# Patient Record
Sex: FEMALE | Race: WHITE | Employment: FULL TIME | URBAN - METROPOLITAN AREA
[De-identification: names, ages, dates, MRNs, and addresses within clinical notes are randomized per-mention and may not be internally consistent; named-entity substitution may affect disease eponyms.]

---

## 2017-01-20 ENCOUNTER — TELEPHONE (OUTPATIENT)
Dept: INTERNAL MEDICINE CLINIC | Age: 62
End: 2017-01-20

## 2017-01-20 DIAGNOSIS — F98.8 ADD (ATTENTION DEFICIT DISORDER): ICD-10-CM

## 2017-01-20 DIAGNOSIS — F41.9 ANXIETY AND DEPRESSION: Primary | ICD-10-CM

## 2017-01-20 DIAGNOSIS — F32.A ANXIETY AND DEPRESSION: Primary | ICD-10-CM

## 2017-01-20 DIAGNOSIS — E11.9 TYPE 2 DIABETES MELLITUS WITHOUT COMPLICATION, WITHOUT LONG-TERM CURRENT USE OF INSULIN (HCC): ICD-10-CM

## 2017-01-20 RX ORDER — ESCITALOPRAM OXALATE 20 MG/1
40 TABLET ORAL DAILY
Qty: 60 TAB | Refills: 3 | Status: SHIPPED | OUTPATIENT
Start: 2017-01-20 | End: 2017-10-16 | Stop reason: SDUPTHER

## 2017-01-20 RX ORDER — DEXTROAMPHETAMINE SACCHARATE, AMPHETAMINE ASPARTATE, DEXTROAMPHETAMINE SULFATE AND AMPHETAMINE SULFATE 2.5; 2.5; 2.5; 2.5 MG/1; MG/1; MG/1; MG/1
30 TABLET ORAL DAILY
Qty: 90 TAB | Refills: 0 | Status: SHIPPED | OUTPATIENT
Start: 2017-01-20 | End: 2017-02-03 | Stop reason: SDUPTHER

## 2017-01-20 RX ORDER — METFORMIN HYDROCHLORIDE 500 MG/1
500 TABLET, EXTENDED RELEASE ORAL
Qty: 30 TAB | Refills: 3 | Status: SHIPPED | OUTPATIENT
Start: 2017-01-20 | End: 2018-04-21

## 2017-01-20 NOTE — TELEPHONE ENCOUNTER
[address] [facility] called needing to verify the dose for the Lexapro 20 mg prescription. Please advise.    # [phone]

## 2017-01-23 ENCOUNTER — TELEPHONE (OUTPATIENT)
Dept: INTERNAL MEDICINE CLINIC | Age: 62
End: 2017-01-23

## 2017-01-24 RX ORDER — HYDROCHLOROTHIAZIDE 25 MG/1
25 TABLET ORAL DAILY
Qty: 30 TAB | Refills: 0 | Status: SHIPPED | OUTPATIENT
Start: 2017-01-24 | End: 2017-10-16 | Stop reason: SDUPTHER

## 2017-02-03 ENCOUNTER — OFFICE VISIT (OUTPATIENT)
Dept: INTERNAL MEDICINE CLINIC | Age: 62
End: 2017-02-03

## 2017-02-03 VITALS
BODY MASS INDEX: 34.89 KG/M2 | WEIGHT: 189.6 LBS | TEMPERATURE: 98.3 F | RESPIRATION RATE: 18 BRPM | HEIGHT: 62 IN | DIASTOLIC BLOOD PRESSURE: 70 MMHG | SYSTOLIC BLOOD PRESSURE: 130 MMHG | HEART RATE: 100 BPM | OXYGEN SATURATION: 97 %

## 2017-02-03 DIAGNOSIS — F41.8 DEPRESSION WITH ANXIETY: ICD-10-CM

## 2017-02-03 DIAGNOSIS — Z01.818 PRE-OPERATIVE CLEARANCE: ICD-10-CM

## 2017-02-03 DIAGNOSIS — M79.7 FIBROMYALGIA: ICD-10-CM

## 2017-02-03 DIAGNOSIS — Z72.0 TOBACCO ABUSE: ICD-10-CM

## 2017-02-03 DIAGNOSIS — E11.9 TYPE 2 DIABETES MELLITUS WITHOUT COMPLICATION, WITHOUT LONG-TERM CURRENT USE OF INSULIN (HCC): ICD-10-CM

## 2017-02-03 DIAGNOSIS — I10 ESSENTIAL HYPERTENSION: ICD-10-CM

## 2017-02-03 DIAGNOSIS — F98.8 ADD (ATTENTION DEFICIT DISORDER): ICD-10-CM

## 2017-02-03 DIAGNOSIS — M17.0 PRIMARY OSTEOARTHRITIS OF BOTH KNEES: Primary | ICD-10-CM

## 2017-02-03 RX ORDER — DEXTROAMPHETAMINE SACCHARATE, AMPHETAMINE ASPARTATE, DEXTROAMPHETAMINE SULFATE AND AMPHETAMINE SULFATE 2.5; 2.5; 2.5; 2.5 MG/1; MG/1; MG/1; MG/1
30 TABLET ORAL DAILY
Qty: 90 TAB | Refills: 0 | Status: SHIPPED | OUTPATIENT
Start: 2017-02-20 | End: 2017-07-10 | Stop reason: SDUPTHER

## 2017-02-03 RX ORDER — PREGABALIN 200 MG/1
200 CAPSULE ORAL 3 TIMES DAILY
COMMUNITY
Start: 2017-02-03 | End: 2018-04-21

## 2017-02-03 NOTE — PATIENT INSTRUCTIONS
Learning About Living [place]  What is a living will? A living will is a legal form you use to write down the kind of care you want at the end of your life. It is used by the health professionals who will treat you if you aren't able to decide for yourself. If you put your wishes in writing, your loved ones and others will know what kind of care you want. They won't need to guess. This can ease your mind and be helpful to others. A living will is not the same as an estate or property will. An estate will explains what you want to happen with your money and property after you die. Is a living will a legal document? A living will is a legal document. Each state has its own laws about living kaur. If you move to another state, make sure that your living will is legal in the state where you now live. Or you might use a universal form that has been approved by many states. This kind of form can sometimes be completed and stored online. Your electronic copy will then be available wherever you have a connection to the Internet. In most cases, doctors will respect your wishes even if you have a form from a different state. · You don't need an  to complete a living will. But legal advice can be helpful if your state's laws are unclear, your health history is complicated, or your family can't agree on what should be in your living will. · You can change your living will at any time. Some people find that their wishes about end-of-life care change as their health changes. · In addition to making a living will, think about completing a medical power of  form. This form lets you name the person you want to make end-of-life treatment decisions for you (your \"health care agent\") if you're not able to. Many hospitals and nursing homes will give you the forms you need to complete a living will and a medical power of .   · Your living will is used only if you can't make or communicate decisions for yourself anymore. If you become able to make decisions again, you can accept or refuse any treatment, no matter what you wrote in your living will. · Your state may offer an online registry. This is a place where you can store your living will online so the doctors and nurses who need to treat you can find it right away. What should you think about when creating a living will? Talk about your end-of-life wishes with your family members and your doctor. Let them know what you want. That way the people making decisions for you won't be surprised by your choices. Think about these questions as you make your living will:  · Do you know enough about life support methods that might be used? If not, talk to your doctor so you know what might be done if you can't breathe on your own, your heart stops, or you're unable to swallow. · What things would you still want to be able to do after you receive life-support methods? Would you want to be able to walk? To speak? To eat on your own? To live without the help of machines? · If you have a choice, where do you want to be cared for? In your home? At a hospital or nursing home? · Do you want certain Taoism practices performed if you become very ill? · If you have a choice at the end of your life, where would you prefer to die? At home? In a hospital or nursing home? Somewhere else? · Would you prefer to be buried or cremated? · Do you want your organs to be donated after you die? What should you do with your living will? · Make sure that your family members and your health care agent have copies of your living will. · Give your doctor a copy of your living will to keep in your medical record. If you have more than one doctor, make sure that each one has a copy. · You may want to put a copy of your living will where it can be easily found. Where can you learn more? Go to http://ac-vianey.info/.   Enter B343 in the search box to learn more about \"Learning About Living [place]. \"  Current as of: February 24, 2016  Content Version: 11.1  © 3266-3697 [employer]. Care instructions adapted under license by [employer] (which disclaims liability or warranty for this information). If you have questions about a medical condition or this instruction, always ask your healthcare professional. [address] any warranty or liability for your use of this information.

## 2017-02-03 NOTE — MR AVS SNAPSHOT
Visit Information Date & Time Provider Department Dept. Phone Encounter #  
 2/3/2017  3:00 PM [facility] [id number] [facility] [phone] [id number] Follow-up Instructions Return in about 3 months (around 5/3/2017) for diabetes, htn. Upcoming Health Maintenance Date Due Hepatitis C Screening 1955 DTaP/Tdap/Td series (1 - Tdap) 11/20/1974 ZOSTER VACCINE AGE 60> 11/20/2013 EYE EXAM RETINAL OR DILATED Q1 3/19/2016 FOBT Q 1 YEAR AGE 50-75 3/19/2016 INFLUENZA AGE 9 TO ADULT 8/1/2016 HEMOGLOBIN A1C Q6M 1/20/2017 LIPID PANEL Q1 3/1/2017 FOOT EXAM Q1 3/2/2017 Pneumococcal 19-64 Highest Risk (2 of 3 - PCV13) 2/3/2018 MICROALBUMIN Q1 2/3/2018 BREAST CANCER SCRN MAMMOGRAM 2/3/2019 PAP AKA CERVICAL CYTOLOGY 2/3/2020 Allergies as of 2/3/2017  Review Complete On: 2/3/2017 By: Sha Swanson NP Severity Noted Reaction Type Reactions Codeine  12/28/2010    Other (comments) Severe headaches Erythromycin  02/03/2017    Rash Morphine  12/28/2010    Hives Neomycin  02/03/2017    Rash Current Immunizations  Never Reviewed No immunizations on file. Not reviewed this visit You Were Diagnosed With   
  
 Codes Comments Primary osteoarthritis of both knees    -  Primary ICD-10-CM: M17.0 ICD-9-CM: 715.16 Pre-operative clearance     ICD-10-CM: I95.267 ICD-9-CM: V72.84 Essential hypertension     ICD-10-CM: I10 
ICD-9-CM: 401.9 Depression with anxiety     ICD-10-CM: F41.8 ICD-9-CM: 300.4 Fibromyalgia     ICD-10-CM: M79.7 ICD-9-CM: 729.1 Type 2 diabetes mellitus without complication, without long-term current use of insulin (HCC)     ICD-10-CM: E11.9 ICD-9-CM: 250.00 Tobacco abuse     ICD-10-CM: Z72.0 ICD-9-CM: 305.1 ADD (attention deficit disorder)     ICD-10-CM: F98.8 ICD-9-CM: 314.00 Vitals  BP Pulse Temp Resp Height(growth percentile) Weight(growth percentile) 130/70 100 98.3 °F (36.8 °C) (Oral) 18 5' 2.01\" (1.575 m) 189 lb 9.6 oz (86 kg) SpO2 BMI OB Status Smoking Status 97% 34.67 kg/m2 Postmenopausal Current Some Day Smoker Vitals History BMI and BSA Data Body Mass Index Body Surface Area  
 34.67 kg/m 2 1.94 m 2 Preferred Pharmacy Pharmacy Name Phone [facility] [address] [phone] Your Updated Medication List  
  
   
This list is accurate as of: 2/3/17  3:50 PM.  Always use your most recent med list.  
  
  
  
  
 albuterol 90 mcg/actuation inhaler Commonly known as:  PROVENTIL HFA, VENTOLIN HFA, PROAIR HFA Take 2 Puffs by inhalation every four (4) hours as needed for Wheezing. dextroamphetamine-amphetamine 10 mg tablet Commonly known as:  ADDERALL Take 3 Tabs (30 mg total) by mouth dailyEarliest Fill Date: 2/20/17. Start taking on:  2/20/2017  
  
 escitalopram oxalate 20 mg tablet Commonly known as:  Hiren Fine Take 2 Tabs by mouth daily. fluticasone-salmeterol 250-50 mcg/dose diskus inhaler Commonly known as:  ADVAIR Take 1 Puff by inhalation two (2) times a day. hydroCHLOROthiazide 25 mg tablet Commonly known as:  HYDRODIURIL Take 1 Tab by mouth daily. Appointment required  
  
 lisinopril 40 mg tablet Commonly known as:  PRINIVIL, ZESTRIL  
TAKE ONE TABLET BY MOUTH EVERY DAY  
  
 metFORMIN  mg tablet Commonly known as:  GLUCOPHAGE XR Take 1 Tab by mouth daily (with dinner). Indications: type 2 diabetes mellitus  
  
 mometasone 50 mcg/actuation nasal spray Commonly known as:  NASONEX  
USE TWO SPRAY(S) IN EACH NOSTRIL ONCE DAILY  
  
 montelukast 10 mg tablet Commonly known as:  SINGULAIR Take 1 Tab by mouth daily. pregabalin 200 mg capsule Commonly known as:  Park Marco Take 1 Cap by mouth three (3) times daily. Max Daily Amount: 600 mg. Prescriptions Printed Refills  
 dextroamphetamine-amphetamine (ADDERALL) 10 mg tablet 0 Starting on: 2/20/2017 Sig: Take 3 Tabs (30 mg total) by mouth dailyEarliest Fill Date: 2/20/17. Class: Print Route: Oral  
  
We Performed the Following MICROALBUMIN, MARCIA, SEAN [id number] CPT(R)] Follow-up Instructions Return in about 3 months (around 5/3/2017) for diabetes, htn. Patient Instructions Marcy Liu 1722 What is a living will? A living will is a legal form you use to write down the kind of care you want at the end of your life. It is used by the health professionals who will treat you if you aren't able to decide for yourself. If you put your wishes in writing, your loved ones and others will know what kind of care you want. They won't need to guess. This can ease your mind and be helpful to others. A living will is not the same as an estate or property will. An estate will explains what you want to happen with your money and property after you die. Is a living will a legal document? A living will is a legal document. Each state has its own laws about living kaur. If you move to another state, make sure that your living will is legal in the state where you now live. Or you might use a universal form that has been approved by many states. This kind of form can sometimes be completed and stored online. Your electronic copy will then be available wherever you have a connection to the Internet. In most cases, doctors will respect your wishes even if you have a form from a different state. · You don't need an  to complete a living will. But legal advice can be helpful if your state's laws are unclear, your health history is complicated, or your family can't agree on what should be in your living will. · You can change your living will at any time. Some people find that their wishes about end-of-life care change as their health changes.  
· In addition to making a living will, think about completing a medical power of  form. This form lets you name the person you want to make end-of-life treatment decisions for you (your \"health care agent\") if you're not able to. Many hospitals and nursing homes will give you the forms you need to complete a living will and a medical power of . · Your living will is used only if you can't make or communicate decisions for yourself anymore. If you become able to make decisions again, you can accept or refuse any treatment, no matter what you wrote in your living will. · Your state may offer an online registry. This is a place where you can store your living will online so the doctors and nurses who need to treat you can find it right away. What should you think about when creating a living will? Talk about your end-of-life wishes with your family members and your doctor. Let them know what you want. That way the people making decisions for you won't be surprised by your choices. Think about these questions as you make your living will: · Do you know enough about life support methods that might be used? If not, talk to your doctor so you know what might be done if you can't breathe on your own, your heart stops, or you're unable to swallow. · What things would you still want to be able to do after you receive life-support methods? Would you want to be able to walk? To speak? To eat on your own? To live without the help of machines? · If you have a choice, where do you want to be cared for? In your home? At a hospital or nursing home? · Do you want certain Presybeterian practices performed if you become very ill? · If you have a choice at the end of your life, where would you prefer to die? At home? In a hospital or nursing home? Somewhere else? · Would you prefer to be buried or cremated? · Do you want your organs to be donated after you die? What should you do with your living will?  
· Make sure that your family members and your health care agent have copies of your living will. · Give your doctor a copy of your living will to keep in your medical record. If you have more than one doctor, make sure that each one has a copy. · You may want to put a copy of your living will where it can be easily found. Where can you learn more? Go to http://ac-vianey.info/. Enter O265 in the search box to learn more about \"Learning About Living [place]. \" Current as of: February 24, 2016 Content Version: 11.1 © 1512-2035 [employer]. Care instructions adapted under license by [employer] (which disclaims liability or warranty for this information). If you have questions about a medical condition or this instruction, always ask your healthcare professional. [address] any warranty or liability for your use of this information. Introducing [facility]! Raul Neff introduces [employer] patient portal. Now you can access parts of your medical record, email your doctor's office, and request medication refills online. 1. In your internet browser, go to [email or web address] 2. Click on the First Time User? Click Here link in the Sign In box. You will see the New Member Sign Up page. 3. Enter your [employer] Access Code exactly as it appears below. You will not need to use this code after youve completed the sign-up process. If you do not sign up before the expiration date, you must request a new code. · [employer] Access Code: [email or web address] Expires: 5/4/2017  3:19 PM 
 
4. Enter the last four digits of your Social Security Number (xxxx) and Date of Birth (mm/dd/yyyy) as indicated and click Submit. You will be taken to the next sign-up page. 5. Create a [employer] ID. This will be your [employer] login ID and cannot be changed, so think of one that is secure and easy to remember. 6. Create a [employer] password. You can change your password at any time.  
7. Enter your Password Reset Question and Answer. This can be used at a later time if you forget your password. 8. Enter your e-mail address. You will receive e-mail notification when new information is available in [address] 9. Click Sign Up. You can now view and download portions of your medical record. 10. Click the Download Summary menu link to download a portable copy of your medical information. If you have questions, please visit the Frequently Asked Questions section of the WaveCheck website. Remember, [employer]Check is NOT to be used for urgent needs. For medical emergencies, dial 911. Now available from your iPhone and Android! Please provide this summary of care documentation to your next provider. Your primary care clinician is listed as Cesar Nagel. If you have any questions after today's visit, please call [phone].

## 2017-02-03 NOTE — PROGRESS NOTES
RM#7  Chief Complaint   Patient presents with    Physical     Pre-op knee surgery on 2/14/17     1. Have you been to the ER, urgent care clinic since your last visit? Hospitalized since your last visit? No    2. Have you seen or consulted any other health care providers outside of the [facility] [place] since your last visit? Include any pap smears or colon screening.  No  No living will ADV

## 2017-02-03 NOTE — PROGRESS NOTES
HISTORY OF PRESENT ILLNESS  Mar Barron is a 59 y.o. female presents for pre op clearance  HPI  She is scheduled for bilateral TKR . Surgeon Dr. Wen Calzada [facility]    Pre op labs and EKG done at hospital    She reports ' they only need a letter clearing me for surgery'    Out of work on disability, since 2016 secondary to knee pain. Pain with weight bearing. Denies specific injury      Continues to see Dr. Clayton Recinos, rheumatologist. Started on Lyrica for fibromyalgia pain, medication effective    Requests Adderall refill. Denies ADRs    Past Medical History   Diagnosis Date    ADD (attention deficit disorder)     Anxiety and depression     DDD (degenerative disc disease), lumbar      dr Elaina Lee type 2 diabetes mellitus ([facility] [address]     Epicondylitis, lateral     Fibromyalgia     Hx of diabetes mellitus     Hyperlipidemia LDL goal < 100     Hypertension     OA (osteoarthritis)     Rotator cuff rupture     Tobacco abuse      Past Surgical History   Procedure Laterality Date    Hx mohs procedure  8/15     Sera Haskins    Hx partial hysterectomy      Hx shoulder replacement Left     Hx  section        Patient denies anesthesia complications    Current Outpatient Prescriptions on File Prior to Visit   Medication Sig Dispense Refill    hydroCHLOROthiazide (HYDRODIURIL) 25 mg tablet Take 1 Tab by mouth daily. Appointment required 30 Tab 0    escitalopram oxalate (LEXAPRO) 20 mg tablet Take 2 Tabs by mouth daily. 60 Tab 3    metFORMIN ER (GLUCOPHAGE XR) 500 mg tablet Take 1 Tab by mouth daily (with dinner). Indications: type 2 diabetes mellitus 30 Tab 3    mometasone (NASONEX) 50 mcg/actuation nasal spray USE TWO SPRAY(S) IN EACH NOSTRIL ONCE DAILY 1 Container 0    albuterol (PROVENTIL HFA, VENTOLIN HFA, PROAIR HFA) 90 mcg/actuation inhaler Take 2 Puffs by inhalation every four (4) hours as needed for Wheezing.  1 Inhaler 2    fluticasone-salmeterol (ADVAIR) 250-50 mcg/dose diskus inhaler Take 1 Puff by inhalation two (2) times a day. 1 Inhaler 5    montelukast (SINGULAIR) 10 mg tablet Take 1 Tab by mouth daily. 30 Tab 5    lisinopril (PRINIVIL, ZESTRIL) 40 mg tablet TAKE ONE TABLET BY MOUTH EVERY DAY 90 Tab 3     No current facility-administered medications on file prior to visit. History reviewed. No pertinent family history. Psychosocial hx: , occasional alcohol use, smoker, + depression, anxiety and ADD    Allergies   Allergen Reactions    Codeine Other (comments)     Severe headaches    Erythromycin Rash    Morphine Hives    Neomycin Rash      Visit Vitals    /70    Pulse 100    Temp 98.3 °F (36.8 °C) (Oral)    Resp 18    Ht 5' 2.01\" (1.575 m)    Wt 189 lb 9.6 oz (86 kg)    SpO2 97%    BMI 34.67 kg/m2     Review of Systems   Constitutional: Negative for chills and fever. HENT: Negative for congestion and sore throat. Eyes: Negative. Respiratory: Negative. Cardiovascular: Negative for chest pain, palpitations, claudication and leg swelling. Gastrointestinal: Negative. Genitourinary: Negative. Musculoskeletal: Positive for joint pain and myalgias. Negative for back pain, falls and neck pain. Skin: Negative. Neurological: Negative for dizziness, sensory change, speech change, focal weakness and headaches. Psychiatric/Behavioral: Negative for depression, memory loss and substance abuse. The patient is nervous/anxious. The patient does not have insomnia. Physical Exam   Constitutional: She is oriented to person, place, and time. She appears well-developed and well-nourished. No distress. HENT:   Right Ear: External ear normal.   Left Ear: External ear normal.   Nose: Nose normal.   Mouth/Throat: Oropharynx is clear and moist. No oropharyngeal exudate. Eyes: Conjunctivae are normal. Right eye exhibits no discharge. Left eye exhibits no discharge. No scleral icterus. Neck: Normal range of motion. Neck supple. No JVD present. Carotid bruit is not present. No thyromegaly present. Cardiovascular: Normal rate, regular rhythm and normal heart sounds. Pulmonary/Chest: Effort normal and breath sounds normal. No respiratory distress. She has no wheezes. She has no rales. She exhibits no tenderness. Abdominal: Soft. Bowel sounds are normal.   Musculoskeletal: She exhibits no edema, tenderness or deformity. Lymphadenopathy:     She has no cervical adenopathy. Neurological: She is alert and oriented to person, place, and time. No cranial nerve deficit. Coordination normal.   Skin: Skin is warm and dry. No rash noted. She is not diaphoretic. No erythema. Psychiatric: She has a normal mood and affect. Her behavior is normal. Judgment and thought content normal.       ASSESSMENT and PLAN    ICD-10-CM ICD-9-CM    1. Primary osteoarthritis of both knees M17.0 715.16    2. Pre-operative clearance Z01.818 V72.84    3. Essential hypertension I10 401.9    4. Depression with anxiety F41.8 300.4    5. Fibromyalgia M79.7 729.1 pregabalin (LYRICA) 200 mg capsule   6. Type 2 diabetes mellitus without complication, without long-term current use of insulin (HCC) E11.9 250.00 MICROALBUMIN, UR, RAND   7. Tobacco abuse Z72.0 305.1    8. ADD (attention deficit disorder) F98.8 314.00 dextroamphetamine-amphetamine (ADDERALL) 10 mg tablet     Follow-up Disposition:  Return in about 3 months (around 5/3/2017) for diabetes, htn.  current treatment plan is effective, no change in therapy  reviewed diet, exercise and weight control  reviewed medications and side effects in detail    1. No contraindications for planned surgical procedure based on today's H&P    2. Pre op lab and EKG not reviewed    Health Maintenance:  Patent continues to decline vaccines, referral for mammogram and colonoscopy    Addendum: pre op lab results reviewed.  No significant abnormal findings  No EKG received, will defer EKG interpretation to surgery center

## 2017-02-04 LAB — MICROALBUMIN UR-MCNC: <3 UG/ML

## 2017-02-07 ENCOUNTER — PATIENT OUTREACH (OUTPATIENT)
Dept: INTERNAL MEDICINE CLINIC | Age: 62
End: 2017-02-07

## 2017-02-07 NOTE — PROGRESS NOTES
Incoming call received from [employer]. Patient verified using name and . Informed patient has not had lab work and EKG done. Patient will come in later this week to complete. [employer] will fax results and EKG when completed to office number given. Jose Alfredo Carvalho for assistance.

## 2017-02-07 NOTE — PROGRESS NOTES
Outgoing call made to [facility] Orth[facility]-Dr. Lim's office. Left a message for triage nurse- Felix Roach  to fax pre-op lab work and EKG for pre-op clearance for patient's surgery on 2/14/17. Left fax number and contact information for panel manager. Will follow up this afternoon if not received.

## 2017-02-08 ENCOUNTER — OP HISTORICAL/CONVERTED ENCOUNTER (OUTPATIENT)
Dept: OTHER | Age: 62
End: 2017-02-08

## 2017-02-09 ENCOUNTER — TELEPHONE (OUTPATIENT)
Dept: INTERNAL MEDICINE CLINIC | Age: 62
End: 2017-02-09

## 2017-02-14 ENCOUNTER — IP HISTORICAL/CONVERTED ENCOUNTER (OUTPATIENT)
Dept: OTHER | Age: 62
End: 2017-02-14

## 2017-04-08 DIAGNOSIS — J40 BRONCHITIS: ICD-10-CM

## 2017-04-10 RX ORDER — ALBUTEROL SULFATE 90 UG/1
AEROSOL, METERED RESPIRATORY (INHALATION)
Qty: 1 INHALER | Refills: 1 | Status: SHIPPED | OUTPATIENT
Start: 2017-04-10 | End: 2017-10-16 | Stop reason: SDUPTHER

## 2017-07-10 ENCOUNTER — OFFICE VISIT (OUTPATIENT)
Dept: INTERNAL MEDICINE CLINIC | Age: 62
End: 2017-07-10

## 2017-07-10 VITALS
WEIGHT: 170.8 LBS | BODY MASS INDEX: 31.43 KG/M2 | SYSTOLIC BLOOD PRESSURE: 150 MMHG | TEMPERATURE: 98.9 F | OXYGEN SATURATION: 95 % | HEART RATE: 107 BPM | RESPIRATION RATE: 18 BRPM | DIASTOLIC BLOOD PRESSURE: 90 MMHG | HEIGHT: 62 IN

## 2017-07-10 DIAGNOSIS — J45.41 ASTHMATIC BRONCHITIS, MODERATE PERSISTENT, WITH ACUTE EXACERBATION: Primary | ICD-10-CM

## 2017-07-10 DIAGNOSIS — E11.9 TYPE 2 DIABETES MELLITUS WITHOUT COMPLICATION, WITHOUT LONG-TERM CURRENT USE OF INSULIN (HCC): ICD-10-CM

## 2017-07-10 DIAGNOSIS — F98.8 ADD (ATTENTION DEFICIT DISORDER): ICD-10-CM

## 2017-07-10 DIAGNOSIS — I10 ESSENTIAL HYPERTENSION: ICD-10-CM

## 2017-07-10 DIAGNOSIS — R63.4 WEIGHT LOSS: ICD-10-CM

## 2017-07-10 DIAGNOSIS — J02.9 SORE THROAT: ICD-10-CM

## 2017-07-10 LAB
BILIRUB UR QL STRIP: NEGATIVE
GLUCOSE UR-MCNC: NEGATIVE MG/DL
HBA1C MFR BLD HPLC: 6.8 %
KETONES P FAST UR STRIP-MCNC: NEGATIVE MG/DL
PH UR STRIP: 5.5 [PH] (ref 4.6–8)
PROT UR QL STRIP: NEGATIVE MG/DL
SP GR UR STRIP: 1.02 (ref 1–1.03)
UA UROBILINOGEN AMB POC: NORMAL (ref 0.2–1)
URINALYSIS CLARITY POC: CLEAR
URINALYSIS COLOR POC: YELLOW
URINE BLOOD POC: NEGATIVE
URINE LEUKOCYTES POC: NEGATIVE
URINE NITRITES POC: NEGATIVE

## 2017-07-10 RX ORDER — AMOXICILLIN AND CLAVULANATE POTASSIUM 875; 125 MG/1; MG/1
1 TABLET, FILM COATED ORAL 2 TIMES DAILY
Qty: 20 TAB | Refills: 0 | Status: SHIPPED | OUTPATIENT
Start: 2017-07-10 | End: 2017-07-20

## 2017-07-10 RX ORDER — ALBUTEROL SULFATE 0.83 MG/ML
2.5 SOLUTION RESPIRATORY (INHALATION)
Qty: 24 EACH | Refills: 0 | Status: SHIPPED | OUTPATIENT
Start: 2017-07-10 | End: 2017-10-16 | Stop reason: SDUPTHER

## 2017-07-10 RX ORDER — DEXTROAMPHETAMINE SACCHARATE, AMPHETAMINE ASPARTATE, DEXTROAMPHETAMINE SULFATE AND AMPHETAMINE SULFATE 2.5; 2.5; 2.5; 2.5 MG/1; MG/1; MG/1; MG/1
30 TABLET ORAL DAILY
Qty: 90 TAB | Refills: 0 | Status: SHIPPED | OUTPATIENT
Start: 2017-07-10 | End: 2017-08-31 | Stop reason: SDUPTHER

## 2017-07-10 RX ORDER — LISINOPRIL 40 MG/1
TABLET ORAL
Qty: 90 TAB | Refills: 3 | Status: SHIPPED | OUTPATIENT
Start: 2017-07-10 | End: 2018-04-06 | Stop reason: DRUGHIGH

## 2017-07-10 RX ORDER — GABAPENTIN 400 MG/1
400 CAPSULE ORAL 2 TIMES DAILY
COMMUNITY
End: 2018-04-21

## 2017-07-10 RX ORDER — PREDNISONE 20 MG/1
TABLET ORAL
Qty: 20 TAB | Refills: 0 | Status: SHIPPED | OUTPATIENT
Start: 2017-07-10 | End: 2018-02-18 | Stop reason: ALTCHOICE

## 2017-07-10 NOTE — MR AVS SNAPSHOT
Visit Information Date & Time Provider Department Dept. Phone Encounter #  
 7/10/2017  3:45 PM [facility] [phone] [id number] Follow-up Instructions Return in about 4 weeks (around 8/7/2017) for htn, hyperlipidemia, fasting labs, asthma. Upcoming Health Maintenance Date Due Hepatitis C Screening 1955 DTaP/Tdap/Td series (1 - Tdap) 11/20/1974 ZOSTER VACCINE AGE 60> 11/20/2013 EYE EXAM RETINAL OR DILATED Q1 3/19/2016 FOBT Q 1 YEAR AGE 50-75 3/19/2016 HEMOGLOBIN A1C Q6M 1/20/2017 LIPID PANEL Q1 3/1/2017 FOOT EXAM Q1 3/2/2017 INFLUENZA AGE 9 TO ADULT 8/1/2017 Pneumococcal 19-64 Highest Risk (2 of 3 - PCV13) 2/3/2018 MICROALBUMIN Q1 2/8/2018 BREAST CANCER SCRN MAMMOGRAM 2/3/2019 PAP AKA CERVICAL CYTOLOGY 2/3/2020 Allergies as of 7/10/2017  Review Complete On: 7/10/2017 By: Lorrie Monahan NP Severity Noted Reaction Type Reactions Codeine  12/28/2010    Other (comments) Severe headaches Erythromycin  02/03/2017    Rash Morphine  12/28/2010    Hives Neomycin  02/03/2017    Rash Current Immunizations  Never Reviewed No immunizations on file. Not reviewed this visit You Were Diagnosed With   
  
 Codes Comments Asthmatic bronchitis, moderate persistent, with acute exacerbation    -  Primary ICD-10-CM: J45.41 
ICD-9-CM: 493.92 Sore throat     ICD-10-CM: J02.9 ICD-9-CM: 465 Type 2 diabetes mellitus without complication, without long-term current use of insulin (HCC)     ICD-10-CM: E11.9 ICD-9-CM: 250.00 Weight loss     ICD-10-CM: R63.4 ICD-9-CM: 783.21   
 ADD (attention deficit disorder)     ICD-10-CM: F98.8 ICD-9-CM: 314.00 Essential hypertension     ICD-10-CM: I10 
ICD-9-CM: 401.9 Vitals BP Pulse Temp Resp Height(growth percentile) Weight(growth percentile)  150/90 (!) 107 98.9 °F (37.2 °C) (Oral) 18 5' 2.01\" (1.575 m) 170 lb 12.8 oz (77.5 kg) SpO2 BMI OB Status Smoking Status 95% 31.23 kg/m2 Postmenopausal Former Smoker Vitals History BMI and BSA Data Body Mass Index Body Surface Area  
 31.23 kg/m 2 1.84 m 2 Preferred Pharmacy Pharmacy Name Phone [address], [address] [facility] [phone] Your Updated Medication List  
  
   
This list is accurate as of: 7/10/17  5:06 PM.  Always use your most recent med list.  
  
  
  
  
 amoxicillin-clavulanate 875-125 mg per tablet Commonly known as:  AUGMENTIN Take 1 Tab by mouth two (2) times a day for 10 days. dextroamphetamine-amphetamine 10 mg tablet Commonly known as:  ADDERALL Take 3 Tabs (30 mg total) by mouth daily. escitalopram oxalate 20 mg tablet Commonly known as:  Clari Polio Take 2 Tabs by mouth daily. fluticasone-salmeterol 250-50 mcg/dose diskus inhaler Commonly known as:  ADVAIR Take 1 Puff by inhalation two (2) times a day.  
  
 gabapentin 400 mg capsule Commonly known as:  NEURONTIN Take 400 mg by mouth two (2) times a day. hydroCHLOROthiazide 25 mg tablet Commonly known as:  HYDRODIURIL Take 1 Tab by mouth daily. Appointment required  
  
 lisinopril 40 mg tablet Commonly known as:  PRINIVIL, ZESTRIL  
TAKE ONE TABLET BY MOUTH EVERY DAY  
  
 metFORMIN  mg tablet Commonly known as:  GLUCOPHAGE XR Take 1 Tab by mouth daily (with dinner). Indications: type 2 diabetes mellitus  
  
 mometasone 50 mcg/actuation nasal spray Commonly known as:  NASONEX  
USE TWO SPRAY(S) IN EACH NOSTRIL ONCE DAILY  
  
 montelukast 10 mg tablet Commonly known as:  SINGULAIR Take 1 Tab by mouth daily. predniSONE 20 mg tablet Commonly known as:  [facility] Three tablets daily for 3 days, 2 tablets daily for 3 days, 1 tablet daily for 2 days, 1/2 tablets daily 2  
  
 pregabalin 200 mg capsule Commonly known as:  Maurilio Wooton Take 1 Cap by mouth three (3) times daily. Max Daily Amount: 600 mg.  
  
 * VENTOLIN HFA 90 mcg/actuation inhaler Generic drug:  albuterol INHALE TWO PUFFS BY MOUTH EVERY 4 HOURS AS NEEDED FOR  WHEEZING  
  
 * albuterol 2.5 mg /3 mL (0.083 %) nebulizer solution Commonly known as:  PROVENTIL VENTOLIN  
3 mL by Nebulization route every four (4) hours as needed for Wheezing. Indications: BRONCHOSPASTIC PULMONARY DISEASE * Notice: This list has 2 medication(s) that are the same as other medications prescribed for you. Read the directions carefully, and ask your doctor or other care provider to review them with you. Prescriptions Printed Refills  
 dextroamphetamine-amphetamine (ADDERALL) 10 mg tablet 0 Sig: Take 3 Tabs (30 mg total) by mouth daily. Class: Print Route: Oral  
 lisinopril (PRINIVIL, ZESTRIL) 40 mg tablet 3 Sig: TAKE ONE TABLET BY MOUTH EVERY DAY Class: Print Prescriptions Sent to Pharmacy Refills  
 amoxicillin-clavulanate (AUGMENTIN) 875-125 mg per tablet 0 Sig: Take 1 Tab by mouth two (2) times a day for 10 days. Class: Normal  
 Pharmacy: [address] Ph #: [phone] Route: Oral  
 predniSONE (DELTASONE) 20 mg tablet 0 Sig: Three tablets daily for 3 days, 2 tablets daily for 3 days, 1 tablet daily for 2 days, 1/2 tablets daily 2 Class: Normal  
 Pharmacy: [address] Ph #: [phone]  
 albuterol (PROVENTIL VENTOLIN) 2.5 mg /3 mL (0.083 %) nebulizer solution 0 Sig: 3 mL by Nebulization route every four (4) hours as needed for Wheezing. Indications: BRONCHOSPASTIC PULMONARY DISEASE Class: Normal  
 Pharmacy: [place] [address] [address] Ph #: [phone] Route: Nebulization We Performed the Following AMB POC HEMOGLOBIN A1C [88139 CPT(R)] AMB POC URINALYSIS DIP STICK AUTO W/O MICRO [59445 CPT(R)] Follow-up Instructions Return in about 4 weeks (around 8/7/2017) for htn, hyperlipidemia, fasting labs, asthma. Patient Instructions Asthma in Adults: Care Instructions Your Care Instructions During an asthma attack, your airways swell and narrow as a reaction to certain things (triggers). This makes it hard to breathe. You may be able to prevent asthma attacks if you avoid the things that set off your asthma symptoms. Keeping your asthma under control and treating symptoms before they get bad can help you avoid severe attacks. If you can control your asthma, you may be able to do all of your normal daily activities. You may also avoid asthma attacks and trips to the hospital. 
Follow-up care is a key part of your treatment and safety. Be sure to make and go to all appointments, and call your doctor if you are having problems. It's also a good idea to know your test results and keep a list of the medicines you take. How can you care for yourself at home? · Follow your asthma action plan so you can manage your symptoms at home. An asthma action plan will help you prevent and control airway reactions and will tell you what to do during an asthma attack. If you do not have an asthma action plan, work with your doctor to build one. · Take your asthma medicine exactly as prescribed. Medicine plays an important role in controlling asthma. Talk to your doctor right away if you have any questions about what to take and how to take it. ¨ Use your quick-relief medicine when you have symptoms of an attack. Quick-relief medicine often is an albuterol inhaler. Some people need to use quick-relief medicine before they exercise. ¨ Take your controller medicine every day, not just when you have symptoms. Controller medicine is usually an inhaled corticosteroid. The goal is to prevent problems before they occur. Do not use your controller medicine to try to treat an attack that has already started.  It does not work fast enough to help. ¨ If your doctor prescribed corticosteroid pills to use during an attack, take them as directed. They may take hours to work, but they may shorten the attack and help you breathe better. ¨ Keep your quick-relief medicine with you at all times. · Talk to your doctor before using other medicines. Some medicines, such as aspirin, can cause asthma attacks in some people. · Check yourself for asthma symptoms to know which step to follow in your action plan. Watch for things like being short of breath, having chest tightness, coughing, and wheezing. Also notice if symptoms wake you up at night or if you get tired quickly when you exercise. · If you have a peak flow meter, use it to check how well you are breathing. This can help you predict when an asthma attack is going to occur. Then you can take medicine to prevent the asthma attack or make it less severe. · See your doctor regularly. These visits will help you learn more about asthma and what you can do to control it. Your doctor will monitor your treatment to make sure the medicine is helping you. · Keep track of your asthma attacks and your treatment. After you have had an attack, write down what triggered it, what helped end it, and any concerns you have about your asthma action plan. Take your diary when you see your doctor. You can then review your asthma action plan and decide if it is working. · Do not smoke or allow others to smoke around you. Avoid smoky places. Smoking makes asthma worse. If you need help quitting, talk to your doctor about stop-smoking programs and medicines. These can increase your chances of quitting for good. · Learn what triggers an asthma attack for you, and avoid the triggers when you can. Common triggers include colds, smoke, air pollution, dust, pollen, mold, pets, cockroaches, stress, and cold air. · Avoid colds and the flu. Get a pneumococcal vaccine shot.  If you have had one before, ask your doctor whether you need a second dose. Get a flu vaccine every fall. If you must be around people with colds or the flu, wash your hands often. When should you call for help? Call 911 anytime you think you may need emergency care. For example, call if: 
· You have severe trouble breathing. Call your doctor now or seek immediate medical care if: 
· Your symptoms do not get better after you have followed your asthma action plan. · You cough up yellow, dark brown, or bloody mucus (sputum). Watch closely for changes in your health, and be sure to contact your doctor if: 
· Your coughing and wheezing get worse. · You need to use quick-relief medicine on more than 2 days a week (unless it is just for exercise). · You need help figuring out what is triggering your asthma attacks. Where can you learn more? Go to http://ac-vianey.info/. Enter P597 in the search box to learn more about \"Asthma in Adults: Care Instructions. \" Current as of: March 25, 2017 Content Version: 11.3 © 6235-3784 [employer]. Care instructions adapted under license by [employer] (which disclaims liability or warranty for this information). If you have questions about a medical condition or this instruction, always ask your healthcare professional. [address] any warranty or liability for your use of this information. Introducing [address]! Norm Grimm introduces [employer] patient portal. Now you can access parts of your medical record, email your doctor's office, and request medication refills online. 1. In your internet browser, go to [email or web address] 2. Click on the First Time User? Click Here link in the Sign In box. You will see the New Member Sign Up page. 3. Enter your [employer] Access Code exactly as it appears below. You will not need to use this code after youve completed the sign-up process.  If you do not sign up before the expiration date, you must request a new code. · [employer] Access Code: [email or web address] Expires: 9/25/2017 11:12 AM 
 
4. Enter the last four digits of your Social Security Number (xxxx) and Date of Birth (mm/dd/yyyy) as indicated and click Submit. You will be taken to the next sign-up page. 5. Create a [employer] ID. This will be your [employer] login ID and cannot be changed, so think of one that is secure and easy to remember. 6. Create a [employer] password. You can change your password at any time. 7. Enter your Password Reset Question and Answer. This can be used at a later time if you forget your password. 8. Enter your e-mail address. You will receive e-mail notification when new information is available in [address] 9. Click Sign Up. You can now view and download portions of your medical record. 10. Click the Download Summary menu link to download a portable copy of your medical information. If you have questions, please visit the Frequently Asked Questions section of the [employer] website. Remember, [employer] is NOT to be used for urgent needs. For medical emergencies, dial 911. Now available from your iPhone and Android! Please provide this summary of care documentation to your next provider. Your primary care clinician is listed as Jamila Net. If you have any questions after today's visit, please call [phone].

## 2017-07-10 NOTE — PROGRESS NOTES
HISTORY OF PRESENT ILLNESS  Zahira Love is a 59 y.o. female for acute visit  HPI  Headache, cough, ears feel full, sore throat since Saturday. Dizzy when she turns head    Same symptoms in July for last 2 years    Requests medications refill    S/p bilateral TKR. Denies complications. pleased with outcome        Past Medical History:   Diagnosis Date    ADD (attention deficit disorder)     Anxiety and depression     DDD (degenerative disc disease), lumbar     dr K[facility] Gant [facility]     Epicondylitis, lateral     Fibromyalgia     Hx of diabetes mellitus     Hyperlipidemia LDL goal < 100     Hypertension     OA (osteoarthritis)     Rotator cuff rupture     Tobacco abuse      Current Outpatient Prescriptions on File Prior to Visit   Medication Sig Dispense Refill    VENTOLIN HFA 90 mcg/actuation inhaler INHALE TWO PUFFS BY MOUTH EVERY 4 HOURS AS NEEDED FOR  WHEEZING 1 Inhaler 1    hydroCHLOROthiazide (HYDRODIURIL) 25 mg tablet Take 1 Tab by mouth daily. Appointment required 30 Tab 0    escitalopram oxalate (LEXAPRO) 20 mg tablet Take 2 Tabs by mouth daily. 60 Tab 3    metFORMIN ER (GLUCOPHAGE XR) 500 mg tablet Take 1 Tab by mouth daily (with dinner). Indications: type 2 diabetes mellitus 30 Tab 3    mometasone (NASONEX) 50 mcg/actuation nasal spray USE TWO SPRAY(S) IN EACH NOSTRIL ONCE DAILY 1 Container 0    fluticasone-salmeterol (ADVAIR) 250-50 mcg/dose diskus inhaler Take 1 Puff by inhalation two (2) times a day. 1 Inhaler 5    montelukast (SINGULAIR) 10 mg tablet Take 1 Tab by mouth daily. 30 Tab 5    pregabalin (LYRICA) 200 mg capsule Take 1 Cap by mouth three (3) times daily. Max Daily Amount: 600 mg. No current facility-administered medications on file prior to visit. Review of Systems   Constitutional: Positive for weight loss (intentiional). Eyes: Negative for blurred vision. Respiratory: Positive for cough and sputum production. Cardiovascular: Positive for chest pain (pleuritic). Gastrointestinal: Negative. Genitourinary: Negative. Neurological: Negative. Physical Exam   Constitutional: She is oriented to person, place, and time. She appears well-developed and well-nourished. No distress. 19 lb weight loss since 2/17   Neck: No JVD present. Carotid bruit is not present. Cardiovascular: Normal rate, regular rhythm and normal heart sounds. Pulmonary/Chest: Effort normal and breath sounds normal.   Musculoskeletal: She exhibits no edema, tenderness or deformity. Lymphadenopathy:     She has no cervical adenopathy. Neurological: She is alert and oriented to person, place, and time. Skin: Skin is warm and dry. She is not diaphoretic. Psychiatric: Thought content normal. Her mood appears anxious. Her speech is rapid and/or pressured and tangential. She is hyperactive. Cognition and memory are normal.       ASSESSMENT and PLAN    ICD-10-CM ICD-9-CM    1. Asthmatic bronchitis, moderate persistent, with acute exacerbation J45.41 493.92 amoxicillin-clavulanate (AUGMENTIN) 875-125 mg per tablet      predniSONE (DELTASONE) 20 mg tablet      albuterol (PROVENTIL VENTOLIN) 2.5 mg /3 mL (0.083 %) nebulizer solution   2. Essential hypertension I10 401.9 lisinopril (PRINIVIL, ZESTRIL) 40 mg tablet   3. Type 2 diabetes mellitus without complication, without long-term current use of insulin (HCC) E11.9 250.00 AMB POC HEMOGLOBIN A1C      AMB POC URINALYSIS DIP STICK AUTO W/O MICRO   4. ADD (attention deficit disorder) F98.8 314.00 dextroamphetamine-amphetamine (ADDERALL) 10 mg tablet   5. Sore throat J02.9 462 CANCELED: AMB POC RAPID STREP A   6. Weight loss R63.4 783.21 AMB POC HEMOGLOBIN A1C     Follow-up Disposition:  Return in about 4 weeks (around 8/7/2017) for htn, hyperlipidemia, fasting labs, asthma.   reviewed medications and side effects in detail

## 2017-07-10 NOTE — PATIENT INSTRUCTIONS

## 2017-07-10 NOTE — LETTER
NOTIFICATION RETURN TO WORK / SCHOOL 
 
7/10/2017 4:52 PM 
 
Ms. Hansel Chase 3 [facility] [phone] To Whom It May Concern: 
 
Hansel Chase is currently under the care of [facility]. She will return to work/school on: July 12, 2017 If there are questions or concerns please have the patient contact our office. Sincerely, Harlan Manriquez NP

## 2017-07-10 NOTE — PROGRESS NOTES
Analilia Rosa is a 59 y.o. female  Chief Complaint   Patient presents with    Cold Symptoms     sore throat, head congestion, chest congestion, ear fullness x 3 days     1. Have you been to the ER, urgent care clinic since your last visit? Hospitalized since your last visit? Double knee replacement on 2/14/17 at [facility], Dr. Fern Recinos, [facility]. 2. Have you seen or consulted any other health care providers outside of the [address] [place] [address] since your last visit? Include any pap smears or colon screening. See above.

## 2017-07-13 ENCOUNTER — TELEPHONE (OUTPATIENT)
Dept: INTERNAL MEDICINE CLINIC | Age: 62
End: 2017-07-13

## 2017-07-13 NOTE — TELEPHONE ENCOUNTER
Received phone call from patient at work,  Stated she had trouble breathing due to be so hot in facility,  Patient stated they will not let her go back to work till her Dr tells the Nurse at the facility that she can,  Prema Lara not available   Patient saw Prema Lara 7/10 and is on prednisone & ABT for Asthmatic Bronchitis. Asked for a number so NP Prema Lara could return call and talk to the Nurse when she came out of the room,  Patient stated they didn't have an incoming number. Patient wanted be to tell Nurse that she is feeling better, stated I could not make that evaluation and recommended she be seen at the closest [facility] and have physician determine if she can go back to work and get it in writing.     Patient stated she agreed

## 2017-07-19 ENCOUNTER — DOCUMENTATION ONLY (OUTPATIENT)
Dept: INTERNAL MEDICINE CLINIC | Age: 62
End: 2017-07-19

## 2017-10-13 ENCOUNTER — TELEPHONE (OUTPATIENT)
Dept: INTERNAL MEDICINE CLINIC | Age: 62
End: 2017-10-13

## 2017-10-13 DIAGNOSIS — F32.A ANXIETY AND DEPRESSION: ICD-10-CM

## 2017-10-13 DIAGNOSIS — J06.9 ACUTE URI: ICD-10-CM

## 2017-10-13 DIAGNOSIS — F41.9 ANXIETY AND DEPRESSION: ICD-10-CM

## 2017-10-13 NOTE — TELEPHONE ENCOUNTER
Patient came into the office requesting for the following medications to be printed out for her to  early next week. [facility] for a 90 day supply. Adderall, Lisinopril, Albuterol, Lexapro, Gabapentin, Nasonex, Advair, Singulair, Hydrochlorothiazide    Patient states that she spoke with [facility] this morning and was told she needed to have the prescriptions she currently takes in hard copy form to send off to them in the mail and get filled for a 90 day supply.  If any questions, please contact patient at [phone]

## 2017-10-16 RX ORDER — MOMETASONE FUROATE 50 UG/1
2 SPRAY, METERED NASAL DAILY
Qty: 3 CONTAINER | Refills: 0 | Status: SHIPPED | OUTPATIENT
Start: 2017-10-16 | End: 2018-04-21

## 2017-10-16 RX ORDER — ALBUTEROL SULFATE 90 UG/1
2 AEROSOL, METERED RESPIRATORY (INHALATION)
Qty: 3 INHALER | Refills: 0 | Status: SHIPPED | OUTPATIENT
Start: 2017-10-16 | End: 2018-04-06 | Stop reason: SDUPTHER

## 2017-10-16 RX ORDER — MONTELUKAST SODIUM 10 MG/1
10 TABLET ORAL DAILY
Qty: 90 TAB | Refills: 0 | Status: SHIPPED | OUTPATIENT
Start: 2017-10-16 | End: 2018-09-06 | Stop reason: SDUPTHER

## 2017-10-16 RX ORDER — ESCITALOPRAM OXALATE 20 MG/1
20 TABLET ORAL DAILY
Qty: 90 TAB | Refills: 0 | Status: SHIPPED | OUTPATIENT
Start: 2017-10-16 | End: 2018-09-06 | Stop reason: SDUPTHER

## 2017-10-16 RX ORDER — HYDROCHLOROTHIAZIDE 25 MG/1
25 TABLET ORAL DAILY
Qty: 90 TAB | Refills: 0 | Status: SHIPPED | OUTPATIENT
Start: 2017-10-16 | End: 2018-04-06 | Stop reason: DRUGHIGH

## 2017-10-16 RX ORDER — FLUTICASONE PROPIONATE AND SALMETEROL 250; 50 UG/1; UG/1
1 POWDER RESPIRATORY (INHALATION) 2 TIMES DAILY
Qty: 3 INHALER | Refills: 0 | Status: SHIPPED | OUTPATIENT
Start: 2017-10-16 | End: 2018-04-06 | Stop reason: SDUPTHER

## 2017-10-16 RX ORDER — ALBUTEROL SULFATE 0.83 MG/ML
2.5 SOLUTION RESPIRATORY (INHALATION) ONCE
Qty: 72 EACH | Refills: 0 | Status: SHIPPED | OUTPATIENT
Start: 2017-10-16 | End: 2017-10-16

## 2017-10-16 NOTE — TELEPHONE ENCOUNTER
Spoke with patient after verifying name and  regarding Yvonne Hernandez's recommendations. Writer informed patient of Connie Hernandez's recommendations. Patient given an opportunity to ask questions, repeated information, and verbalized understanding.

## 2017-10-16 NOTE — TELEPHONE ENCOUNTER
Gabapentin is prescribed by her rheumatologist    Adderall is not written for 90 days on 30 days. She might need to get this form her local pharmacy. Scripts written    She needs to be seen for routine visit and fasting lab ASAP.  LOV July 2017

## 2017-10-18 ENCOUNTER — DOCUMENTATION ONLY (OUTPATIENT)
Dept: INTERNAL MEDICINE CLINIC | Age: 62
End: 2017-10-18

## 2017-12-01 DIAGNOSIS — F32.A ANXIETY AND DEPRESSION: ICD-10-CM

## 2017-12-01 DIAGNOSIS — F41.9 ANXIETY AND DEPRESSION: ICD-10-CM

## 2017-12-01 RX ORDER — ESCITALOPRAM OXALATE 20 MG/1
TABLET ORAL
Qty: 60 TAB | Refills: 3 | Status: SHIPPED | OUTPATIENT
Start: 2017-12-01 | End: 2018-04-21

## 2018-02-18 DIAGNOSIS — J40 BRONCHITIS: Primary | ICD-10-CM

## 2018-02-18 RX ORDER — AMOXICILLIN AND CLAVULANATE POTASSIUM 875; 125 MG/1; MG/1
1 TABLET, FILM COATED ORAL 2 TIMES DAILY
Qty: 20 TAB | Refills: 0 | Status: SHIPPED | OUTPATIENT
Start: 2018-02-18 | End: 2018-02-28

## 2018-02-18 RX ORDER — PREDNISONE 20 MG/1
40 TABLET ORAL DAILY
Qty: 10 TAB | Refills: 0 | Status: SHIPPED | OUTPATIENT
Start: 2018-02-18 | End: 2018-02-23

## 2018-04-06 ENCOUNTER — OFFICE VISIT (OUTPATIENT)
Dept: INTERNAL MEDICINE CLINIC | Age: 63
End: 2018-04-06

## 2018-04-06 VITALS
SYSTOLIC BLOOD PRESSURE: 160 MMHG | OXYGEN SATURATION: 96 % | TEMPERATURE: 99.1 F | RESPIRATION RATE: 18 BRPM | HEIGHT: 62 IN | DIASTOLIC BLOOD PRESSURE: 73 MMHG | HEART RATE: 100 BPM | BODY MASS INDEX: 31.83 KG/M2 | WEIGHT: 173 LBS

## 2018-04-06 DIAGNOSIS — I10 HTN (HYPERTENSION), BENIGN: Primary | ICD-10-CM

## 2018-04-06 DIAGNOSIS — J45.40 MODERATE PERSISTENT ASTHMA WITHOUT COMPLICATION: ICD-10-CM

## 2018-04-06 DIAGNOSIS — E11.9 DIET-CONTROLLED TYPE 2 DIABETES MELLITUS (HCC): ICD-10-CM

## 2018-04-06 DIAGNOSIS — F41.8 DEPRESSION WITH ANXIETY: ICD-10-CM

## 2018-04-06 DIAGNOSIS — F98.8 ATTENTION DEFICIT DISORDER (ADD) WITHOUT HYPERACTIVITY: ICD-10-CM

## 2018-04-06 RX ORDER — ALBUTEROL SULFATE 90 UG/1
2 AEROSOL, METERED RESPIRATORY (INHALATION)
Qty: 3 INHALER | Refills: 0 | Status: SHIPPED | OUTPATIENT
Start: 2018-04-06 | End: 2018-09-06 | Stop reason: CLARIF

## 2018-04-06 RX ORDER — LISINOPRIL AND HYDROCHLOROTHIAZIDE 20; 25 MG/1; MG/1
1 TABLET ORAL DAILY
Qty: 30 TAB | Refills: 3 | Status: SHIPPED | OUTPATIENT
Start: 2018-04-06 | End: 2019-01-28 | Stop reason: ALTCHOICE

## 2018-04-06 RX ORDER — FLUTICASONE PROPIONATE AND SALMETEROL 250; 50 UG/1; UG/1
1 POWDER RESPIRATORY (INHALATION) 2 TIMES DAILY
Qty: 3 INHALER | Refills: 0 | Status: SHIPPED | OUTPATIENT
Start: 2018-04-06 | End: 2018-09-06 | Stop reason: CLARIF

## 2018-04-06 RX ORDER — DEXTROAMPHETAMINE SACCHARATE, AMPHETAMINE ASPARTATE, DEXTROAMPHETAMINE SULFATE AND AMPHETAMINE SULFATE 2.5; 2.5; 2.5; 2.5 MG/1; MG/1; MG/1; MG/1
30 TABLET ORAL DAILY
Qty: 90 TAB | Refills: 0 | Status: SHIPPED | OUTPATIENT
Start: 2018-04-06 | End: 2018-05-17 | Stop reason: SDUPTHER

## 2018-04-06 NOTE — PATIENT INSTRUCTIONS
Learning About High Blood Pressure  What is high blood pressure? Blood pressure is a measure of how hard the blood pushes against the walls of your arteries. It's normal for blood pressure to go up and down throughout the day, but if it stays up, you have high blood pressure. Another name for high blood pressure is hypertension. Two numbers tell you your blood pressure. The first number is the systolic pressure. It shows how hard the blood pushes when your heart is pumping. The second number is the diastolic pressure. It shows how hard the blood pushes between heartbeats, when your heart is relaxed and filling with blood. A blood pressure of less than 120/80 (say \"120 over 80\") is ideal for an adult. High blood pressure is 140/90 or higher. You have high blood pressure if your top number is 140 or higher or your bottom number is 90 or higher, or both. Many people fall into the category in between, called prehypertension. People with prehypertension need to make lifestyle changes to bring their blood pressure down and help prevent or delay high blood pressure. What happens when you have high blood pressure? · Blood flows through your arteries with too much force. Over time, this damages the walls of your arteries. But you can't feel it. High blood pressure usually doesn't cause symptoms. · Fat and calcium start to build up in your arteries. This buildup is called plaque. Plaque makes your arteries narrower and stiffer. Blood can't flow through them as easily. · This lack of good blood flow starts to damage some of the organs in your body. This can lead to problems such as coronary artery disease and heart attack, heart failure, stroke, kidney failure, and eye damage. How can you prevent high blood pressure? · Stay at a healthy weight. · Try to limit how much sodium you eat to less than 2,300 milligrams (mg) a day.  If you limit your sodium to 1,500 mg a day, you can lower your blood pressure even more.  ¨ Buy foods that are labeled \"unsalted,\" \"sodium-free,\" or \"low-sodium. \" Foods labeled \"reduced-sodium\" and \"light sodium\" may still have too much sodium. ¨ Flavor your food with garlic, lemon juice, onion, vinegar, herbs, and spices instead of salt. Do not use soy sauce, steak sauce, onion salt, garlic salt, mustard, or ketchup on your food. ¨ Use less salt (or none) when recipes call for it. You can often use half the salt a recipe calls for without losing flavor. · Be physically active. Get at least 30 minutes of exercise on most days of the week. Walking is a good choice. You also may want to do other activities, such as running, swimming, cycling, or playing tennis or team sports. · Limit alcohol to 2 drinks a day for men and 1 drink a day for women. · Eat plenty of fruits, vegetables, and low-fat dairy products. Eat less saturated and total fats. How is high blood pressure treated? · Your doctor will suggest making lifestyle changes. For example, your doctor may ask you to eat healthy foods, quit smoking, lose extra weight, and be more active. · If lifestyle changes don't help enough or your blood pressure is very high, you will have to take medicine every day. Follow-up care is a key part of your treatment and safety. Be sure to make and go to all appointments, and call your doctor if you are having problems. It's also a good idea to know your test results and keep a list of the medicines you take. Where can you learn more? Go to http://ac-vianey.info/. Enter P501 in the search box to learn more about \"Learning About High Blood Pressure. \"  Current as of: September 21, 2016  Content Version: 11.4  © 8019-7130 [employer]. Care instructions adapted under license by [employer] (which disclaims liability or warranty for this information).  If you have questions about a medical condition or this instruction, always ask your healthcare professional. [employer], [facility] disclaims any warranty or liability for your use of this information. Asthma in Adults: Care Instructions  Your Care Instructions    During an asthma attack, your airways swell and narrow as a reaction to certain things (triggers). This makes it hard to breathe. You may be able to prevent asthma attacks if you avoid the things that set off your asthma symptoms. Keeping your asthma under control and treating symptoms before they get bad can help you avoid severe attacks. If you can control your asthma, you may be able to do all of your normal daily activities. You may also avoid asthma attacks and trips to the hospital.  Follow-up care is a key part of your treatment and safety. Be sure to make and go to all appointments, and call your doctor if you are having problems. It's also a good idea to know your test results and keep a list of the medicines you take. How can you care for yourself at home? · Follow your asthma action plan so you can manage your symptoms at home. An asthma action plan will help you prevent and control airway reactions and will tell you what to do during an asthma attack. If you do not have an asthma action plan, work with your doctor to build one. · Take your asthma medicine exactly as prescribed. Medicine plays an important role in controlling asthma. Talk to your doctor right away if you have any questions about what to take and how to take it. ¨ Use your quick-relief medicine when you have symptoms of an attack. Quick-relief medicine often is an albuterol inhaler. Some people need to use quick-relief medicine before they exercise. ¨ Take your controller medicine every day, not just when you have symptoms. Controller medicine is usually an inhaled corticosteroid. The goal is to prevent problems before they occur. Do not use your controller medicine to try to treat an attack that has already started. It does not work fast enough to help.   ¨ If your doctor prescribed corticosteroid pills to use during an attack, take them as directed. They may take hours to work, but they may shorten the attack and help you breathe better. ¨ Keep your quick-relief medicine with you at all times. · Talk to your doctor before using other medicines. Some medicines, such as aspirin, can cause asthma attacks in some people. · Check yourself for asthma symptoms to know which step to follow in your action plan. Watch for things like being short of breath, having chest tightness, coughing, and wheezing. Also notice if symptoms wake you up at night or if you get tired quickly when you exercise. · If you have a peak flow meter, use it to check how well you are breathing. This can help you predict when an asthma attack is going to occur. Then you can take medicine to prevent the asthma attack or make it less severe. · See your doctor regularly. These visits will help you learn more about asthma and what you can do to control it. Your doctor will monitor your treatment to make sure the medicine is helping you. · Keep track of your asthma attacks and your treatment. After you have had an attack, write down what triggered it, what helped end it, and any concerns you have about your asthma action plan. Take your diary when you see your doctor. You can then review your asthma action plan and decide if it is working. · Do not smoke or allow others to smoke around you. Avoid smoky places. Smoking makes asthma worse. If you need help quitting, talk to your doctor about stop-smoking programs and medicines. These can increase your chances of quitting for good. · Learn what triggers an asthma attack for you, and avoid the triggers when you can. Common triggers include colds, smoke, air pollution, dust, pollen, mold, pets, cockroaches, stress, and cold air. · Avoid colds and the flu. Get a pneumococcal vaccine shot. If you have had one before, ask your doctor whether you need a second dose.  Get a flu vaccine every fall. If you must be around people with colds or the flu, wash your hands often. When should you call for help? Call 911 anytime you think you may need emergency care. For example, call if:  ? · You have severe trouble breathing. ?Call your doctor now or seek immediate medical care if:  ? · Your symptoms do not get better after you have followed your asthma action plan. ? · You cough up yellow, dark brown, or bloody mucus (sputum). ? Watch closely for changes in your health, and be sure to contact your doctor if:  ? · Your coughing and wheezing get worse. ? · You need to use quick-relief medicine on more than 2 days a week (unless it is just for exercise). ? · You need help figuring out what is triggering your asthma attacks. Where can you learn more? Go to http://ac-vianey.info/. Enter P597 in the search box to learn more about \"Asthma in Adults: Care Instructions. \"  Current as of: May 12, 2017  Content Version: 11.4  © 6703-8740 [employer]. Care instructions adapted under license by [employer] (which disclaims liability or warranty for this information). If you have questions about a medical condition or this instruction, always ask your healthcare professional. [address] any warranty or liability for your use of this information.

## 2018-04-06 NOTE — PROGRESS NOTES
HISTORY OF PRESENT ILLNESS  Gerri Rock is a 57 y.o. female. HPI  She wonders if blood pressure medication needs to be adjusted. She feel awful when she takes it; weak and tired    Chest and sinus congestion for > 1 week    No medication for several months, due to limited finances    Diabetes controlled off medications for > 1 year    She now works part time for [employer] since last month, needs to restart ADD medication to keep job. Lost last job due to low productivity     Continues to smoke    Asthma stable    Past Medical History:   Diagnosis Date    ADD (attention deficit disorder)     Anxiety and depression     DDD (degenerative disc disease), lumbar     dr Annette Galeana type 2 diabetes mellitus ([facility] [address]     Epicondylitis, lateral     Fibromyalgia     Hx of diabetes mellitus     Hyperlipidemia LDL goal < 100     Hypertension     OA (osteoarthritis)     Rotator cuff rupture     Tobacco abuse        Current Outpatient Prescriptions on File Prior to Visit   Medication Sig Dispense Refill    escitalopram oxalate (LEXAPRO) 20 mg tablet Take 1 Tab by mouth daily. Indications: Generalized Anxiety Disorder 90 Tab 0    montelukast (SINGULAIR) 10 mg tablet Take 1 Tab by mouth daily. 90 Tab 0     No current facility-administered medications on file prior to visit. Review of Systems   Constitutional: Positive for malaise/fatigue. Negative for chills and fever. HENT: Positive for congestion. Eyes: Negative for blurred vision. Respiratory: Negative for cough, shortness of breath and wheezing. Cardiovascular: Negative for chest pain and palpitations. Gastrointestinal: Negative. Genitourinary: Negative. Psychiatric/Behavioral: The patient is nervous/anxious. Physical Exam   Constitutional: She is oriented to person, place, and time. She appears well-developed and well-nourished. No distress. Neck: No JVD present. Carotid bruit is not present. Cardiovascular: Normal rate, regular rhythm and normal heart sounds. Exam reveals no gallop and no friction rub. No murmur heard. Abdominal: Soft. Bowel sounds are normal. She exhibits no distension and no mass. There is no tenderness. There is no guarding. Musculoskeletal: She exhibits no edema or tenderness. Lymphadenopathy:     She has no cervical adenopathy. Neurological: She is alert and oriented to person, place, and time. No cranial nerve deficit. Skin: Skin is warm and dry. She is not diaphoretic. Psychiatric: Her mood appears anxious. Cognition and memory are normal.       ASSESSMENT and PLAN    ICD-10-CM ICD-9-CM    1. HTN (hypertension), benign I10 401.1 lisinopril-hydroCHLOROthiazide (PRINZIDE, ZESTORETIC) 20-25 mg per tablet   2. Attention deficit disorder (ADD) without hyperactivity F98.8 314.00 dextroamphetamine-amphetamine (ADDERALL) 10 mg tablet   3. Diet-controlled type 2 diabetes mellitus (HCC) E11.9 250.00    4. Depression with anxiety F41.8 300.4    5. Moderate persistent asthma without complication V51.87 947.46 albuterol (PROVENTIL HFA, VENTOLIN HFA, PROAIR HFA) 90 mcg/actuation inhaler      fluticasone-salmeterol (ADVAIR) 250-50 mcg/dose diskus inhaler     Follow-up Disposition:  Return in about 3 months (around 7/6/2018) for htn, asthma, diabetes. reviewed diet, exercise and weight control  very strongly urged to quit smoking to reduce cardiovascular risk  cardiovascular risk and specific lipid/LDL goals reviewed  reviewed medications and side effects in detail  use of aspirin to prevent MI and TIA's discussed    1. Uncontrolled. Start above medication, encouraged to monitor, lifestyle manageemtn    2. Restart medication, follow up q3 months for reevaluation    4. Acute anxiety. Continue current medication    5. Stable      Patient voices understanding and acceptance of this advice and will call back if any further questions or concerns.     An After Visit Summary was printed and given to the patient.

## 2018-04-06 NOTE — PROGRESS NOTES
Chief Complaint   Patient presents with    Nasal Congestion     x 3 weeks    Asthma     x 3 weeks    Fatigue     x 3 weeks     1. Have you been to the ER, urgent care clinic since your last visit? Hospitalized since your last visit? No    2. Have you seen or consulted any other health care providers outside of the [address] [place] [address] since your last visit? Include any pap smears or colon screening. No    Patient has questions regarding her Lisinopril. Patient requests refills on her HCTZ 25mg, Nasonex and Adderall.   Visit Vitals    /73 (BP 1 Location: Left arm, BP Patient Position: Sitting)    Pulse 100    Temp 99.1 °F (37.3 °C) (Oral)    Resp 18    Ht 5' 2.01\" (1.575 m)    Wt 173 lb (78.5 kg)    SpO2 96%    BMI 31.63 kg/m2

## 2018-04-09 DIAGNOSIS — J45.41 MODERATE PERSISTENT ASTHMATIC BRONCHITIS WITH ACUTE EXACERBATION: Primary | ICD-10-CM

## 2018-04-09 RX ORDER — PREDNISONE 10 MG/1
TABLET ORAL
Qty: 21 TAB | Refills: 0 | Status: SHIPPED | OUTPATIENT
Start: 2018-04-09 | End: 2018-09-06 | Stop reason: ALTCHOICE

## 2018-09-06 ENCOUNTER — OFFICE VISIT (OUTPATIENT)
Dept: INTERNAL MEDICINE CLINIC | Age: 63
End: 2018-09-06

## 2018-09-06 VITALS
DIASTOLIC BLOOD PRESSURE: 83 MMHG | BODY MASS INDEX: 29.92 KG/M2 | RESPIRATION RATE: 16 BRPM | HEIGHT: 62 IN | TEMPERATURE: 98.8 F | HEART RATE: 104 BPM | WEIGHT: 162.6 LBS | OXYGEN SATURATION: 95 % | SYSTOLIC BLOOD PRESSURE: 125 MMHG

## 2018-09-06 DIAGNOSIS — I10 HTN (HYPERTENSION), BENIGN: ICD-10-CM

## 2018-09-06 DIAGNOSIS — J45.42 MODERATE PERSISTENT ASTHMA WITH STATUS ASTHMATICUS: ICD-10-CM

## 2018-09-06 DIAGNOSIS — E78.2 MIXED HYPERLIPIDEMIA: ICD-10-CM

## 2018-09-06 DIAGNOSIS — F41.8 DEPRESSION WITH ANXIETY: ICD-10-CM

## 2018-09-06 DIAGNOSIS — E11.9 TYPE 2 DIABETES MELLITUS WITHOUT COMPLICATION, WITHOUT LONG-TERM CURRENT USE OF INSULIN (HCC): ICD-10-CM

## 2018-09-06 DIAGNOSIS — R00.2 HEART PALPITATIONS: ICD-10-CM

## 2018-09-06 DIAGNOSIS — R06.00 DYSPNEA, UNSPECIFIED TYPE: ICD-10-CM

## 2018-09-06 DIAGNOSIS — F98.8 ATTENTION DEFICIT DISORDER (ADD) WITHOUT HYPERACTIVITY: ICD-10-CM

## 2018-09-06 DIAGNOSIS — F32.A ANXIETY AND DEPRESSION: ICD-10-CM

## 2018-09-06 DIAGNOSIS — Z00.00 PHYSICAL EXAM, ANNUAL: Primary | ICD-10-CM

## 2018-09-06 DIAGNOSIS — F41.9 ANXIETY AND DEPRESSION: ICD-10-CM

## 2018-09-06 DIAGNOSIS — I10 ESSENTIAL HYPERTENSION: ICD-10-CM

## 2018-09-06 DIAGNOSIS — H04.301 TEAR DUCT INFECTION, RIGHT: ICD-10-CM

## 2018-09-06 LAB
BILIRUB UR QL STRIP: NEGATIVE
GLUCOSE UR-MCNC: NEGATIVE MG/DL
KETONES P FAST UR STRIP-MCNC: NEGATIVE MG/DL
PH UR STRIP: 5.5 [PH] (ref 4.6–8)
PROT UR QL STRIP: NEGATIVE
SP GR UR STRIP: 1.01 (ref 1–1.03)
UA UROBILINOGEN AMB POC: NORMAL (ref 0.2–1)
URINALYSIS CLARITY POC: CLEAR
URINALYSIS COLOR POC: YELLOW
URINE BLOOD POC: NEGATIVE
URINE LEUKOCYTES POC: NEGATIVE
URINE NITRITES POC: NEGATIVE

## 2018-09-06 RX ORDER — SULFACETAMIDE SODIUM 100 MG/ML
1 SOLUTION/ DROPS OPHTHALMIC
Qty: 15 ML | Status: SHIPPED | OUTPATIENT
Start: 2018-09-06 | End: 2021-01-29 | Stop reason: SDUPTHER

## 2018-09-06 RX ORDER — ALBUTEROL SULFATE 90 UG/1
AEROSOL, METERED RESPIRATORY (INHALATION)
COMMUNITY
End: 2018-09-13 | Stop reason: ALTCHOICE

## 2018-09-06 RX ORDER — ESCITALOPRAM OXALATE 20 MG/1
40 TABLET ORAL DAILY
Qty: 180 TAB | Refills: 3 | Status: SHIPPED | OUTPATIENT
Start: 2018-09-06 | End: 2019-10-05 | Stop reason: SDUPTHER

## 2018-09-06 RX ORDER — MONTELUKAST SODIUM 10 MG/1
10 TABLET ORAL DAILY
Qty: 90 TAB | Refills: 0 | Status: SHIPPED | OUTPATIENT
Start: 2018-09-06 | End: 2019-01-19 | Stop reason: SDUPTHER

## 2018-09-06 RX ORDER — DEXTROAMPHETAMINE SACCHARATE, AMPHETAMINE ASPARTATE, DEXTROAMPHETAMINE SULFATE AND AMPHETAMINE SULFATE 2.5; 2.5; 2.5; 2.5 MG/1; MG/1; MG/1; MG/1
TABLET ORAL
Qty: 90 TAB | Refills: 0 | Status: SHIPPED | OUTPATIENT
Start: 2018-09-06 | End: 2018-10-15 | Stop reason: SDUPTHER

## 2018-09-06 NOTE — PROGRESS NOTES
HISTORY OF PRESENT ILLNESS  Imelda Thomas is a 57 y.o. female presents for routine visit  Asthma   Associated symptoms include shortness of breath. Pertinent negatives include no headaches. Diabetes   Associated symptoms include shortness of breath. Pertinent negatives include no headaches. Medication Evaluation   Associated symptoms include shortness of breath. Pertinent negatives include no headaches. Cannot afford Singuliar and Advair    Paying cash for ventolin    proair on formulary not as effective    Diabetes controlled off medication for sometime, denies hyperglycemia symptoms    Depression controlled on high dose Lexapro    Smoking less    Discharge from eye, right > left, no visual change    Continues to decline mammogram and colonoscopy, noting \"I don't want to know\"    Non exertional dyspnea and palpitations. Denies chest pain. She wants to delay cardiac evaluation/referral    Working 2 jobs    Past Medical History:   Diagnosis Date    ADD (attention deficit disorder)     Anxiety and depression     DDD (degenerative disc disease), lumbar     dr Trupti Bowie Diet-controlled type 2 diabetes mellitus ([facility] [address]     Epicondylitis, lateral     Fibromyalgia     Hx of diabetes mellitus     Hyperlipidemia LDL goal < 100     Hypertension     OA (osteoarthritis)     Rotator cuff rupture     Tobacco abuse        Current Outpatient Prescriptions   Medication Sig    albuterol (PROAIR HFA) 90 mcg/actuation inhaler Take  by inhalation.  beclomethasone (QVAR) 40 mcg/actuation aero Take 1 Puff by inhalation two (2) times a day.  escitalopram oxalate (LEXAPRO) 20 mg tablet Take 2 Tabs by mouth daily. Indications: Generalized Anxiety Disorder, patient states she takes 2 20 mg tabs daily    dextroamphetamine-amphetamine (ADDERALL) 10 mg tablet take 3 tablets by mouth once daily    montelukast (SINGULAIR) 10 mg tablet Take 1 Tab by mouth daily.     sulfacetamide (BLEPH-10) 10 % ophthalmic solution Administer 1 Drop to both eyes every three (3) hours for 10 days.  lisinopril-hydroCHLOROthiazide (PRINZIDE, ZESTORETIC) 20-25 mg per tablet Take 1 Tab by mouth daily. No current facility-administered medications for this visit. Past Surgical History:   Procedure Laterality Date    HX  SECTION      HX MOHS PROCEDURES  8/15    [place]    HX PARTIAL HYSTERECTOMY      HX SHOULDER REPLACEMENT Left                   Review of Systems   HENT: Positive for congestion. Eyes: Positive for discharge. Negative for blurred vision and redness. Respiratory: Positive for cough, sputum production, shortness of breath and wheezing. Gastrointestinal: Negative. Negative for blood in stool. Genitourinary: Negative. Skin: Negative. Neurological: Negative for dizziness, sensory change and headaches. Endo/Heme/Allergies: Negative for polydipsia. Psychiatric/Behavioral: Negative for depression. The patient is not nervous/anxious and does not have insomnia. Visit Vitals    /83 (BP 1 Location: Left arm, BP Patient Position: Sitting)    Pulse (!) 104    Temp 98.8 °F (37.1 °C) (Oral)    Resp 16    Ht 5' 2\" (1.575 m)    Wt 162 lb 9.6 oz (73.8 kg)    SpO2 95%    BMI 29.74 kg/m2     Physical Exam   Constitutional: She is oriented to person, place, and time. She appears well-developed and well-nourished. No distress. HENT:   Right Ear: External ear normal.   Left Ear: External ear normal.   Nose: Nose normal.   Mouth/Throat: Oropharynx is clear and moist. No oropharyngeal exudate. Eyes: Conjunctivae and EOM are normal. Pupils are equal, round, and reactive to light. Right eye exhibits discharge. Left eye exhibits no discharge. No scleral icterus. Neck: Normal range of motion. Neck supple. Cardiovascular: Regular rhythm and normal heart sounds. Tachycardia present. Exam reveals no gallop and no friction rub. No murmur heard.   Pulmonary/Chest: Effort normal and breath sounds normal.   Musculoskeletal: She exhibits no edema, tenderness or deformity. Lymphadenopathy:     She has no cervical adenopathy. Neurological: She is alert and oriented to person, place, and time. No cranial nerve deficit. Skin: Skin is warm. She is not diaphoretic. Psychiatric: Her mood appears anxious. Her speech is rapid and/or pressured and tangential. Cognition and memory are normal.       ASSESSMENT and PLAN    ICD-10-CM ICD-9-CM    1. Physical exam, annual Z00.00 V70.0    2. Anxiety and depression F41.9 300.00 escitalopram oxalate (LEXAPRO) 20 mg tablet    F32.9 311    3. Attention deficit disorder (ADD) without hyperactivity F98.8 314.00 dextroamphetamine-amphetamine (ADDERALL) 10 mg tablet   4. HTN (hypertension), benign O28 365.7 METABOLIC PANEL, COMPREHENSIVE      AMB POC URINALYSIS DIP STICK AUTO W/O MICRO   5. Depression with anxiety F41.8 300.4    6. Moderate persistent asthma with status asthmaticus J45.42 493.91 montelukast (SINGULAIR) 10 mg tablet   7. Essential hypertension L28 874.0 METABOLIC PANEL, COMPREHENSIVE   8. Type 2 diabetes mellitus without complication, without long-term current use of insulin (HCC) I41.5 084.39 METABOLIC PANEL, COMPREHENSIVE      HEMOGLOBIN A1C WITH EAG   9. Mixed hyperlipidemia E78.2 272.2 LIPID PANEL      METABOLIC PANEL, COMPREHENSIVE   10. Tear duct infection, right H04.301 373.11 sulfacetamide (BLEPH-10) 10 % ophthalmic solution   11. Heart palpitations R00.2 785.1    12. Dyspnea, unspecified type R06.00 786.09      Follow-up Disposition:  Return in about 6 months (around 3/6/2019) for asthma, add, htn, hyperlipidemia.   current treatment plan is effective, no change in therapy  lab results and schedule of future lab studies reviewed with patient  reviewed diet, exercise and weight control  very strongly urged to quit smoking to reduce cardiovascular risk  cardiovascular risk and specific lipid/LDL goals reviewed  reviewed medications and side effects in detail  specific diabetic recommendations: low cholesterol diet, weight control and daily exercise discussed, home glucose monitoring emphasized, annual eye examinations at Ophthalmology discussed and glycohemoglobin and other lab monitoring discussed  use of aspirin to prevent MI and TIA's discussed    Patient voices understanding and acceptance of this advice and will call back if any further questions or concerns. An After Visit Summary was printed and given to the patient.

## 2018-09-06 NOTE — PROGRESS NOTES
Room 8     Identified pt with two pt identifiers(name and ). Reviewed record in preparation for visit and have obtained necessary documentation. Chief Complaint   Patient presents with    Asthma    Diabetes    Medication Evaluation     please address patient's concern about how to [facility] [address] [facility]   Topic    Hepatitis C Screening     Pneumococcal 19-64 Medium Risk (1 of 1 - PPSV23)    DTaP/Tdap/Td series (1 - Tdap)    ZOSTER VACCINE AGE 60>     EYE EXAM RETINAL OR DILATED Q1     FOBT Q 1 YEAR AGE 50-75     LIPID PANEL Q1     FOOT EXAM Q1     HEMOGLOBIN A1C Q6M     MICROALBUMIN Q1     Influenza Age 5 to Adult     Bone Densitometry (Dexa) Screening        Coordination of Care Questionnaire:  :   1) Have you been to an emergency room, urgent care clinic since your last visit? no   Hospitalized since your last visit? no             2. Have seen or consulted any other health care provider since your last visit? NO  If yes, where when, and reason for visit? 3) Do you have an Advanced Directive/ Living Will in place? NO  If yes, do we have a copy on file NO  If no, would you like information NO    Patient is accompanied by self I have received verbal consent from Nissa Ibanez to discuss any/all medical information while they are present in the room.     Visit Vitals    /83 (BP 1 Location: Left arm, BP Patient Position: Sitting)    Pulse (!) 104    Temp 98.8 °F (37.1 °C) (Oral)    Resp 16    Ht 5' 2\" (1.575 m)    Wt 162 lb 9.6 oz (73.8 kg)    SpO2 95%    BMI 29.74 kg/m2     Mary Guerrero LPN

## 2018-09-06 NOTE — LETTER
NOTIFICATION RETURN TO WORK / SCHOOL 
 
9/6/2018 12:06 PM 
 
Ms. Geeta Abel [address] [facility] [address] [id number] To Whom It May Concern: 
 
Geeta Abel is currently under the care of [facility]. She will return to work/school on: September 7, 2018 If there are questions or concerns please have the patient contact our office. Sincerely, Annette Alvarado NP

## 2018-09-06 NOTE — PATIENT INSTRUCTIONS
Asthma in Adults: Care Instructions  Your Care Instructions    During an asthma attack, your airways swell and narrow as a reaction to certain things (triggers). This makes it hard to breathe. You may be able to prevent asthma attacks if you avoid the things that set off your asthma symptoms. Keeping your asthma under control and treating symptoms before they get bad can help you avoid severe attacks. If you can control your asthma, you may be able to do all of your normal daily activities. You may also avoid asthma attacks and trips to the hospital.  Follow-up care is a key part of your treatment and safety. Be sure to make and go to all appointments, and call your doctor if you are having problems. It's also a good idea to know your test results and keep a list of the medicines you take. How can you care for yourself at home? · Follow your asthma action plan so you can manage your symptoms at home. An asthma action plan will help you prevent and control airway reactions and will tell you what to do during an asthma attack. If you do not have an asthma action plan, work with your doctor to build one. · Take your asthma medicine exactly as prescribed. Medicine plays an important role in controlling asthma. Talk to your doctor right away if you have any questions about what to take and how to take it. ¨ Use your quick-relief medicine when you have symptoms of an attack. Quick-relief medicine often is an albuterol inhaler. Some people need to use quick-relief medicine before they exercise. ¨ Take your controller medicine every day, not just when you have symptoms. Controller medicine is usually an inhaled corticosteroid. The goal is to prevent problems before they occur. Do not use your controller medicine to try to treat an attack that has already started. It does not work fast enough to help. ¨ If your doctor prescribed corticosteroid pills to use during an attack, take them as directed.  They may take hours to work, but they may shorten the attack and help you breathe better. ¨ Keep your quick-relief medicine with you at all times. · Talk to your doctor before using other medicines. Some medicines, such as aspirin, can cause asthma attacks in some people. · Check yourself for asthma symptoms to know which step to follow in your action plan. Watch for things like being short of breath, having chest tightness, coughing, and wheezing. Also notice if symptoms wake you up at night or if you get tired quickly when you exercise. · If you have a peak flow meter, use it to check how well you are breathing. This can help you predict when an asthma attack is going to occur. Then you can take medicine to prevent the asthma attack or make it less severe. · See your doctor regularly. These visits will help you learn more about asthma and what you can do to control it. Your doctor will monitor your treatment to make sure the medicine is helping you. · Keep track of your asthma attacks and your treatment. After you have had an attack, write down what triggered it, what helped end it, and any concerns you have about your asthma action plan. Take your diary when you see your doctor. You can then review your asthma action plan and decide if it is working. · Do not smoke or allow others to smoke around you. Avoid smoky places. Smoking makes asthma worse. If you need help quitting, talk to your doctor about stop-smoking programs and medicines. These can increase your chances of quitting for good. · Learn what triggers an asthma attack for you, and avoid the triggers when you can. Common triggers include colds, smoke, air pollution, dust, pollen, mold, pets, cockroaches, stress, and cold air. · Avoid colds and the flu. Get a pneumococcal vaccine shot. If you have had one before, ask your doctor whether you need a second dose. Get a flu vaccine every fall.  If you must be around people with colds or the flu, wash your hands often.  When should you call for help? Call 911 anytime you think you may need emergency care. For example, call if:    · You have severe trouble breathing.    Call your doctor now or seek immediate medical care if:    · Your symptoms do not get better after you have followed your asthma action plan.     · You cough up yellow, dark brown, or bloody mucus (sputum).    Watch closely for changes in your health, and be sure to contact your doctor if:    · Your coughing and wheezing get worse.     · You need to use quick-relief medicine on more than 2 days a week (unless it is just for exercise).     · You need help figuring out what is triggering your asthma attacks. Where can you learn more? Go to http://ac-vianey.info/. Enter P597 in the search box to learn more about \"Asthma in Adults: Care Instructions. \"  Current as of: December 6, 2017  Content Version: 11.7  © 8627-5064 [employer]. Care instructions adapted under license by [employer] (which disclaims liability or warranty for this information). If you have questions about a medical condition or this instruction, always ask your healthcare professional. [place] [address] any warranty or liability for your use of this information. Learning About Asthma  What is asthma? Asthma is a long-term condition that affects your breathing. It causes the airways that lead to the lungs to swell. People with asthma may have asthma attacks. During an asthma attack, the airways tighten and become narrower. This makes it hard to breathe, and you may wheeze or cough. If you have a bad asthma attack, you may need emergency care. Asthma affects people in different ways. Some people only have asthma attacks during allergy season, or when they breathe in cold air, or when they exercise. Others have many bad attacks that send them to the doctor often. What are the symptoms?   Symptoms of asthma can be mild or severe. You may have mild attacks now and then, you may have severe symptoms every day, or you may have something in between. How often you have symptoms can also change. When you have asthma, you may:  · Wheeze, making a loud or soft whistling noise when you breathe in and out. · Cough a lot. · Feel tightness in your chest.  · Feel short of breath. · Have trouble sleeping because of coughing or having a hard time breathing. · Get tired quickly during exercise. Your symptoms may be worse at night. How can you prevent asthma attacks? Certain things can make asthma symptoms worse. These are called triggers. When you are around a trigger, an asthma attack is more likely. Common triggers include:  · Cigarette smoke or air pollution. · Things you are allergic to, such as:  ¨ Pollen, mold, or dust mites. ¨ Pet hair, skin, or saliva. · Illnesses, like colds, flu, or pneumonia. · Exercise. · Dry, cold air. Here are some ways to avoid a few common triggers:  · Do not smoke or allow others to smoke around you. If you need help quitting, talk to your doctor about stop-smoking programs and medicines. These can increase your chances of quitting for good. · If there is a lot of pollution, pollen, or dust outside, stay at home and keep your windows closed. Use an air conditioner or air filter in your home. Check your local weather report or newspaper for air quality and pollen reports. · Get the flu vaccine every year. Talk to your doctor about getting a pneumococcal shot. Wash your hands often to prevent infections. · Avoid exercising outdoors in cold weather. If you are outdoors in cold weather, wear a scarf around your face and breathe through your nose. How is asthma treated? There are two parts to treating asthma, which are outlined in your asthma action plan. The goals are to:  · Control asthma over the long term. The asthma action plan tells you which medicine you may need to take every day.  This is called a controller medicine. It helps to reduce the swelling of the airways and prevent asthma attacks. · Treat asthma attacks when they occur. The asthma action plan tells you what to do when you have an asthma attack. It helps you identify triggers that can cause your attacks. You use quick-relief medicine during an attack. The asthma plan also helps you track your symptoms and know how well the treatment is working. Follow-up care is a key part of your treatment and safety. Be sure to make and go to all appointments, and call your doctor if you are having problems. It's also a good idea to know your test results and keep a list of the medicines you take. Where can you learn more? Go to http://[facility] Home[facility]ey.info/. Enter 5099 0090 in the search box to learn more about \"Learning About Asthma. \"  Current as of: December 6, 2017  Content Version: 11.7  © 2009-2676 [employer]. Care instructions adapted under license by [employer] (which disclaims liability or warranty for this information). If you have questions about a medical condition or this instruction, always ask your healthcare professional. [place] [address] any warranty or liability for your use of this information. Blocked Tear Duct in Children: Care Instructions  Your Care Instructions  Tears normally drain from the eye through small tubes called tear ducts, which stretch from the eye into the nose. In babies, a blocked tear duct occurs when these tubes get blocked or do not open properly. This can cause your child's eye to be teary and produce a yellowish white substance. If a tear duct remains blocked, the tear duct sac fills with fluid and may become swollen and inflamed. Sometimes it can get infected. In most cases, babies born with a blocked tear duct do not need treatment. The duct tends to open up on its own by 1 year of age.  If the duct does not open, a procedure called probing can be used to open it. In the meantime, you can take care of your child at home by keeping the eye clean. This can help prevent infection. If the duct gets infected, your doctor will prescribe antibiotics. Follow-up care is a key part of your child's treatment and safety. Be sure to make and go to all appointments, and call your doctor if your child is having problems. It's also a good idea to know your child's test results and keep a list of the medicines your child takes. How can you care for your child at home? · Keep your child's eye clean:  ¨ Moisten a clean cotton ball or washcloth with warm (not hot) water, and gently wipe from the inner (near the nose) to the outer part of the eye. With each wipe, use a new or clean part of the cotton ball or washcloth. ¨ If your child's eyelashes are crusty with mucus, clean them with a moist cotton ball using a gentle, downward motion. If the eyelids get stuck together, place a clean, warm, wet cotton ball over that eye for a few minutes to help loosen the crust.  · Massage your child's tear duct. Press gently on the inner corner of the eye in a downward motion. Make sure that your hands are clean and your nails are short. · If the doctor prescribed antibiotic pills, eyedrops, or ointment for your child, give them as directed. Do not stop using them just because your child's eye gets better. Your child needs to take the full course of antibiotics. · To put in eyedrops or ointment:  ¨ Tilt your child's head back, and pull the lower eyelid down with one finger. ¨ Drop or squirt the medicine inside the lower lid. ¨ Close your child's eye for 30 to 60 seconds to let the drops or ointment move around. ¨ Do not touch the ointment or dropper tip to the eyelashes or any other surface. When should you call for help?   Call your doctor now or seek immediate medical care if:    · Your child has signs of infection, such as:  ¨ Increased swelling and redness in or around the eye, eyelid, or nose. ¨ Pus draining from the eye. ¨ A fever.    Watch closely for changes in your child's health, and be sure to contact your doctor if:    · The drainage from your child's eye gets worse.     · Your child's tear duct does not open up by the time he or she is 3year old. Where can you learn more? Go to http://ac-vianey.info/. Enter N953 in the search box to learn more about \"Blocked Tear Duct in Children: Care Instructions. \"  Current as of: May 12, 2017  Content Version: 11.7  © 3715-3867 [employer]. Care instructions adapted under license by [employer] (which disclaims liability or warranty for this information). If you have questions about a medical condition or this instruction, always ask your healthcare professional. [address] any warranty or liability for your use of this information.

## 2018-09-06 NOTE — MR AVS SNAPSHOT
[address] [facility] [address] 
[phone] Patient: Rebekah Guerra MRN: [id number] YPA:[id number] Visit Information Date & Time Provider Department Dept. Phone Encounter #  
 9/6/2018 11:15 AM Fl[facility] [id number] [facility] [phone] [id number] Follow-up Instructions Return in about 6 months (around 3/6/2019) for asthma, add, htn, hyperlipidemia. Upcoming Health Maintenance Date Due Hepatitis C Screening 1955 Pneumococcal 19-64 Medium Risk (1 of 1 - PPSV23) 11/20/1972 DTaP/Tdap/Td series (1 - Tdap) 11/20/1974 ZOSTER VACCINE AGE 60> 9/20/2013 EYE EXAM RETINAL OR DILATED Q1 3/19/2016 FOBT Q 1 YEAR AGE 50-75 3/19/2016 LIPID PANEL Q1 3/1/2017 FOOT EXAM Q1 3/2/2017 HEMOGLOBIN A1C Q6M 1/10/2018 MICROALBUMIN Q1 2/8/2018 Influenza Age 5 to Adult 8/1/2018 Bone Densitometry (Dexa) Screening 11/20/2018 BREAST CANCER SCRN MAMMOGRAM 2/3/2019 PAP AKA CERVICAL CYTOLOGY 2/3/2020 Allergies as of 9/6/2018  Review Complete On: 9/6/2018 By: Alaina Jacobson LPN Severity Noted Reaction Type Reactions Codeine  12/28/2010    Other (comments) Severe headaches Erythromycin  02/03/2017    Rash Morphine  12/28/2010    Hives Neomycin  02/03/2017    Rash Current Immunizations  Never Reviewed No immunizations on file. Not reviewed this visit You Were Diagnosed With   
  
 Codes Comments Physical exam, annual    -  Primary ICD-10-CM: Z00.00 ICD-9-CM: V70.0 Anxiety and depression     ICD-10-CM: F41.9, F32.9 ICD-9-CM: 300.00, 311 Attention deficit disorder (ADD) without hyperactivity     ICD-10-CM: F98.8 ICD-9-CM: 314.00 HTN (hypertension), benign     ICD-10-CM: I10 
ICD-9-CM: 401.1 Depression with anxiety     ICD-10-CM: F41.8 ICD-9-CM: 300.4  Moderate persistent asthma with status asthmaticus ICD-10-CM: J45.42 
ICD-9-CM: 493.91 Essential hypertension     ICD-10-CM: I10 
ICD-9-CM: 401.9 Type 2 diabetes mellitus without complication, without long-term current use of insulin (HCC)     ICD-10-CM: E11.9 ICD-9-CM: 250.00 Mixed hyperlipidemia     ICD-10-CM: E78.2 ICD-9-CM: 272.2 Tear duct infection, right     ICD-10-CM: H04.301 ICD-9-CM: 373.11 Heart palpitations     ICD-10-CM: R00.2 ICD-9-CM: 785.1 Dyspnea, unspecified type     ICD-10-CM: R06.00 
ICD-9-CM: 786.09 Vitals BP Pulse Temp Resp Height(growth percentile) Weight(growth percentile) 125/83 (BP 1 Location: Left arm, BP Patient Position: Sitting) (!) 104 98.8 °F (37.1 °C) (Oral) 16 5' 2\" (1.575 m) 162 lb 9.6 oz (73.8 kg) SpO2 BMI OB Status Smoking Status 95% 29.74 kg/m2 Postmenopausal Former Smoker BMI and BSA Data Body Mass Index Body Surface Area  
 29.74 kg/m 2 1.8 m 2 Preferred Pharmacy Pharmacy Name Phone [facility] [address] [phone] Your Updated Medication List  
  
   
This list is accurate as of 9/6/18 12:08 PM.  Always use your most recent med list.  
  
  
  
  
 beclomethasone 40 mcg/actuation Aero Commonly known as:  QVAR Take 1 Puff by inhalation two (2) times a day. dextroamphetamine-amphetamine 10 mg tablet Commonly known as:  ADDERALL  
take 3 tablets by mouth once daily  
  
 escitalopram oxalate 20 mg tablet Commonly known as:  Shane Setters Take 2 Tabs by mouth daily. Indications: Generalized Anxiety Disorder, patient states she takes 2 20 mg tabs daily  
  
 lisinopril-hydroCHLOROthiazide 20-25 mg per tablet Commonly known as:  Katrinka Fat Take 1 Tab by mouth daily. montelukast 10 mg tablet Commonly known as:  SINGULAIR Take 1 Tab by mouth daily. PROAIR HFA 90 mcg/actuation inhaler Generic drug:  albuterol Take  by inhalation.   
  
 sulfacetamide 10 % ophthalmic solution Commonly known as:  BLEPH-10 Administer 1 Drop to both eyes every three (3) hours for 10 days. Prescriptions Printed Refills  
 dextroamphetamine-amphetamine (ADDERALL) 10 mg tablet 0 Sig: take 3 tablets by mouth once daily Class: Print  
 sulfacetamide (BLEPH-10) 10 % ophthalmic solution o Sig: Administer 1 Drop to both eyes every three (3) hours for 10 days. Class: Print Route: Both Eyes Prescriptions Sent to Pharmacy Refills  
 beclomethasone (QVAR) 40 mcg/actuation aero 3 Sig: Take 1 Puff by inhalation two (2) times a day. Class: Normal  
 Pharmacy: [address] [place] [address] Ph #: [phone] Route: Inhalation  
 escitalopram oxalate (LEXAPRO) 20 mg tablet 3 Sig: Take 2 Tabs by mouth daily. Indications: Generalized Anxiety Disorder, patient states she takes 2 20 mg tabs daily Class: Normal  
 Pharmacy: [address] [place] [address] Ph #: [phone] Route: Oral  
 montelukast (SINGULAIR) 10 mg tablet 0 Sig: Take 1 Tab by mouth daily. Class: Normal  
 Pharmacy: [address] [place] [address] Ph #: [phone] Route: Oral  
  
We Performed the Following AMB POC URINALYSIS DIP STICK AUTO W/O MICRO [12395 CPT(R)] HEMOGLOBIN A1C WITH EAG [95308 CPT(R)] LIPID PANEL [49765 CPT(R)] METABOLIC PANEL, COMPREHENSIVE [95495 CPT(R)] Follow-up Instructions Return in about 6 months (around 3/6/2019) for asthma, add, htn, hyperlipidemia. Patient Instructions Asthma in Adults: Care Instructions Your Care Instructions During an asthma attack, your airways swell and narrow as a reaction to certain things (triggers). This makes it hard to breathe. You may be able to prevent asthma attacks if you avoid the things that set off your asthma symptoms.  Keeping your asthma under control and treating symptoms before they get bad can help you avoid severe attacks. If you can control your asthma, you may be able to do all of your normal daily activities. You may also avoid asthma attacks and trips to the hospital. 
Follow-up care is a key part of your treatment and safety. Be sure to make and go to all appointments, and call your doctor if you are having problems. It's also a good idea to know your test results and keep a list of the medicines you take. How can you care for yourself at home? · Follow your asthma action plan so you can manage your symptoms at home. An asthma action plan will help you prevent and control airway reactions and will tell you what to do during an asthma attack. If you do not have an asthma action plan, work with your doctor to build one. · Take your asthma medicine exactly as prescribed. Medicine plays an important role in controlling asthma. Talk to your doctor right away if you have any questions about what to take and how to take it. ¨ Use your quick-relief medicine when you have symptoms of an attack. Quick-relief medicine often is an albuterol inhaler. Some people need to use quick-relief medicine before they exercise. ¨ Take your controller medicine every day, not just when you have symptoms. Controller medicine is usually an inhaled corticosteroid. The goal is to prevent problems before they occur. Do not use your controller medicine to try to treat an attack that has already started. It does not work fast enough to help. ¨ If your doctor prescribed corticosteroid pills to use during an attack, take them as directed. They may take hours to work, but they may shorten the attack and help you breathe better. ¨ Keep your quick-relief medicine with you at all times. · Talk to your doctor before using other medicines. Some medicines, such as aspirin, can cause asthma attacks in some people.  
· Check yourself for asthma symptoms to know which step to follow in your action plan. Watch for things like being short of breath, having chest tightness, coughing, and wheezing. Also notice if symptoms wake you up at night or if you get tired quickly when you exercise. · If you have a peak flow meter, use it to check how well you are breathing. This can help you predict when an asthma attack is going to occur. Then you can take medicine to prevent the asthma attack or make it less severe. · See your doctor regularly. These visits will help you learn more about asthma and what you can do to control it. Your doctor will monitor your treatment to make sure the medicine is helping you. · Keep track of your asthma attacks and your treatment. After you have had an attack, write down what triggered it, what helped end it, and any concerns you have about your asthma action plan. Take your diary when you see your doctor. You can then review your asthma action plan and decide if it is working. · Do not smoke or allow others to smoke around you. Avoid smoky places. Smoking makes asthma worse. If you need help quitting, talk to your doctor about stop-smoking programs and medicines. These can increase your chances of quitting for good. · Learn what triggers an asthma attack for you, and avoid the triggers when you can. Common triggers include colds, smoke, air pollution, dust, pollen, mold, pets, cockroaches, stress, and cold air. · Avoid colds and the flu. Get a pneumococcal vaccine shot. If you have had one before, ask your doctor whether you need a second dose. Get a flu vaccine every fall. If you must be around people with colds or the flu, wash your hands often. When should you call for help? Call 911 anytime you think you may need emergency care.  For example, call if: 
  · You have severe trouble breathing.  
 Call your doctor now or seek immediate medical care if: 
  · Your symptoms do not get better after you have followed your asthma action plan.  
  · You cough up yellow, dark brown, or bloody mucus (sputum).  
 Watch closely for changes in your health, and be sure to contact your doctor if: 
  · Your coughing and wheezing get worse.  
  · You need to use quick-relief medicine on more than 2 days a week (unless it is just for exercise).  
  · You need help figuring out what is triggering your asthma attacks. Where can you learn more? Go to http://ac-vianey.info/. Enter P597 in the search box to learn more about \"Asthma in Adults: Care Instructions. \" Current as of: December 6, 2017 Content Version: 11.7 © 2707-2746 [employer]. Care instructions adapted under license by [employer] (which disclaims liability or warranty for this information). If you have questions about a medical condition or this instruction, always ask your healthcare professional. [address] any warranty or liability for your use of this information. Learning About Asthma What is asthma? Asthma is a long-term condition that affects your breathing. It causes the airways that lead to the lungs to swell. People with asthma may have asthma attacks. During an asthma attack, the airways tighten and become narrower. This makes it hard to breathe, and you may wheeze or cough. If you have a bad asthma attack, you may need emergency care. Asthma affects people in different ways. Some people only have asthma attacks during allergy season, or when they breathe in cold air, or when they exercise. Others have many bad attacks that send them to the doctor often. What are the symptoms? Symptoms of asthma can be mild or severe. You may have mild attacks now and then, you may have severe symptoms every day, or you may have something in between. How often you have symptoms can also change. When you have asthma, you may: · Wheeze, making a loud or soft whistling noise when you breathe in and out. · Cough a lot.  
· Feel tightness in your chest. 
· Feel short of breath. · Have trouble sleeping because of coughing or having a hard time breathing. · Get tired quickly during exercise. Your symptoms may be worse at night. How can you prevent asthma attacks? Certain things can make asthma symptoms worse. These are called triggers. When you are around a trigger, an asthma attack is more likely. Common triggers include: · Cigarette smoke or air pollution. · Things you are allergic to, such as: 
¨ Pollen, mold, or dust mites. ¨ Pet hair, skin, or saliva. · Illnesses, like colds, flu, or pneumonia. · Exercise. · Dry, cold air. Here are some ways to avoid a few common triggers: · Do not smoke or allow others to smoke around you. If you need help quitting, talk to your doctor about stop-smoking programs and medicines. These can increase your chances of quitting for good. · If there is a lot of pollution, pollen, or dust outside, stay at home and keep your windows closed. Use an air conditioner or air filter in your home. Check your local weather report or newspaper for air quality and pollen reports. · Get the flu vaccine every year. Talk to your doctor about getting a pneumococcal shot. Wash your hands often to prevent infections. · Avoid exercising outdoors in cold weather. If you are outdoors in cold weather, wear a scarf around your face and breathe through your nose. How is asthma treated? There are two parts to treating asthma, which are outlined in your asthma action plan. The goals are to: 
· Control asthma over the long term. The asthma action plan tells you which medicine you may need to take every day. This is called a controller medicine. It helps to reduce the swelling of the airways and prevent asthma attacks. · Treat asthma attacks when they occur. The asthma action plan tells you what to do when you have an asthma attack. It helps you identify triggers that can cause your attacks.  You use quick-relief medicine during an attack. The asthma plan also helps you track your symptoms and know how well the treatment is working. Follow-up care is a key part of your treatment and safety. Be sure to make and go to all appointments, and call your doctor if you are having problems. It's also a good idea to know your test results and keep a list of the medicines you take. Where can you learn more? Go to http://ac-vianey.info/. Enter 9187 5645 in the search box to learn more about \"Learning About Asthma. \" Current as of: December 6, 2017 Content Version: 11.7 © 4374-3640 Handshake. Care instructions adapted under license by [employer] (which disclaims liability or warranty for this information). If you have questions about a medical condition or this instruction, always ask your healthcare professional. [address] any warranty or liability for your use of this information. Blocked Tear Duct in Children: Care Instructions Your Care Instructions Tears normally drain from the eye through small tubes called tear ducts, which stretch from the eye into the nose. In babies, a blocked tear duct occurs when these tubes get blocked or do not open properly. This can cause your child's eye to be teary and produce a yellowish white substance. If a tear duct remains blocked, the tear duct sac fills with fluid and may become swollen and inflamed. Sometimes it can get infected. In most cases, babies born with a blocked tear duct do not need treatment. The duct tends to open up on its own by 1 year of age. If the duct does not open, a procedure called probing can be used to open it. In the meantime, you can take care of your child at home by keeping the eye clean. This can help prevent infection. If the duct gets infected, your doctor will prescribe antibiotics. Follow-up care is a key part of your child's treatment and safety.  Be sure to make and go to all appointments, and call your doctor if your child is having problems. It's also a good idea to know your child's test results and keep a list of the medicines your child takes. How can you care for your child at home? · Keep your child's eye clean: ¨ Moisten a clean cotton ball or washcloth with warm (not hot) water, and gently wipe from the inner (near the nose) to the outer part of the eye. With each wipe, use a new or clean part of the cotton ball or washcloth. ¨ If your child's eyelashes are crusty with mucus, clean them with a moist cotton ball using a gentle, downward motion. If the eyelids get stuck together, place a clean, warm, wet cotton ball over that eye for a few minutes to help loosen the crust. 
· Massage your child's tear duct. Press gently on the inner corner of the eye in a downward motion. Make sure that your hands are clean and your nails are short. · If the doctor prescribed antibiotic pills, eyedrops, or ointment for your child, give them as directed. Do not stop using them just because your child's eye gets better. Your child needs to take the full course of antibiotics. · To put in eyedrops or ointment: ¨ Tilt your child's head back, and pull the lower eyelid down with one finger. ¨ Drop or squirt the medicine inside the lower lid. ¨ Close your child's eye for 30 to 60 seconds to let the drops or ointment move around. ¨ Do not touch the ointment or dropper tip to the eyelashes or any other surface. When should you call for help? Call your doctor now or seek immediate medical care if: 
  · Your child has signs of infection, such as: 
¨ Increased swelling and redness in or around the eye, eyelid, or nose. ¨ Pus draining from the eye. ¨ A fever.  
 Watch closely for changes in your child's health, and be sure to contact your doctor if: 
  · The drainage from your child's eye gets worse.  
  · Your child's tear duct does not open up by the time he or she is 3year old. Where can you learn more?  
Go to http://ac-vianey.info/. Enter M682 in the search box to learn more about \"Blocked Tear Duct in Children: Care Instructions. \" Current as of: May 12, 2017 Content Version: 11.7 © 5367-3988 Healthwise, Incorporated. Care instructions adapted under license by [employer] (which disclaims liability or warranty for this information). If you have questions about a medical condition or this instruction, always ask your healthcare professional. [address] any warranty or liability for your use of this information. Introducing [facility]! Charly Sewell introduces Smartdate patient portal. Now you can access parts of your medical record, email your doctor's office, and request medication refills online. 1. In your internet browser, go to [email or web address]. [email or web address] 2. Click on the First Time User? Click Here link in the Sign In box. You will see the New Member Sign Up page. 3. Enter your Smartdate Access Code exactly as it appears below. You will not need to use this code after youve completed the sign-up process. If you do not sign up before the expiration date, you must request a new code. · Smartdate Access Code: [email or web address] Expires: 12/5/2018 12:08 PM 
 
4. Enter the last four digits of your Social Security Number (xxxx) and Date of Birth (mm/dd/yyyy) as indicated and click Submit. You will be taken to the next sign-up page. 5. Create a Smartdate ID. This will be your Smartdate login ID and cannot be changed, so think of one that is secure and easy to remember. 6. Create a Smartdate password. You can change your password at any time. 7. Enter your Password Reset Question and Answer. This can be used at a later time if you forget your password. 8. Enter your e-mail address. You will receive e-mail notification when new information is available in [address] 9. Click Sign Up.  You can now view and download portions of your medical record. 10. Click the Download Summary menu link to download a portable copy of your medical information. If you have questions, please visit the Frequently Asked Questions section of the [employer] website. Remember, [employer] is NOT to be used for urgent needs. For medical emergencies, dial 911. Now available from your iPhone and Android! Please provide this summary of care documentation to your next provider. Your primary care clinician is listed as Janet Tuttle. If you have any questions after today's visit, please call [phone].

## 2018-09-07 DIAGNOSIS — E78.2 MIXED HYPERLIPIDEMIA: Primary | ICD-10-CM

## 2018-09-07 LAB
ALBUMIN SERPL-MCNC: 4.9 G/DL (ref 3.6–4.8)
ALBUMIN/GLOB SERPL: 2.1 {RATIO} (ref 1.2–2.2)
ALP SERPL-CCNC: 114 IU/L (ref 39–117)
ALT SERPL-CCNC: 34 IU/L (ref 0–32)
AST SERPL-CCNC: 19 IU/L (ref 0–40)
BILIRUB SERPL-MCNC: 0.3 MG/DL (ref 0–1.2)
BUN SERPL-MCNC: 13 MG/DL (ref 8–27)
BUN/CREAT SERPL: 19 (ref 12–28)
CALCIUM SERPL-MCNC: 10.5 MG/DL (ref 8.7–10.3)
CHLORIDE SERPL-SCNC: 99 MMOL/L (ref 96–106)
CHOLEST SERPL-MCNC: 255 MG/DL (ref 100–199)
CO2 SERPL-SCNC: 25 MMOL/L (ref 20–29)
CREAT SERPL-MCNC: 0.67 MG/DL (ref 0.57–1)
EST. AVERAGE GLUCOSE BLD GHB EST-MCNC: 131 MG/DL
GLOBULIN SER CALC-MCNC: 2.3 G/DL (ref 1.5–4.5)
GLUCOSE SERPL-MCNC: 106 MG/DL (ref 65–99)
HBA1C MFR BLD: 6.2 % (ref 4.8–5.6)
HDLC SERPL-MCNC: 42 MG/DL
LDLC SERPL CALC-MCNC: 148 MG/DL (ref 0–99)
POTASSIUM SERPL-SCNC: 4 MMOL/L (ref 3.5–5.2)
PROT SERPL-MCNC: 7.2 G/DL (ref 6–8.5)
SODIUM SERPL-SCNC: 142 MMOL/L (ref 134–144)
TRIGL SERPL-MCNC: 323 MG/DL (ref 0–149)
VLDLC SERPL CALC-MCNC: 65 MG/DL (ref 5–40)

## 2018-09-07 RX ORDER — ATORVASTATIN CALCIUM 20 MG/1
20 TABLET, FILM COATED ORAL DAILY
Qty: 90 TAB | Refills: 0 | Status: SHIPPED | OUTPATIENT
Start: 2018-09-07 | End: 2018-09-10 | Stop reason: SINTOL

## 2018-09-13 ENCOUNTER — TELEPHONE (OUTPATIENT)
Dept: INTERNAL MEDICINE CLINIC | Age: 63
End: 2018-09-13

## 2018-09-13 RX ORDER — ALBUTEROL SULFATE 90 UG/1
2 AEROSOL, METERED RESPIRATORY (INHALATION)
Qty: 1 INHALER | Refills: 6 | Status: SHIPPED | OUTPATIENT
Start: 2018-09-13 | End: 2019-11-22 | Stop reason: SDUPTHER

## 2018-11-19 DIAGNOSIS — F98.8 ATTENTION DEFICIT DISORDER (ADD) WITHOUT HYPERACTIVITY: ICD-10-CM

## 2018-11-19 NOTE — TELEPHONE ENCOUNTER
Medication refill request:    Last Office Visit:   September 06, 2018  Next Office Visit:  No future appointments.

## 2018-11-20 RX ORDER — DEXTROAMPHETAMINE SACCHARATE, AMPHETAMINE ASPARTATE, DEXTROAMPHETAMINE SULFATE AND AMPHETAMINE SULFATE 2.5; 2.5; 2.5; 2.5 MG/1; MG/1; MG/1; MG/1
TABLET ORAL
Qty: 90 TAB | Refills: 0 | OUTPATIENT
Start: 2018-11-20 | End: 2019-01-21 | Stop reason: SDUPTHER

## 2018-11-23 ENCOUNTER — TELEPHONE (OUTPATIENT)
Dept: INTERNAL MEDICINE CLINIC | Age: 63
End: 2018-11-23

## 2018-11-23 DIAGNOSIS — F90.9 ATTENTION DEFICIT HYPERACTIVITY DISORDER (ADHD), UNSPECIFIED ADHD TYPE: Primary | ICD-10-CM

## 2018-11-23 NOTE — TELEPHONE ENCOUNTER
See encounter from 11/19/18 patient called to check status and medication has been approved in the system please call patient to advise if medication is ready for pickup . Pt only has 2 tablets left

## 2018-11-26 ENCOUNTER — TELEPHONE (OUTPATIENT)
Dept: INTERNAL MEDICINE CLINIC | Age: 63
End: 2018-11-26

## 2018-11-26 RX ORDER — DEXTROAMPHETAMINE SACCHARATE, AMPHETAMINE ASPARTATE, DEXTROAMPHETAMINE SULFATE AND AMPHETAMINE SULFATE 2.5; 2.5; 2.5; 2.5 MG/1; MG/1; MG/1; MG/1
10 TABLET ORAL DAILY
Qty: 90 TAB | Refills: 0 | Status: SHIPPED | OUTPATIENT
Start: 2018-11-26 | End: 2019-01-21 | Stop reason: SDUPTHER

## 2018-11-26 NOTE — TELEPHONE ENCOUNTER
Spoke to [place] [employer] at [facility]. Writer called in the correct direction for the Adderall prescription. It should be patient to take 3 tab once daily.

## 2018-11-26 NOTE — TELEPHONE ENCOUNTER
Verbal order received from Izabella Montague NP to order   Adderall 10 mg #90 with 0 refills. Prescription was handed to the patient who was here to picked the prescription.

## 2018-11-30 ENCOUNTER — TELEPHONE (OUTPATIENT)
Dept: INTERNAL MEDICINE CLINIC | Age: 63
End: 2018-11-30

## 2018-11-30 RX ORDER — CEFDINIR 300 MG/1
300 CAPSULE ORAL 2 TIMES DAILY
Qty: 20 CAP | Refills: 0 | Status: SHIPPED | OUTPATIENT
Start: 2018-11-30 | End: 2018-12-10

## 2018-11-30 RX ORDER — PREDNISONE 20 MG/1
40 TABLET ORAL
Qty: 10 TAB | Refills: 0 | Status: SHIPPED | OUTPATIENT
Start: 2018-11-30 | End: 2019-01-28 | Stop reason: ALTCHOICE

## 2018-11-30 NOTE — TELEPHONE ENCOUNTER
Patient reports cough, headache, green sputum, chest congestion.  Requests to be treated    Hx of pneumonia, bronchitis,, tobacco use    Prednisone  and antibiotic sent to pharmacy

## 2019-01-14 ENCOUNTER — IP HISTORICAL/CONVERTED ENCOUNTER (OUTPATIENT)
Dept: OTHER | Age: 64
End: 2019-01-14

## 2019-01-19 DIAGNOSIS — J45.42 MODERATE PERSISTENT ASTHMA WITH STATUS ASTHMATICUS: ICD-10-CM

## 2019-01-21 DIAGNOSIS — F98.8 ATTENTION DEFICIT DISORDER (ADD) WITHOUT HYPERACTIVITY: ICD-10-CM

## 2019-01-21 RX ORDER — DEXTROAMPHETAMINE SACCHARATE, AMPHETAMINE ASPARTATE, DEXTROAMPHETAMINE SULFATE AND AMPHETAMINE SULFATE 2.5; 2.5; 2.5; 2.5 MG/1; MG/1; MG/1; MG/1
TABLET ORAL
Qty: 90 TAB | Refills: 0 | Status: SHIPPED | OUTPATIENT
Start: 2019-01-21 | End: 2019-01-28 | Stop reason: SDUPTHER

## 2019-01-21 RX ORDER — MONTELUKAST SODIUM 10 MG/1
TABLET ORAL
Qty: 90 TAB | Refills: 0 | Status: SHIPPED | OUTPATIENT
Start: 2019-01-21 | End: 2019-05-22 | Stop reason: SDUPTHER

## 2019-01-21 NOTE — TELEPHONE ENCOUNTER
Medication refill request:    Last Office Visit:  9/6/18  Next Office Visit:  No future appointments. Pharmacy verified.  Yes  Pt states she is completely out and didn't call sooner because she was in the hospital. She would like a call back once filled   [phone]

## 2019-01-22 ENCOUNTER — TELEPHONE (OUTPATIENT)
Dept: INTERNAL MEDICINE CLINIC | Age: 64
End: 2019-01-22

## 2019-01-22 DIAGNOSIS — B37.31 VAGINAL YEAST INFECTION: Primary | ICD-10-CM

## 2019-01-22 RX ORDER — FLUCONAZOLE 150 MG/1
150 TABLET ORAL ONCE
Qty: 2 TAB | Refills: 0 | Status: SHIPPED | OUTPATIENT
Start: 2019-01-22 | End: 2019-01-22

## 2019-01-28 ENCOUNTER — OFFICE VISIT (OUTPATIENT)
Dept: INTERNAL MEDICINE CLINIC | Age: 64
End: 2019-01-28

## 2019-01-28 VITALS
TEMPERATURE: 97.6 F | OXYGEN SATURATION: 99 % | DIASTOLIC BLOOD PRESSURE: 73 MMHG | HEART RATE: 90 BPM | SYSTOLIC BLOOD PRESSURE: 122 MMHG | RESPIRATION RATE: 16 BRPM | HEIGHT: 62 IN | BODY MASS INDEX: 31.28 KG/M2 | WEIGHT: 170 LBS

## 2019-01-28 DIAGNOSIS — E78.2 MIXED HYPERLIPIDEMIA: ICD-10-CM

## 2019-01-28 DIAGNOSIS — Z79.01 CURRENT USE OF ANTICOAGULANT THERAPY: ICD-10-CM

## 2019-01-28 DIAGNOSIS — R82.90 MALODOROUS URINE: ICD-10-CM

## 2019-01-28 DIAGNOSIS — E08.9 DIABETES MELLITUS DUE TO UNDERLYING CONDITION WITHOUT COMPLICATION, WITHOUT LONG-TERM CURRENT USE OF INSULIN (HCC): Primary | ICD-10-CM

## 2019-01-28 DIAGNOSIS — I25.10 CORONARY ARTERY DISEASE INVOLVING NATIVE CORONARY ARTERY OF NATIVE HEART WITHOUT ANGINA PECTORIS: ICD-10-CM

## 2019-01-28 DIAGNOSIS — F98.8 ATTENTION DEFICIT DISORDER (ADD) WITHOUT HYPERACTIVITY: ICD-10-CM

## 2019-01-28 DIAGNOSIS — E11.9 DIET-CONTROLLED TYPE 2 DIABETES MELLITUS (HCC): ICD-10-CM

## 2019-01-28 DIAGNOSIS — I10 HTN (HYPERTENSION), BENIGN: ICD-10-CM

## 2019-01-28 LAB
ALBUMIN UR QL STRIP: NORMAL MG/L
BILIRUB UR QL STRIP: NEGATIVE
CREATININE, URINE POC: 300 MG/DL
GLUCOSE UR-MCNC: NEGATIVE MG/DL
HBA1C MFR BLD HPLC: 6.9 %
KETONES P FAST UR STRIP-MCNC: NEGATIVE MG/DL
MICROALBUMIN/CREAT RATIO POC: NORMAL MG/G
PH UR STRIP: 5.5 [PH] (ref 4.6–8)
PROT UR QL STRIP: NEGATIVE
SP GR UR STRIP: 1.02 (ref 1–1.03)
UA UROBILINOGEN AMB POC: NORMAL (ref 0.2–1)
URINALYSIS CLARITY POC: CLEAR
URINALYSIS COLOR POC: YELLOW
URINE BLOOD POC: NEGATIVE
URINE LEUKOCYTES POC: NEGATIVE
URINE NITRITES POC: NEGATIVE

## 2019-01-28 RX ORDER — LISINOPRIL 20 MG/1
20 TABLET ORAL DAILY
Qty: 90 TAB | Refills: 3 | Status: SHIPPED | OUTPATIENT
Start: 2019-01-28 | End: 2019-11-22

## 2019-01-28 RX ORDER — ATORVASTATIN CALCIUM 40 MG/1
40 TABLET, FILM COATED ORAL DAILY
Qty: 90 TAB | Refills: 3 | Status: SHIPPED | OUTPATIENT
Start: 2019-01-28

## 2019-01-28 RX ORDER — METFORMIN HYDROCHLORIDE 500 MG/1
500 TABLET, EXTENDED RELEASE ORAL
Qty: 90 TAB | Refills: 3 | Status: SHIPPED | OUTPATIENT
Start: 2019-01-28 | End: 2020-06-25 | Stop reason: SDUPTHER

## 2019-01-28 RX ORDER — DEXTROAMPHETAMINE SACCHARATE, AMPHETAMINE ASPARTATE, DEXTROAMPHETAMINE SULFATE AND AMPHETAMINE SULFATE 2.5; 2.5; 2.5; 2.5 MG/1; MG/1; MG/1; MG/1
TABLET ORAL
Qty: 90 TAB | Refills: 0 | Status: SHIPPED | OUTPATIENT
Start: 2019-01-28 | End: 2019-05-22 | Stop reason: SDUPTHER

## 2019-01-28 RX ORDER — HYDROCHLOROTHIAZIDE 25 MG/1
25 TABLET ORAL DAILY
Qty: 90 TAB | Refills: 3 | Status: SHIPPED | OUTPATIENT
Start: 2019-01-28 | End: 2019-11-22 | Stop reason: SDUPTHER

## 2019-01-28 NOTE — PATIENT INSTRUCTIONS
Learning About Type 2 Diabetes  What is type 2 diabetes? Insulin is a hormone that helps your body use sugar from your food as energy. Type 2 diabetes happens when your body can't use insulin the right way. Over time, the pancreas can't make enough insulin. If you don't have enough insulin, too much sugar stays in your blood. If you are overweight, get little or no exercise, or have type 2 diabetes in your family, you are more likely to have problems with the way insulin works in your body.  Americans, Hispanics, Native Americans,  Americans, and Pacific Islanders have a higher risk for type 2 diabetes. Type 2 diabetes can be prevented or delayed with a healthy lifestyle, which includes staying at a healthy weight, making smart food choices, and getting regular exercise. What can you expect with type 2 diabetes? Kali Ward keep hearing about how important it is to keep your blood sugar within a target range. That's because over time, high blood sugar can lead to serious problems. It can:  · Harm your eyes, nerves, and kidneys. · Damage your blood vessels, leading to heart disease and stroke. · Reduce blood flow and cause nerve damage to parts of your body, especially your feet. This can cause slow healing and pain when you walk. · Make your immune system weak and less able to fight infections. When people hear the word \"diabetes,\" they often think of problems like these. But daily care and treatment can help prevent or delay these problems. The goal is to keep your blood sugar in a target range. That's the best way to reduce your chance of having more problems from diabetes. What are the symptoms? Some people who have type 2 diabetes may not have any symptoms early on. Many people with the disease don't even know they have it at first. But with time, diabetes starts to cause symptoms. You experience most symptoms of type 2 diabetes when your blood sugar is either too high or too low.   The most common symptoms of high blood sugar include:  · Thirst.  · Frequent urination. · Weight loss. · Blurry vision. The symptoms of low blood sugar include:  · Sweating. · Shakiness. · Weakness. · Hunger. · Confusion. How can you prevent type 2 diabetes? The best way to prevent or delay type 2 diabetes is to adopt healthy habits, which include:  · Staying at a healthy weight. · Exercising regularly. · Eating healthy foods. How is type 2 diabetes treated? If you have type 2 diabetes, here are the most important things you can do. · Take your diabetes medicines. · Check your blood sugar as often as your doctor recommends. Also, get a hemoglobin A1c test at least every 6 months. · Try to eat a variety of foods and to spread carbohydrate throughout the day. Carbohydrate raises blood sugar higher and more quickly than any other nutrient does. Carbohydrate is found in sugar, breads and cereals, fruit, starchy vegetables such as potatoes and corn, and milk and yogurt. · Get at least 30 minutes of exercise on most days of the week. Walking is a good choice. You also may want to do other activities, such as running, swimming, cycling, or playing tennis or team sports. If your doctor says it's okay, do muscle-strengthening exercises at least 2 times a week. · See your doctor for checkups and tests on a regular schedule. · If you have high blood pressure or high cholesterol, take the medicines as prescribed by your doctor. · Do not smoke. Smoking can make health problems worse. This includes problems you might have with type 2 diabetes. If you need help quitting, talk to your doctor about stop-smoking programs and medicines. These can increase your chances of quitting for good. Follow-up care is a key part of your treatment and safety. Be sure to make and go to all appointments, and call your doctor if you are having problems.  It's also a good idea to know your test results and keep a list of the medicines you take. Where can you learn more? Go to http://ac-vianey.info/. Enter R078 in the search box to learn more about \"Learning About Type 2 Diabetes. \"  Current as of: July 25, 2018  Content Version: 11.9  © 6033-2998 [employer]. Care instructions adapted under license by [employer] (which disclaims liability or warranty for this information). If you have questions about a medical condition or this instruction, always ask your healthcare professional. [address] any warranty or liability for your use of this information. Learning About Type 2 Diabetes  What is type 2 diabetes? Insulin is a hormone that helps your body use sugar from your food as energy. Type 2 diabetes happens when your body can't use insulin the right way. Over time, the pancreas can't make enough insulin. If you don't have enough insulin, too much sugar stays in your blood. If you are overweight, get little or no exercise, or have type 2 diabetes in your family, you are more likely to have problems with the way insulin works in your body.  Americans, Hispanics, Native Americans,  Americans, and Pacific Islanders have a higher risk for type 2 diabetes. Type 2 diabetes can be prevented or delayed with a healthy lifestyle, which includes staying at a healthy weight, making smart food choices, and getting regular exercise. What can you expect with type 2 diabetes? Arin Nolen keep hearing about how important it is to keep your blood sugar within a target range. That's because over time, high blood sugar can lead to serious problems. It can:  · Harm your eyes, nerves, and kidneys. · Damage your blood vessels, leading to heart disease and stroke. · Reduce blood flow and cause nerve damage to parts of your body, especially your feet. This can cause slow healing and pain when you walk. · Make your immune system weak and less able to fight infections.   When people hear the word \"diabetes,\" they often think of problems like these. But daily care and treatment can help prevent or delay these problems. The goal is to keep your blood sugar in a target range. That's the best way to reduce your chance of having more problems from diabetes. What are the symptoms? Some people who have type 2 diabetes may not have any symptoms early on. Many people with the disease don't even know they have it at first. But with time, diabetes starts to cause symptoms. You experience most symptoms of type 2 diabetes when your blood sugar is either too high or too low. The most common symptoms of high blood sugar include:  · Thirst.  · Frequent urination. · Weight loss. · Blurry vision. The symptoms of low blood sugar include:  · Sweating. · Shakiness. · Weakness. · Hunger. · Confusion. How can you prevent type 2 diabetes? The best way to prevent or delay type 2 diabetes is to adopt healthy habits, which include:  · Staying at a healthy weight. · Exercising regularly. · Eating healthy foods. How is type 2 diabetes treated? If you have type 2 diabetes, here are the most important things you can do. · Take your diabetes medicines. · Check your blood sugar as often as your doctor recommends. Also, get a hemoglobin A1c test at least every 6 months. · Try to eat a variety of foods and to spread carbohydrate throughout the day. Carbohydrate raises blood sugar higher and more quickly than any other nutrient does. Carbohydrate is found in sugar, breads and cereals, fruit, starchy vegetables such as potatoes and corn, and milk and yogurt. · Get at least 30 minutes of exercise on most days of the week. Walking is a good choice. You also may want to do other activities, such as running, swimming, cycling, or playing tennis or team sports. If your doctor says it's okay, do muscle-strengthening exercises at least 2 times a week.   · See your doctor for checkups and tests on a regular schedule. · If you have high blood pressure or high cholesterol, take the medicines as prescribed by your doctor. · Do not smoke. Smoking can make health problems worse. This includes problems you might have with type 2 diabetes. If you need help quitting, talk to your doctor about stop-smoking programs and medicines. These can increase your chances of quitting for good. Follow-up care is a key part of your treatment and safety. Be sure to make and go to all appointments, and call your doctor if you are having problems. It's also a good idea to know your test results and keep a list of the medicines you take. Where can you learn more? Go to http://ac-vianey.info/. Enter L733 in the search box to learn more about \"Learning About Type 2 Diabetes. \"  Current as of: July 25, 2018  Content Version: 11.9  © 6643-2588 [employer]Topia. Care instructions adapted under license by [employer]ix (which disclaims liability or warranty for this information). If you have questions about a medical condition or this instruction, always ask your healthcare professional. [address] any warranty or liability for your use of this information. Learning About Diabetes and Coronary Artery Disease  How are diabetes and heart disease connected? Many people think diabetes and heart disease go hand in hand. But having diabetes doesn't have to mean that you are going to have a heart attack someday. Healthy living can help prevent many of the problems that come with both diabetes and heart disease. For some people, diabetes can cause problems in your body that may lead to heart disease. Diabetes can make the problems of heart disease worse. Experts do not fully understand how diabetes affects the heart. Many things can lead to heart disease, including high blood sugar, insulin resistance, high cholesterol, and high blood pressure.  But lifestyle and genetics may also affect a person's risk. But here's the good news: The good things you're doing to stay healthy with diabetes--eating healthy foods, quitting smoking, getting exercise and more--are also helping your heart. What increases your risk for heart disease? When you have diabetes, your risk for heart disease is even higher if you have:  · High blood pressure, which pushes blood through the arteries with too much force. Over time, this damages the walls of the arteries. · High cholesterol, which causes the buildup of a kind of fat inside the blood vessel walls. This buildup can lower blood flow to the heart muscle and raise your risk for having a heart attack. · Kidney damage, which shares many of the risk factors for heart disease (such as high blood sugar, high blood pressure, and high cholesterol). How can you keep your heart healthy when you have diabetes? Managing your diabetes and keeping your heart healthy are two sides of the same coin. Here are some things you can do. · Test your blood sugar levels and get your diabetes tests on schedule. Try to keep your numbers within your target range. · Keep track of your blood pressure. Your doctor will give you a goal that's right for you. If your blood pressure is high, your treatment may also include medicine. Changes in your lifestyle, such as staying at a healthy weight, may also help you lower your blood pressure. · Eat heart-healthy foods. These include fruits, vegetables, whole grains, fish, and low-fat or nonfat dairy foods. Limit sodium, alcohol, and sweets. · If your doctor recommends it, get more exercise. Walking is a good choice. Bit by bit, increase the amount you walk every day. Try for at least 30 minutes on most days of the week. · Do not smoke. Smoking can make diabetes and heart disease worse. If you need help quitting, talk to your doctor about stop-smoking programs and medicines. These can increase your chances of quitting for good.   · Your doctor may talk with you about taking medicines for your heart. For example, your doctor may suggest taking a statin or daily aspirin. Where can you learn more? Go to http://ac-vianey.info/. Enter N800 in the search box to learn more about \"Learning About Diabetes and Coronary Artery Disease. \"  Current as of: July 25, 2018  Content Version: 11.9  © 6542-4824 [employer]. Care instructions adapted under license by [employer] (which disclaims liability or warranty for this information). If you have questions about a medical condition or this instruction, always ask your healthcare professional. [place] [address] any warranty or liability for your use of this information. Learning About Coronary Artery Disease (CAD)  What is coronary artery disease? Coronary artery disease (CAD) occurs when plaque builds up in the arteries that bring oxygen-rich blood to your heart. Plaque is a fatty substance made of cholesterol, calcium, and other substances in the blood. This process is called hardening of the arteries, or atherosclerosis. What happens when you have coronary artery disease? · Plaque may narrow the coronary arteries. Narrowed arteries cause poor blood flow. This can lead to angina symptoms such as chest pain or discomfort. If blood flow is completely blocked, you could have a heart attack. · You can slow CAD and reduce the risk of future problems by making changes in your lifestyle. These include quitting smoking and eating heart-healthy foods. · Treatments for CAD, along with changes in your lifestyle, can help you live a longer and healthier life. How can you prevent coronary artery disease? · Do not smoke. It may be the best thing you can do to prevent heart disease. If you need help quitting, talk to your doctor about stop-smoking programs and medicines. These can increase your chances of quitting for good. · Be active.  Get at least 30 minutes of exercise on most days of the week. Walking is a good choice. You also may want to do other activities, such as running, swimming, cycling, or playing tennis or team sports. · Eat heart-healthy foods. Eat more fruits and vegetables and less foods that contain saturated and trans fats. Limit alcohol, sodium, and sweets. · Stay at a healthy weight. Lose weight if you need to. · Manage other health problems such as diabetes, high blood pressure, and high cholesterol. · Manage stress. Stress can hurt your heart. To keep stress low, talk about your problems and feelings. Don't keep your feelings hidden. · If you have talked about it with your doctor, take a low-dose aspirin every day. Aspirin can help certain people lower their risk of a heart attack or stroke. But taking aspirin isn't right for everyone, because it can cause serious bleeding. Do not start taking daily aspirin unless your doctor knows about it. How is coronary artery disease treated? · Your doctor will suggest that you make lifestyle changes. For example, your doctor may ask you to eat healthy foods, quit smoking, lose extra weight, and be more active. · You will have to take medicines. · Your doctor may suggest a procedure to open narrowed or blocked arteries. This is called angioplasty. Or your doctor may suggest using healthy blood vessels to create detours around narrowed or blocked arteries. This is called bypass surgery. Follow-up care is a key part of your treatment and safety. Be sure to make and go to all appointments, and call your doctor if you are having problems. It's also a good idea to know your test results and keep a list of the medicines you take. Where can you learn more? Go to http://ac-vianey.info/. Enter [phone] in the search box to learn more about \"Learning About Coronary Artery Disease (CAD). \"  Current as of: July 22, 2018  Content Version: 11.9  © 4877-1283 Healthwise, Incorporated. Care instructions adapted under license by Videofropper (which disclaims liability or warranty for this information). If you have questions about a medical condition or this instruction, always ask your healthcare professional. [address] any warranty or liability for your use of this information.

## 2019-01-28 NOTE — LETTER
NOTIFICATION RETURN TO WORK / SCHOOL 
 
1/28/2019 9:46 AM 
 
Ms. Jaylen Coronado [address] [facility] [id number] To Whom It May Concern: 
 
Jaylen Coronado is currently under the care of [facility]. She will return to work/school on: January 28, 2019 Please excuse absence from January 14, 2019 If there are questions or concerns please have the patient contact our office. Sincerely, Nikhil Ahn NP

## 2019-01-28 NOTE — PROGRESS NOTES
Exam room [address] [facility] Dwaine Gorman is a 70 y.o. female   Pt is here after being admitted for Ischemic Attack, and stint placed. Pt wants to go to work. Visit Vitals  /73 (BP 1 Location: Left arm, BP Patient Position: Sitting)   Pulse 90   Temp 97.6 °F (36.4 °C)   Resp 16   Ht 5' 2\" (1.575 m)   Wt 170 lb (77.1 kg)   SpO2 99%   BMI 31.09 kg/m²     Chief Complaint   Patient presents with   [address] [place] [address] Follow Up    Letter for School/Work     1. Have you been to the ER, urgent care clinic since your last visit? Hospitalized since your last visit? Yes, [facility]. [id number]- 1-17-19. Pt had heart attack and had stint placed, would like clearance to go back to work this week    2. Have you seen or consulted any other health care providers outside of the [address] [place] [address] since your last visit? Include any pap smears or colon screening.  No  Health Maintenance Due   Topic Date Due    Hepatitis C Screening  1955    DTaP/Tdap/Td series (1 - Tdap) 11/20/1974    Shingrix Vaccine Age 50> (1 of 2) 11/20/2003    FOBT Q 1 YEAR AGE 50-75  03/19/2016    FOOT EXAM Q1  03/02/2017    EYE EXAM RETINAL OR DILATED  03/19/2017    MICROALBUMIN Q1  02/08/2018    Influenza Age 9 to Adult  08/01/2018    GLAUCOMA SCREENING Q2Y  11/20/2018    Bone Densitometry (Dexa) Screening  11/20/2018    Pneumococcal 65+ Low/Medium Risk (1 of 2 - PCV13) 11/20/2018    BREAST CANCER SCRN MAMMOGRAM  02/03/2019     Learning Assessment 3/19/2015   PRIMARY LEARNER Patient   BARRIERS PRIMARY LEARNER NONE   PRIMARY LANGUAGE ENGLISH   LEARNER PREFERENCE PRIMARY DEMONSTRATION     VIDEOS     DEMONSTRATION   ANSWERED BY self   RELATIONSHIP SELF

## 2019-01-28 NOTE — PROGRESS NOTES
HISTORY OF PRESENT ILLNESS  Zenaida Berrios is a 70 y.o. female presents for routine visit   HPI  Admitted to [facility]   January 14-17. Reports she has a heart attack   Follow up with cardiology Feb 5,  [facility]    First week after discharge has SOB and palpitations, now resolved    Now on Brillanta BID and Metoprolol,  Atorvastatin restarted    Blood sugars high in hospital. Had just taken 60 mg daily Prednisone prescribed by  provider for asthma flare before hospital admission     Stopped smoking shortly before hospital admission     Diabetes controlled off medication  for > 1 year    Wants all scripts to go to [facility] past due    Works 20 hours a week at [employer]     And full time in [employer]     Mammogram past due    Estranged spouse  January 1      Review of Systems   Constitutional: Negative. HENT: Negative. Eyes: Negative. Respiratory: Negative. Cardiovascular: Negative for chest pain and palpitations. Gastrointestinal: Negative. Genitourinary: Negative. Musculoskeletal: Negative. Skin: Negative. Neurological: Negative for dizziness and headaches. Endo/Heme/Allergies: Does not bruise/bleed easily. Psychiatric/Behavioral: Negative. Visit Vitals  /73 (BP 1 Location: Left arm, BP Patient Position: Sitting)   Pulse 90   Temp 97.6 °F (36.4 °C)   Resp 16   Ht 5' 2\" (1.575 m)   Wt 170 lb (77.1 kg)   SpO2 99%   BMI 31.09 kg/m²     Physical Exam   Constitutional: She is oriented to person, place, and time. She appears well-developed and well-nourished. No distress. Eyes: Right eye exhibits no discharge. Left eye exhibits no discharge. Neck: Normal carotid pulses and no JVD present. Carotid bruit is not present. Cardiovascular: Normal rate, regular rhythm and normal heart sounds. Exam reveals no gallop and no friction rub. No murmur heard.   Pulmonary/Chest: Effort normal and breath sounds normal.   Musculoskeletal: She exhibits no edema, tenderness or deformity. Lymphadenopathy:     She has no cervical adenopathy. Neurological: She is alert and oriented to person, place, and time. No cranial nerve deficit. Skin: Skin is warm and dry. No bruising and no rash noted. She is not diaphoretic. No erythema. Psychiatric: Her behavior is normal. Judgment and thought content normal. Her mood appears anxious. Her speech is rapid and/or pressured. ASSESSMENT and PLAN    ICD-10-CM ICD-9-CM    1. Diabetes mellitus due to underlying condition without complication, without long-term current use of insulin (HCC) E08.9 249.00 AMB POC URINE, MICROALBUMIN, SEMIQUANT (3 RESULTS)      AMB POC HEMOGLOBIN A1C   2. HTN (hypertension), benign I10 401.1    3. Mixed hyperlipidemia E78.2 272.2 atorvastatin (LIPITOR) 40 mg tablet   4. Attention deficit disorder (ADD) without hyperactivity F98.8 314.00 dextroamphetamine-amphetamine (ADDERALL) 10 mg tablet   5. Diet-controlled type 2 diabetes mellitus (HCC) E11.9 250.00 AMB POC URINE, MICROALBUMIN, SEMIQUANT (3 RESULTS)   6. Coronary artery disease involving native coronary artery of native heart without angina pectoris I25.10 414.01    7. Current use of anticoagulant therapy Z79.01 V58.61    8. Malodorous urine R82.90 791.9 AMB POC URINALYSIS DIP STICK AUTO W/O MICRO     Follow-up Disposition:  Return in about 3 months (around 4/28/2019) for diabetes, htn.  lab results and schedule of future lab studies reviewed with patient  reviewed diet, exercise and weight control  cardiovascular risk and specific lipid/LDL goals reviewed  reviewed medications and side effects in detail  specific diabetic recommendations: low cholesterol diet, weight control and daily exercise discussed, home glucose monitoring emphasized, annual eye examinations at Ophthalmology discussed, glycohemoglobin and other lab monitoring discussed and long term diabetic complications discussed     Hemoglobin a1c 6.9%.  Restart Metformin, discussed lifestyle management    Hypertension controlled      CAD new diagnosis. Discussed importance of lifestyle changes, follow up with specialist, compliance with medical management    Patient voices understanding and acceptance of this advice and will call back if any further questions or concerns. An After Visit Summary was printed and given to the patient.

## 2019-02-18 RX ORDER — METOPROLOL SUCCINATE 25 MG/1
25 TABLET, EXTENDED RELEASE ORAL DAILY
Qty: 30 TAB | Refills: 0 | Status: SHIPPED | OUTPATIENT
Start: 2019-02-18 | End: 2019-11-22 | Stop reason: SDUPTHER

## 2019-02-18 NOTE — TELEPHONE ENCOUNTER
Left generic message to callback the office in reference to medication refill's. Please have pt call the pharmacy to send us the request's.

## 2019-02-18 NOTE — TELEPHONE ENCOUNTER
Patient called back stating that the prescriptions were prescribed from ER doctor. Per patient, she and NP Josephine Raymundo discussed the medication however, pt was not ready to get refills at that time.  Please advise

## 2019-02-18 NOTE — TELEPHONE ENCOUNTER
----- Message from Pati Crain sent at 2/18/2019 12:59 PM EST -----  Regarding: Marilin Fan NP/Refill  The patient is requesting that the NP calls in new prescriptions for Rx Brilinta 90mg for 60 tablets, Metoprolol [facility] 25mg into the pharmacy for a 30 day supply. The patient is also requesting that Rx Prednisone is called in for her asthma.  ([address] on file) ([phone]

## 2019-03-29 ENCOUNTER — DOCUMENTATION ONLY (OUTPATIENT)
Dept: INTERNAL MEDICINE CLINIC | Age: 64
End: 2019-03-29

## 2019-03-29 NOTE — PROGRESS NOTES
Key: [id number]    Mariella is processing your PA request and will respond shortly with next steps. You may close this dialog, return to your dashboard, and perform other tasks. To check for an update later, open this request again from your dashboard. If you need assistance, please chat with CoverMyMeds or call us at [phone].

## 2019-04-24 DIAGNOSIS — J45.40 MODERATE PERSISTENT ASTHMA WITHOUT COMPLICATION: ICD-10-CM

## 2019-04-26 RX ORDER — ALBUTEROL SULFATE 90 UG/1
AEROSOL, METERED RESPIRATORY (INHALATION)
Qty: 1 INHALER | Refills: 3 | Status: SHIPPED | OUTPATIENT
Start: 2019-04-26 | End: 2019-11-22 | Stop reason: SDUPTHER

## 2019-05-09 ENCOUNTER — DOCUMENTATION ONLY (OUTPATIENT)
Dept: INTERNAL MEDICINE CLINIC | Age: 64
End: 2019-05-09

## 2019-05-09 NOTE — PROGRESS NOTES
PA initiated again for escitalopram 20mg   Key: [id number] faxed to plan. Will result in 1-5 days.    Pt is getting med refilled by new pharmacy   [facility]

## 2019-05-22 ENCOUNTER — OFFICE VISIT (OUTPATIENT)
Dept: INTERNAL MEDICINE CLINIC | Age: 64
End: 2019-05-22

## 2019-05-22 VITALS
OXYGEN SATURATION: 97 % | DIASTOLIC BLOOD PRESSURE: 74 MMHG | HEART RATE: 66 BPM | RESPIRATION RATE: 14 BRPM | BODY MASS INDEX: 33.13 KG/M2 | HEIGHT: 62 IN | TEMPERATURE: 98 F | SYSTOLIC BLOOD PRESSURE: 120 MMHG | WEIGHT: 180 LBS

## 2019-05-22 DIAGNOSIS — I10 ESSENTIAL HYPERTENSION: ICD-10-CM

## 2019-05-22 DIAGNOSIS — J45.42 MODERATE PERSISTENT ASTHMA WITH STATUS ASTHMATICUS: ICD-10-CM

## 2019-05-22 DIAGNOSIS — J45.40 MODERATE PERSISTENT ASTHMA WITHOUT COMPLICATION: ICD-10-CM

## 2019-05-22 DIAGNOSIS — N31.9 BLADDER DYSFUNCTION: ICD-10-CM

## 2019-05-22 DIAGNOSIS — F98.8 ATTENTION DEFICIT DISORDER (ADD) WITHOUT HYPERACTIVITY: ICD-10-CM

## 2019-05-22 DIAGNOSIS — R09.89 DECREASED PEDAL PULSES: ICD-10-CM

## 2019-05-22 DIAGNOSIS — M79.605 PAIN IN BOTH LOWER EXTREMITIES: ICD-10-CM

## 2019-05-22 DIAGNOSIS — E08.9 DIABETES MELLITUS DUE TO UNDERLYING CONDITION WITHOUT COMPLICATION, WITHOUT LONG-TERM CURRENT USE OF INSULIN (HCC): ICD-10-CM

## 2019-05-22 DIAGNOSIS — R35.0 URINARY FREQUENCY: Primary | ICD-10-CM

## 2019-05-22 DIAGNOSIS — E78.2 MIXED HYPERLIPIDEMIA: ICD-10-CM

## 2019-05-22 DIAGNOSIS — Z79.01 CURRENT USE OF ANTICOAGULANT THERAPY: ICD-10-CM

## 2019-05-22 DIAGNOSIS — M79.604 PAIN IN BOTH LOWER EXTREMITIES: ICD-10-CM

## 2019-05-22 LAB
BILIRUB UR QL STRIP: NEGATIVE
GLUCOSE UR-MCNC: NEGATIVE MG/DL
KETONES P FAST UR STRIP-MCNC: NEGATIVE MG/DL
PH UR STRIP: 5.5 [PH] (ref 4.6–8)
PROT UR QL STRIP: NEGATIVE
SP GR UR STRIP: 1.02 (ref 1–1.03)
UA UROBILINOGEN AMB POC: NORMAL (ref 0.2–1)
URINALYSIS CLARITY POC: CLEAR
URINALYSIS COLOR POC: YELLOW
URINE BLOOD POC: NEGATIVE
URINE LEUKOCYTES POC: NORMAL
URINE NITRITES POC: NEGATIVE

## 2019-05-22 RX ORDER — CEFDINIR 300 MG/1
300 CAPSULE ORAL 2 TIMES DAILY
Qty: 20 CAP | Refills: 0 | Status: SHIPPED | OUTPATIENT
Start: 2019-05-22 | End: 2019-12-30 | Stop reason: SDUPTHER

## 2019-05-22 RX ORDER — DEXTROAMPHETAMINE SACCHARATE, AMPHETAMINE ASPARTATE, DEXTROAMPHETAMINE SULFATE AND AMPHETAMINE SULFATE 2.5; 2.5; 2.5; 2.5 MG/1; MG/1; MG/1; MG/1
TABLET ORAL
Qty: 90 TAB | Refills: 0 | Status: SHIPPED | OUTPATIENT
Start: 2019-05-22 | End: 2019-08-05 | Stop reason: SDUPTHER

## 2019-05-22 RX ORDER — PREDNISONE 20 MG/1
40 TABLET ORAL
Qty: 10 TAB | Refills: 0 | Status: SHIPPED | OUTPATIENT
Start: 2019-05-22 | End: 2019-12-30 | Stop reason: SDUPTHER

## 2019-05-22 RX ORDER — PRASUGREL 10 MG/1
TABLET, FILM COATED ORAL
Refills: 0 | COMMUNITY
Start: 2019-04-24

## 2019-05-22 RX ORDER — MONTELUKAST SODIUM 10 MG/1
TABLET ORAL
Qty: 90 TAB | Refills: 0 | Status: SHIPPED | OUTPATIENT
Start: 2019-05-22 | End: 2019-11-23 | Stop reason: SDUPTHER

## 2019-05-22 RX ORDER — EZETIMIBE 10 MG/1
TABLET ORAL
Refills: 0 | COMMUNITY
Start: 2019-04-24 | End: 2019-08-05 | Stop reason: SDUPTHER

## 2019-05-22 NOTE — PROGRESS NOTES
RM 7    Chief Complaint   Patient presents with    Asthma     reports green mucus when coughing.  Urinary Odor     occuring for a few weeks    Urgency     Patient reports voiding more often and unable to hold urine. Denies burning sensation.  Leg Pain     started when taking Pragrusel. 1. Have you been to the ER, urgent care clinic since your last visit? Hospitalized since your last visit? No    2. Have you seen or consulted any other health care providers outside of the [address] [place] [address] since your last visit? Include any pap smears or colon screening. Yes Reason for visit: Cardiologist, 4/24/19, OC spray approval     Health Maintenance Due   Topic Date Due    Hepatitis C Screening  1955    DTaP/Tdap/Td series (1 - Tdap) 11/20/1974    Shingrix Vaccine Age 50> (1 of 2) 11/20/2003    FOBT Q 1 YEAR AGE 50-75  03/19/2016    FOOT EXAM Q1  03/02/2017    EYE EXAM RETINAL OR DILATED  03/19/2017    GLAUCOMA SCREENING Q2Y  11/20/2018    Bone Densitometry (Dexa) Screening  11/20/2018    Pneumococcal 65+ years (1 of 2 - PCV13) 11/20/2018    BREAST CANCER SCRN MAMMOGRAM  02/03/2019     Abuse Screening Questionnaire 5/22/2019   Do you ever feel afraid of your partner? N   Are you in a relationship with someone who physically or mentally threatens you? N   Is it safe for you to go home?  Y     3 most recent PHQ Screens 5/22/2019   Little interest or pleasure in doing things Not at all   Feeling down, depressed, irritable, or hopeless Not at all   Total Score PHQ 2 0   Trouble falling or staying asleep, or sleeping too much -   Feeling tired or having little energy -   Poor appetite, weight loss, or overeating -   Feeling bad about yourself - or that you are a failure or have let yourself or your family down -   Trouble concentrating on things such as school, work, reading, or watching TV -   Moving or speaking so slowly that other people could have noticed; or the opposite being so fidgety that others notice -   Thoughts of being better off dead, or hurting yourself in some way -   PHQ 9 Score -   How difficult have these problems made it for you to do your work, take care of your home and get along with others -     Fall Risk Assessment, last 12 mths 5/22/2019   Able to walk? Yes   Fall in past 12 months?  No          Learning Assessment 5/22/2019   PRIMARY LEARNER Patient   BARRIERS PRIMARY LEARNER NONE   PRIMARY LANGUAGE ENGLISH   LEARNER PREFERENCE PRIMARY READING     -     -   ANSWERED BY patient   RELATIONSHIP SELF

## 2019-05-22 NOTE — PATIENT INSTRUCTIONS
Learning About Asthma Triggers What are asthma triggers? When you have asthma, certain things can make your symptoms worse. These are called triggers. Learn what triggers an asthma attack for you, and avoid the triggers when you can. Common triggers include colds, smoke, air pollution, dust, pollen, pets, stress, and cold air. How do asthma triggers affect you? Triggers can make it harder for your lungs to work as they should. They can lead to sudden breathing problems and other symptoms. When you are around a trigger, an asthma attack is more likely. If your symptoms are severe, you may need emergency treatment or have to go to the hospital for treatment. What can you do to avoid triggers? The first thing is to know your triggers. When you are having symptoms, note the things around you that might be causing them. Then look for patterns that may be triggering your symptoms. Record your triggers on a piece of paper or in an asthma diary. When you have your list of possible triggers, work with your doctor to find ways to avoid them. Avoid colds and flu. Get a pneumococcal vaccine shot. If you have had one before, ask your doctor whether you need a second dose. Get a flu vaccine every year, as soon as it's available. If you must be around people with colds or the flu, wash your hands often. Here are some ways to avoid a few common triggers. · Do not smoke or allow others to smoke around you. If you need help quitting, talk to your doctor about stop-smoking programs and medicines. These can increase your chances of quitting for good. · If there is a lot of pollution, pollen, or dust outside, stay at home and keep your windows closed. Use an air conditioner or air filter in your home. Check your local weather report or newspaper for air quality and pollen reports. What else should you know? · Take your controller medicine every day, not just when you have symptoms.  It helps prevent problems before they occur. · Your doctor may suggest that you check how well your lungs are working by measuring your peak expiratory flow (PEF) throughout the day. Your PEF may drop when you are near things that trigger symptoms. Where can you learn more? Go to http://ac-vianey.info/. Enter A986 in the search box to learn more about \"Learning About Asthma Triggers. \" Current as of: September 5, 2018 Content Version: 11.9 © 2006-2018 [employer]. Care instructions adapted under license by [employer] (which disclaims liability or warranty for this information). If you have questions about a medical condition or this instruction, always ask your healthcare professional. [address] any warranty or liability for your use of this information. Learning About Tests When You Have Diabetes Why do you need regular diabetes tests? Diabetes can be hard on your body if it's not well controlled. But having tests on a regular schedule can help you and your doctor find problems early, when it's easier to start managing them. What tests do you need? The tests you may have, how often you should have them, and the goals of the tests are: 
A1c blood test. This test shows the average level of blood sugar over the past 2 to 3 months. It helps your doctor see whether blood sugar levels have been staying within your target range. · How often: Every 3 to 6 months · Goal: A blood sugar level in your target range Blood pressure test: This test measures the pressure of blood flow in the arteries. Controlling blood pressure can help prevent damage to nerves and blood vessels. · How often: Every 3 to 6 months · Goal: A blood pressure level in your target range Cholesterol test: This test measures the amount of a type of fat in the blood. It is common for people with diabetes to also have high cholesterol.  Too much cholesterol in the blood can build up inside the blood vessels and raise the risk for heart attack and stroke. · How often: At the time of your diabetes diagnosis, and as often as your doctor recommends after that · Goal: A cholesterol level in your target range Albumin-creatinine ratio test: This test checks for kidney damage by looking for the protein albumin (say \"al-BYOO-elva\") in the urine. Albumin is normally found in the blood. Kidney damage can let small amounts of it (microalbumin) leak into the urine. · How often: Once a year · Goal: No protein in the urine Blood creatinine test/estimated glomerular filtration (eGFR): The blood creatinine (say \"eevh-TN-gu-neen\") level shows how well your kidneys are working. Creatinine is a waste product that muscles release into the blood. Blood creatinine is used to estimate the glomerular filtration rate. A high level of creatinine and/or a low eGFR may mean your kidneys are not working as well as they should. · How often: Once a year · Goal: Normal level of creatinine in the blood. The eGFR goal is greater than 60 mL/min/1.73 m². Complete foot exam: The doctor checks for foot sores and whether any sensation has been lost. 
· How often: Once a year · Goal: Healthy feet with no foot ulcers or loss of feeling Dental exam and cleaning: The dentist checks for gum disease and tooth decay. People with high blood sugar are more likely to have these problems. · How often: Every 6 months · Goal: Healthy teeth and gums Complete eye exam: High blood sugar levels can damage the eyes. This exam is done by an ophthalmologist or optometrist. It includes a dilated eye exam. The exam shows whether there's damage to the back of the eye (diabetic retinopathy). · How often: Once a year. If you don't have any signs of diabetic retinopathy, your doctor may recommend an exam every 2 years. · Goal: No damage to the back of the eye Thyroid-stimulating hormone (TSH) blood test: This test checks for thyroid disease.  Too little thyroid hormone can cause some medicines (like insulin) to stay in the body longer. This can cause low blood sugar. You may be tested if you have high cholesterol or are a woman over 48years old. · How often: As part of your diabetes diagnosis, and as often as your doctor recommends after that · Goal: Normal level of TSH in the blood Follow-up care is a key part of your treatment and safety. Be sure to make and go to all appointments, and call your doctor if you are having problems. It's also a good idea to know your test results and keep a list of the medicines you take. Where can you learn more? Go to http://acThename.isvianey.info/. Enter 01.14.46.38.08 in the search box to learn more about \"Learning About Tests When You Have Diabetes. \" Current as of: July 25, 2018 Content Version: 11.9 © 3233-5096 [employer]. Care instructions adapted under license by [employer] (which disclaims liability or warranty for this information). If you have questions about a medical condition or this instruction, always ask your healthcare professional. [address] any warranty or liability for your use of this information. Diabetes Sick-Day Plan: Care Instructions Your Care Instructions If you have diabetes, many other illnesses can make your blood sugar go up. This can be dangerous. When you are sick with the flu or another illness, your body releases hormones to fight infection. These hormones raise blood sugar levels. They also make it hard for insulin or other medicines to lower your blood sugar. Work with your doctor to make a plan for what to do on days when you are sick. Follow-up care is a key part of your treatment and safety. Be sure to make and go to all appointments, and call your doctor if you are having problems. It's also a good idea to know your test results and keep a list of the medicines you take. How can you care for yourself at home?  
· Work with your doctor to write up a sick-day plan for what to do on days when you are sick. Your blood sugar can go up or down, depending on your illness and whether you can keep food down. Call your doctor when you are sick. Ask if you need to adjust your pills or insulin. · Write down the diabetes medicines you have been taking and whether you have changed the dose based on your sick-day plan. Have this information ready when you call your doctor. · Eat your normal types and amounts of food. Drink extra fluids, such as water, broth, and fruit juice, to prevent dehydration. ? If your blood sugar level is higher than the blood sugar level your doctor recommends (for example, above 240 milligrams per deciliter [mg/dL]), drink extra liquids that do not contain sugar. Examples are water and sugar-free cola. ? If you can't eat your usual foods, drink extra liquids, such as soup, sports drinks, or milk. You may also eat food that is gentle on the stomach. These foods include crackers, gelatin dessert, and applesauce. Try to eat or drink 50 grams of carbohydrates every 3 to 4 hours. For example, 6 saltine crackers, 1 cup (8 ounces) of milk, and ½ cup (4 ounces) of orange juice each contain about 15 grams of carbohydrate. · Check your blood sugar at least every 3 to 4 hours. If it goes up fast, check it more often. And check it even through the night. Take insulin if your doctor told you to do so. If you and your doctor did not have a sick-day plan for taking extra insulin, call him or her for advice. · If you take insulin, check your urine or blood for ketones. This is even more important if your blood sugar is high. · Do not take any over-the-counter medicines, such as pain relievers, decongestants, or herbal products or other natural medicines, without talking with your doctor first. 
· Do not drive. If you need to see your doctor or go anywhere else, ask a family member or friend to drive you.  
When should you call for help? Call 911 anytime you think you may need emergency care. For example, call if: 
  · You passed out (lost consciousness).  
  · You are confused or cannot think clearly.  
  · Your blood sugar is very high or very low.  
 Watch closely for changes in your health, and be sure to contact your doctor if: 
  · Your blood sugar stays outside the level your doctor set for you.  
  · You have any problems. Where can you learn more? Go to http://ac-vianey.info/. Enter B880 in the search box to learn more about \"Diabetes Sick-Day Plan: Care Instructions. \" Current as of: July 25, 2018 Content Version: 11.9 © 9846-6001 [employer]. Care instructions adapted under license by [employer] (which disclaims liability or warranty for this information). If you have questions about a medical condition or this instruction, always ask your healthcare professional. [address] any warranty or liability for your use of this information.

## 2019-05-22 NOTE — LETTER
NOTIFICATION RETURN TO WORK / SCHOOL 
 
5/22/2019 5:15 PM 
 
Ms. Devin Blevins [address] [facility] [phone] To Whom It May Concern: 
 
Devin Blevins is currently under the care of [facility]. She will return to work/school on: May 24, 2019 Patient was seen in office May 22, 2019 If there are questions or concerns please have the patient contact our office. Sincerely, Tuyet Pollack NP

## 2019-05-22 NOTE — LETTER
5/22/2019 5:17 PM 
 
Ms. Cuenca Joshua [address] [facility] [phone] To Whom It May Concern: This is to certify that the above patient was seen in our office on May 22, 2019 for a preventive physical exam  
 
 
 
 
Sincerely, Sha Swanson NP

## 2019-05-24 LAB
ALBUMIN SERPL-MCNC: 4.4 G/DL (ref 3.6–4.8)
ALBUMIN/GLOB SERPL: 1.9 {RATIO} (ref 1.2–2.2)
ALP SERPL-CCNC: 106 IU/L (ref 39–117)
ALT SERPL-CCNC: 30 IU/L (ref 0–32)
AST SERPL-CCNC: 18 IU/L (ref 0–40)
BACTERIA UR CULT: NORMAL
BASOPHILS # BLD AUTO: 0 X10E3/UL (ref 0–0.2)
BASOPHILS NFR BLD AUTO: 0 %
BILIRUB SERPL-MCNC: <0.2 MG/DL (ref 0–1.2)
BUN SERPL-MCNC: 13 MG/DL (ref 8–27)
BUN/CREAT SERPL: 19 (ref 12–28)
CALCIUM SERPL-MCNC: 9.9 MG/DL (ref 8.7–10.3)
CHLORIDE SERPL-SCNC: 103 MMOL/L (ref 96–106)
CHOLEST SERPL-MCNC: 310 MG/DL (ref 100–199)
CK SERPL-CCNC: 87 U/L (ref 24–173)
CO2 SERPL-SCNC: 24 MMOL/L (ref 20–29)
COMMENT, 011824: ABNORMAL
CREAT SERPL-MCNC: 0.69 MG/DL (ref 0.57–1)
EOSINOPHIL # BLD AUTO: 0.1 X10E3/UL (ref 0–0.4)
EOSINOPHIL NFR BLD AUTO: 1 %
ERYTHROCYTE [DISTWIDTH] IN BLOOD BY AUTOMATED COUNT: 13.7 % (ref 12.3–15.4)
ERYTHROCYTE [SEDIMENTATION RATE] IN BLOOD BY WESTERGREN METHOD: 2 MM/HR (ref 0–40)
EST. AVERAGE GLUCOSE BLD GHB EST-MCNC: 148 MG/DL
GLOBULIN SER CALC-MCNC: 2.3 G/DL (ref 1.5–4.5)
GLUCOSE SERPL-MCNC: 101 MG/DL (ref 65–99)
HBA1C MFR BLD: 6.8 % (ref 4.8–5.6)
HCT VFR BLD AUTO: 37.4 % (ref 34–46.6)
HDLC SERPL-MCNC: 44 MG/DL
HGB BLD-MCNC: 12.5 G/DL (ref 11.1–15.9)
IMM GRANULOCYTES # BLD AUTO: 0.1 X10E3/UL (ref 0–0.1)
IMM GRANULOCYTES NFR BLD AUTO: 1 %
LDLC SERPL CALC-MCNC: 198 MG/DL (ref 0–99)
LYMPHOCYTES # BLD AUTO: 2.3 X10E3/UL (ref 0.7–3.1)
LYMPHOCYTES NFR BLD AUTO: 21 %
MCH RBC QN AUTO: 31.2 PG (ref 26.6–33)
MCHC RBC AUTO-ENTMCNC: 33.4 G/DL (ref 31.5–35.7)
MCV RBC AUTO: 93 FL (ref 79–97)
MONOCYTES # BLD AUTO: 0.7 X10E3/UL (ref 0.1–0.9)
MONOCYTES NFR BLD AUTO: 6 %
NEUTROPHILS # BLD AUTO: 8.1 X10E3/UL (ref 1.4–7)
NEUTROPHILS NFR BLD AUTO: 71 %
PLATELET # BLD AUTO: 367 X10E3/UL (ref 150–450)
POTASSIUM SERPL-SCNC: 4.5 MMOL/L (ref 3.5–5.2)
PROT SERPL-MCNC: 6.7 G/DL (ref 6–8.5)
RBC # BLD AUTO: 4.01 X10E6/UL (ref 3.77–5.28)
SODIUM SERPL-SCNC: 140 MMOL/L (ref 134–144)
TRIGL SERPL-MCNC: 340 MG/DL (ref 0–149)
TSH SERPL DL<=0.005 MIU/L-ACNC: 1.61 UIU/ML (ref 0.45–4.5)
VLDLC SERPL CALC-MCNC: 68 MG/DL (ref 5–40)
WBC # BLD AUTO: 11.3 X10E3/UL (ref 3.4–10.8)

## 2019-05-24 NOTE — PROGRESS NOTES
HISTORY OF PRESENT ILLNESS  Reddy Riding is a 70 y.o. female presents with multiple medical problems   HPI  Bilateral leg pain from knee to ankles when she walks, symptoms present since April 25 when cardiologist  Stated her on  Prasugrel; Ticagrelor d/c due to high cost  She  discontinued statin 1 week ago, with no significant improvement. 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 Has had increased asthma symptoms since   Cough productive of green sputum, chest and nasal congestion     Requests medication refill     Past Medical History:   Diagnosis Date    ADD (attention deficit disorder)     Anxiety and depression     DDD (degenerative disc disease), lumbar     dr Talia Ward    Diet-controlled type 2 diabetes mellitus ([facility] [address]     Epicondylitis, lateral     Fibromyalgia     Hx of diabetes mellitus     Hyperlipidemia LDL goal < 100     Hypertension     OA (osteoarthritis)     Rotator cuff rupture     Tobacco abuse        Current Outpatient Medications   Medication Sig    prasugrel (EFFIENT) 10 mg tablet take 1 tablet by mouth once daily    dextroamphetamine-amphetamine (ADDERALL) 10 mg tablet take 3 tablets by mouth once daily    ezetimibe (ZETIA) 10 mg tablet take 1 tablet by mouth once daily    montelukast (SINGULAIR) 10 mg tablet TAKE 1 TABLET BY MOUTH DAILY    predniSONE (DELTASONE) 20 mg tablet Take 40 mg by mouth daily (with breakfast) for 5 days.  cefdinir (OMNICEF) 300 mg capsule Take 1 Cap by mouth two (2) times a day for 10 days.  PROAIR HFA 90 mcg/actuation inhaler inhale 2 puffs by mouth every 4 hours if needed wheezing    metoprolol succinate (TOPROL-XL) 25 mg XL tablet Take 1 Tab by mouth daily.  atorvastatin (LIPITOR) 40 mg tablet Take 1 Tab by mouth daily.  lisinopril (PRINIVIL, ZESTRIL) 20 mg tablet Take 1 Tab by mouth daily.  (Patient taking differently: Take 40 mg by mouth daily.)    hydroCHLOROthiazide (HYDRODIURIL) 25 mg tablet Take 1 Tab by mouth daily.  metFORMIN ER (GLUCOPHAGE XR) 500 mg tablet Take 1 Tab by mouth daily (with dinner).  albuterol (PROVENTIL HFA, VENTOLIN HFA, PROAIR HFA) 90 mcg/actuation inhaler Take 2 Puffs by inhalation every four (4) hours as needed for Wheezing.  beclomethasone dipropionate (QVAR REDIHALER) 40 mcg/actuation HFAb inhaler Take 1 Puff by inhalation two (2) times a day.  escitalopram oxalate (LEXAPRO) 20 mg tablet Take 2 Tabs by mouth daily. Indications: Generalized Anxiety Disorder, patient states she takes 2 20 mg tabs daily     No current facility-administered medications for this visit. Allergies   Allergen Reactions    Codeine Other (comments)     Severe headaches    Erythromycin Rash    Morphine Hives     No longer an allergy per patient     Neomycin Rash     Review of Systems   Constitutional: Negative for chills and fever. HENT: Positive for congestion and sore throat. Eyes: Negative. Respiratory: Positive for cough and shortness of breath. Cardiovascular: Positive for claudication. Negative for chest pain, orthopnea, leg swelling and PND. Gastrointestinal: Negative. Genitourinary: Positive for frequency and urgency. Negative for dysuria, flank pain and hematuria. Skin: Negative. Neurological: Negative for dizziness and headaches. Endo/Heme/Allergies: Does not bruise/bleed easily. Visit Vitals  /74 (BP 1 Location: Left arm, BP Patient Position: Sitting)   Pulse 66   Temp 98 °F (36.7 °C) (Oral)   Resp 14   Ht 5' 2\" (1.575 m)   Wt 180 lb (81.6 kg)   SpO2 97%   BMI 32.92 kg/m²     Physical Exam   Constitutional: She is oriented to person, place, and time. She appears well-developed and well-nourished. No distress.    HENT:   Right Ear: External ear normal.   Left Ear: External ear normal.   Nose: Nose normal.   Mouth/Throat: Oropharynx is clear and moist. No oropharyngeal exudate. Eyes: Conjunctivae are normal. Right eye exhibits no discharge. Left eye exhibits no discharge. Cardiovascular: Normal rate, regular rhythm and normal heart sounds. Pulmonary/Chest: Effort normal and breath sounds normal.   Musculoskeletal: She exhibits no edema, tenderness or deformity. Lymphadenopathy:     She has no cervical adenopathy. Neurological: She is alert and oriented to person, place, and time. No cranial nerve deficit. Skin: Skin is warm and dry. No rash noted. She is not diaphoretic. No erythema. Psychiatric: Thought content normal. Her mood appears anxious. Her speech is rapid and/or pressured and tangential. Cognition and memory are normal.     Diabetic foot exam:     Left Foot:   Visual Exam: normal    Pulse DP: absent   Filament test:          Right Foot:   Visual Exam: normal    Pulse DP: absent   Filament test:        ASSESSMENT and PLAN    ICD-10-CM ICD-9-CM    1. Urinary frequency R35.0 788.41 AMB POC URINALYSIS DIP STICK AUTO W/O MICRO      CULTURE, URINE   2. Attention deficit disorder (ADD) without hyperactivity F98.8 314.00 dextroamphetamine-amphetamine (ADDERALL) 10 mg tablet   3. Moderate persistent asthma with status asthmaticus J45.42 493.91 montelukast (SINGULAIR) 10 mg tablet      predniSONE (DELTASONE) 20 mg tablet      cefdinir (OMNICEF) 300 mg capsule   4. Pain in both lower extremities M79.604 729.5 CBC WITH AUTOMATED DIFF    Q70.735  METABOLIC PANEL, COMPREHENSIVE      SED RATE (ESR)      CK      ANKLE BRACHIAL INDEX      HEMOGLOBIN A1C WITH EAG      TSH RFX ON ABNORMAL TO FREE T4   5. Bladder dysfunction N31.9 596.59 REFERRAL TO UROLOGY   6. Decreased pedal pulses R09.89 785.9    7. Mixed hyperlipidemia E78.2 272.2 LIPID PANEL      CANCELED: LIPID PANEL   8. Moderate persistent asthma without complication H51.21 332.19    9.  Essential hypertension F37 385.0 METABOLIC PANEL, COMPREHENSIVE   10. Current use of anticoagulant therapy Z79.01 V58.61 CBC WITH AUTOMATED DIFF   11. Diabetes mellitus due to underlying condition without complication, without long-term current use of insulin ([address] E08.9 249.00 HEMOGLOBIN A1C WITH EAG     Follow-up and Dispositions    · Return in about 6 months (around 11/22/2019), or if symptoms worsen or fail to improve, for diabetes, htn, hyperlipidemia. Urinalysis; trace leukocytes    Discussed likely bladder dysfunction; Will do fasting labs at [facility]       encouraged urology evaluation   lab results and schedule of future lab studies reviewed with patient  reviewed medications and side effects in detail    . Patient voices understanding and acceptance of this advice and will call back if any further questions or concerns. An After Visit Summary was printed and given to the patient.

## 2019-08-05 DIAGNOSIS — F98.8 ATTENTION DEFICIT DISORDER (ADD) WITHOUT HYPERACTIVITY: ICD-10-CM

## 2019-08-05 RX ORDER — DEXTROAMPHETAMINE SACCHARATE, AMPHETAMINE ASPARTATE, DEXTROAMPHETAMINE SULFATE AND AMPHETAMINE SULFATE 2.5; 2.5; 2.5; 2.5 MG/1; MG/1; MG/1; MG/1
TABLET ORAL
Qty: 90 TAB | Refills: 0 | Status: SHIPPED | OUTPATIENT
Start: 2019-08-05 | End: 2019-11-22 | Stop reason: SDUPTHER

## 2019-08-05 RX ORDER — EZETIMIBE 10 MG/1
10 TABLET ORAL DAILY
Qty: 90 TAB | Refills: 0 | Status: SHIPPED | OUTPATIENT
Start: 2019-08-05 | End: 2019-11-22 | Stop reason: SDUPTHER

## 2019-09-16 ENCOUNTER — TELEPHONE (OUTPATIENT)
Dept: INTERNAL MEDICINE CLINIC | Age: 64
End: 2019-09-16

## 2019-09-16 NOTE — TELEPHONE ENCOUNTER
Pt having congestion, white / yellow mucous. Pt is taking Mucinex, Nyquil and her asthma medication. Pt states she has a 'sinus infection and a chest infection' Pt states NP Ling José has treated her for this in the past.  Informed pt she needed to schedule an appt in order for provider to Rx an antibiotic. Offered pt same day appt (3:30p) w/ a different provider, pt declined, stating she had to 'work tonight and tomorrow'. Pt insisted writer send a message to BRANDON José for Rx request. Pt uses [facility] on file.   Pt ph [phone]

## 2019-09-17 NOTE — TELEPHONE ENCOUNTER
----- Message from Audrey Del Toro sent at 9/17/2019  2:04 PM EDT -----  Regarding: NP [place]/Refill  General Message/Vendor Calls    Caller's first and last name: Talia Samson      Reason for call:Rx for head and chest cold      Callback required yes/no and why:yes      Best contact number(s):[phone]      Details to clarify the request: Pt stated she called on yesterday regarding a Rx for Prednisone and antibiotic to be called to [facility] pharmacy at the [facility] for a head and chest cold, and have received no response. Pt requested a call today to let her know if Rxs will be called in.       Audrey Del Toro

## 2019-09-17 NOTE — TELEPHONE ENCOUNTER
Left patient a voicemail stating to call the office back to schedule an appointment to be evaluated. Patient not seen since 5/22/19. Medication will not be sent to pharmacy without proper evaluation.

## 2019-09-18 NOTE — TELEPHONE ENCOUNTER
I talked to patient and notified her of Mrs. Hernandez's note. She stated \" I am not coming in there that is a Pediatric office and kids are seen there too. This thing is running through my company that I have. I don't feel like driving there to be seen\"    I notified patient that if she is not seen here then Mrs. Malena Peck recommends urgent care for her to be evaluated. Patient stated again \"I am not coming there, I will take care of this on my own and thank you for calling\" she then disconnected the call.

## 2019-11-22 ENCOUNTER — OFFICE VISIT (OUTPATIENT)
Dept: INTERNAL MEDICINE CLINIC | Age: 64
End: 2019-11-22

## 2019-11-22 VITALS
DIASTOLIC BLOOD PRESSURE: 91 MMHG | HEART RATE: 82 BPM | TEMPERATURE: 98.5 F | SYSTOLIC BLOOD PRESSURE: 160 MMHG | RESPIRATION RATE: 15 BRPM | HEIGHT: 62 IN | WEIGHT: 184 LBS | BODY MASS INDEX: 33.86 KG/M2 | OXYGEN SATURATION: 96 %

## 2019-11-22 DIAGNOSIS — I10 ESSENTIAL HYPERTENSION: ICD-10-CM

## 2019-11-22 DIAGNOSIS — R60.0 BILATERAL LEG EDEMA: ICD-10-CM

## 2019-11-22 DIAGNOSIS — F98.8 ATTENTION DEFICIT DISORDER (ADD) WITHOUT HYPERACTIVITY: ICD-10-CM

## 2019-11-22 DIAGNOSIS — J30.89 SEASONAL ALLERGIC RHINITIS DUE TO OTHER ALLERGIC TRIGGER: ICD-10-CM

## 2019-11-22 DIAGNOSIS — J45.42 MODERATE PERSISTENT ASTHMA WITH STATUS ASTHMATICUS: ICD-10-CM

## 2019-11-22 DIAGNOSIS — J45.40 MODERATE PERSISTENT ASTHMA WITHOUT COMPLICATION: Primary | ICD-10-CM

## 2019-11-22 RX ORDER — DEXTROAMPHETAMINE SACCHARATE, AMPHETAMINE ASPARTATE, DEXTROAMPHETAMINE SULFATE AND AMPHETAMINE SULFATE 2.5; 2.5; 2.5; 2.5 MG/1; MG/1; MG/1; MG/1
TABLET ORAL
Qty: 90 TAB | Refills: 0 | Status: SHIPPED | OUTPATIENT
Start: 2019-11-22 | End: 2020-05-15 | Stop reason: SDUPTHER

## 2019-11-22 RX ORDER — AZELASTINE 1 MG/ML
2 SPRAY, METERED NASAL
Qty: 1 BOTTLE | Refills: 5 | Status: SHIPPED | OUTPATIENT
Start: 2019-11-22 | End: 2019-11-22 | Stop reason: SDUPTHER

## 2019-11-22 RX ORDER — HYDROCHLOROTHIAZIDE 25 MG/1
12.5 TABLET ORAL
Qty: 90 TAB | Refills: 0
Start: 2019-11-22 | End: 2019-12-30 | Stop reason: SDUPTHER

## 2019-11-22 RX ORDER — CETIRIZINE HCL 10 MG
10 TABLET ORAL DAILY
Qty: 90 TAB | Refills: 1 | Status: SHIPPED | OUTPATIENT
Start: 2019-11-22

## 2019-11-22 RX ORDER — ALBUTEROL SULFATE 90 UG/1
2 AEROSOL, METERED RESPIRATORY (INHALATION)
Qty: 1 INHALER | Refills: 2 | Status: SHIPPED | OUTPATIENT
Start: 2019-11-22 | End: 2020-04-30

## 2019-11-22 RX ORDER — ALBUTEROL SULFATE 90 UG/1
AEROSOL, METERED RESPIRATORY (INHALATION)
Qty: 1 INHALER | Refills: 3 | Status: CANCELLED | OUTPATIENT
Start: 2019-11-22

## 2019-11-22 RX ORDER — LISINOPRIL 20 MG/1
40 TABLET ORAL DAILY
Status: CANCELLED | OUTPATIENT
Start: 2019-11-22

## 2019-11-22 RX ORDER — AZELASTINE 1 MG/ML
2 SPRAY, METERED NASAL
Qty: 1 BOTTLE | Refills: 5 | Status: SHIPPED | OUTPATIENT
Start: 2019-11-22 | End: 2022-01-04

## 2019-11-22 RX ORDER — METOPROLOL SUCCINATE 50 MG/1
50 TABLET, EXTENDED RELEASE ORAL DAILY
Qty: 90 TAB | Refills: 1 | Status: SHIPPED | OUTPATIENT
Start: 2019-11-22 | End: 2021-03-30 | Stop reason: ALTCHOICE

## 2019-11-22 RX ORDER — EZETIMIBE 10 MG/1
10 TABLET ORAL DAILY
Qty: 90 TAB | Refills: 1 | Status: SHIPPED | OUTPATIENT
Start: 2019-11-22

## 2019-11-22 NOTE — PATIENT INSTRUCTIONS
1.  You will be taking a single 50mg XL metoprolol instead of your prior 25mg metoprolol. 2.  You will be taking a single inhalation 2 times daily of an 80mcg Qvar inhaler instead of the prior 40mcg inhaler. 3.  Please bring you home BP cuff so we can be sure values accurate. 4.  Please call insurance to see if there is an alternative for your prior Advair. You can use the list provided at visit to see what medications are in the same family as the Advair as reviewed.

## 2019-11-22 NOTE — PROGRESS NOTES
History of Present Illness:   Manolo Cruz is a 75 y.o. female here for evaluation:    Chief Complaint   Patient presents with    Cold Symptoms     Patient reports cough and sinus congestion, occuring intermittently since summer. Patient has concerns with asthma, reports wheezing throughout the day and SOB    Medication Refill     Patient request refills on all inhalers, orders pended. Here to evaluate above. Notes:  Patient not fasting, had a piece of toast. Refused flu vaccine. Patient request doctor's note to excuse her from yesterday and today, return on Monday. Patient states fluid pill (Hctz) is harsh, would like to know of alternatives. Patient reports lisinopril causes leg pain. Stopped taking Adderall for the past month, believes that may be contributing to low energy. Patient would like med eval for singulair. From notes, she was offered appt to evaluate cough symptoms Sept, but refused to come to office then. LOV with BRANDON [facility] here was May 2019. Prescription Monitoring Program ([place]) database query with fills:  08/08/2019 1 08/05/2019 Dextroamp-Amphetamin 10 Mg Tab 90.00 30 Ab Nalini [id number] Rit ([id number]) 0 [facility] [place]   05/26/2019 1 05/22/2019 Dextroamp-Amphetamin 10 Mg Tab 90.00 30 Ab Nalini [id number] Rit ([id number]) 0 [place]   03/19/2019 1 01/28/2019 Dextroamp-Amphetamin 10 Mg Tab 90.00 30 Ab Nalini [id number] Rit ([id number]) 0 [place]    Notes no problems with current ADHD medication as above. Prefers as per prior dosing     Pt requested refills as above--reviewed meds as below. Nursing screenings reviewed by provider at visit.       Past Medical History:   Diagnosis Date    ADD (attention deficit disorder)     Anxiety and depression     DDD (degenerative disc disease), lumbar     dr Julio Pickard Diet-controlled type 2 diabetes mellitus ([address]     Epicondylitis, lateral     Fibromyalgia     Hx of diabetes mellitus     Hyperlipidemia LDL goal < 100     Hypertension     OA (osteoarthritis)     Rotator cuff rupture     Tobacco abuse         Prior to Admission medications    Medication Sig Start Date End Date Taking? Authorizing Provider   escitalopram oxalate (LEXAPRO) 20 mg tablet TAKE 2 TABLETS BY MOUTH ONCE DAILY 10/7/19  Yes Bradford Valdes NP   ezetimibe (ZETIA) 10 mg tablet Take 1 Tab by mouth daily. 8/5/19  Yes Delroy HARDY NP   beclomethasone dipropionate (QVAR REDIHALER) 40 mcg/actuation HFAb inhaler Take 1 Puff by inhalation two (2) times a day. 8/5/19  Yes Bradford Valdes NP   prasugrel (EFFIENT) 10 mg tablet take 1 tablet by mouth once daily 4/24/19  Yes Provider, Historical   montelukast (SINGULAIR) 10 mg tablet TAKE 1 TABLET BY MOUTH DAILY 5/22/19  Yes Bradford Valdes NP   PROAIR HFA 90 mcg/actuation inhaler inhale 2 puffs by mouth every 4 hours if needed wheezing 4/26/19  Yes Bradford Valdes NP   metoprolol succinate (TOPROL-XL) 25 mg XL tablet Take 1 Tab by mouth daily. 2/18/19  Yes Bradford Valdes NP   lisinopril (PRINIVIL, ZESTRIL) 20 mg tablet Take 1 Tab by mouth daily. Patient taking differently: Take 40 mg by mouth daily. 1/28/19  Yes Bradford Valdes NP   hydroCHLOROthiazide (HYDRODIURIL) 25 mg tablet Take 1 Tab by mouth daily. 1/28/19  Yes Bradford Valdes NP   metFORMIN ER (GLUCOPHAGE XR) 500 mg tablet Take 1 Tab by mouth daily (with dinner). 1/28/19  Yes Bradford Valdes NP   albuterol (PROVENTIL HFA, VENTOLIN HFA, PROAIR HFA) 90 mcg/actuation inhaler Take 2 Puffs by inhalation every four (4) hours as needed for Wheezing. 9/13/18  Yes Bradford Valdes NP   dextroamphetamine-amphetamine (ADDERALL) 10 mg tablet take 3 tablets by mouth once daily 8/5/19   Bradford Valdes NP   atorvastatin (LIPITOR) 40 mg tablet Take 1 Tab by mouth daily.  1/28/19   Bradford Valdes NP        ROS    Vitals:    11/22/19 1117 11/22/19 1150   BP: (!) 178/100 (!) 160/91   Pulse: 82    Resp: 15    Temp: 98.5 °F (36.9 °C)    TempSrc: Oral    SpO2: 96%    Weight: 184 lb (83.5 kg)    Height: 5' 2\" (1.575 m)    PainSc:   0 - No pain       Body mass index is 33.65 kg/m². Physical Exam:     Physical Exam  Constitutional:       General: She is not in acute distress. Appearance: She is well-developed. She is not diaphoretic. HENT:      Head: Normocephalic and atraumatic. Comments: Moderate boggy nasopharyngeal edema present bilaterally left > right, posteriorly, with slight amount clear discharge bilat. Mouth/Throat:      Mouth: Mucous membranes are moist.   Eyes:      General: No scleral icterus. Right eye: No discharge. Left eye: No discharge. Conjunctiva/sclera: Conjunctivae normal.   Neck:      Musculoskeletal: Neck supple. Cardiovascular:      Rate and Rhythm: Normal rate and regular rhythm. Heart sounds: Normal heart sounds. No murmur. No friction rub. No gallop. Pulmonary:      Effort: Pulmonary effort is normal. No respiratory distress. Breath sounds: Normal breath sounds. No stridor. No wheezing, rhonchi or rales. Comments: No wheezing or forced expiratory wheezing. Chest:      Chest wall: No tenderness. Abdominal:      General: Bowel sounds are normal. There is no distension. Palpations: Abdomen is soft. Tenderness: There is no tenderness. Musculoskeletal:         General: No tenderness. Skin:     General: Skin is warm. Coloration: Skin is not pale. Findings: No erythema or rash. Neurological:      Mental Status: She is alert. Motor: No abnormal muscle tone. Coordination: Coordination normal.   Psychiatric:         Behavior: Behavior normal.         Thought Content: Thought content normal.         Judgment: Judgment normal.         Assessment and Plan:       ICD-10-CM ICD-9-CM    1. Moderate persistent asthma without complication M10.78 990.16 albuterol (PROVENTIL HFA, VENTOLIN HFA, PROAIR HFA) 90 mcg/actuation inhaler      beclomethasone (QVAR) 80 mcg/actuation aero   2.  Attention deficit disorder (ADD) without hyperactivity F98.8 314.00 dextroamphetamine-amphetamine (ADDERALL) 10 mg tablet   3. Essential hypertension I10 401.9 hydroCHLOROthiazide (HYDRODIURIL) 25 mg tablet      metoprolol succinate (TOPROL-XL) 50 mg XL tablet   4. Bilateral leg edema R60.0 782.3 hydroCHLOROthiazide (HYDRODIURIL) 25 mg tablet   5. Seasonal allergic rhinitis due to other allergic trigger J30.89 477.8 cetirizine (ZYRTEC) 10 mg tablet      azelastine (ASTELIN) 137 mcg (0.1 %) nasal spray      Printed, then eRx'd: azelastine (ASTELIN) 137 mcg (0.1 %) nasal spray       1. Refill albuterol reviewed. Refill Qvar with increase dose as above. 2.  Refill reviewed at visit--eRx'd.    3,4. Increase metoprolol dose reviewed. Decrease HCTZ to 12.5mg--patient notes able to break pill. Reviewed blood pressure follow-up with PCP follow-up as below    5. Asteline printed, then e-rx'd. Plan add Astelin nasal spray if needed as reviewed. Follow-up and Dispositions    · Return in about 2 weeks (around 12/6/2019) for Blood Pressure follow-up, fasting labs. lab results and schedule of future lab studies reviewed with patient  reviewed medications and side effects in detail    For additional documentation of information and/or recommendations discussed this visit, please see notes in instructions. Plan and evaluation (above) reviewed with pt at visit  Patient voiced understanding of plan and provided with time to ask/review questions. After Visit Summary (AVS) provided to pt after visit with additional instructions as needed/reviewed. No future appointments. Greater than 50% of the time in this 50min visit was spent by me in face-to-face counseling and coordination of care--reviewing medication changes, follow-up and mgt at visit.

## 2019-11-22 NOTE — TELEPHONE ENCOUNTER
----- Message from Patience Den sent at 11/22/2019  2:58 PM EST -----  Regarding: Dr. Espana Pain  Medication Refill    Caller (if not patient):      Relationship of caller (if not patient):      Best contact number(s): [phone]      Name of medication and dosage if known:\"Singulair\"      Is patient out of this medication (yes/no):yes      Pharmacy name:[facility] [address] listed in chart? (yes/no):yes  Pharmacy phone number:      Details to clarify the request:Pt stated she forgot one of her Rxs to be refilled at her appt today.       Patience Crenshaw

## 2019-11-22 NOTE — TELEPHONE ENCOUNTER
Medication refill request:    Last Office Visit:  11/22/19  Next Office Visit:  No future appointments. Pharmacy verified.  yes

## 2019-11-22 NOTE — PROGRESS NOTES
RM 17    Patient not fasting, had a piece of toast. Refused flu vaccine. Patient request doctor's note to excuse her from yesterday and today, return on Monday. Patient states fluid pill (Hctz) is harsh, would like to know of alternatives. Patient reports lisinopril causes leg pain. Stopped taking Adderall for the past month, believes that may be contributing to low energy. Patient would like med eval for singulair. Chief Complaint   Patient presents with    Cold Symptoms     Patient reports cough and sinus congestion, occuring intermittently since summer. Patient has concerns with asthma, reports wheezing throughout the day and SOB    Medication Refill     Patient request refills on all inhalers, orders pended. 1. Have you been to the ER, urgent care clinic since your last visit? Hospitalized since your last visit? No    2. Have you seen or consulted any other health care providers outside of the [address] [place] [address] since your last visit? Include any pap smears or colon screening. No     Health Maintenance Due   Topic Date Due    Hepatitis C Screening  1955    DTaP/Tdap/Td series (1 - Tdap) 11/20/1964    Shingrix Vaccine Age 50> (1 of 2) 11/20/2003    FOBT Q 1 YEAR AGE 50-75  03/19/2016    EYE EXAM RETINAL OR DILATED  03/19/2017    GLAUCOMA SCREENING Q2Y  11/20/2018    Bone Densitometry (Dexa) Screening  11/20/2018    Pneumococcal 65+ years (1 of 1 - PPSV23) 11/20/2018    BREAST CANCER SCRN MAMMOGRAM  02/03/2019    HEMOGLOBIN A1C Q6M  11/23/2019     Abuse Screening Questionnaire 11/22/2019   Do you ever feel afraid of your partner? N   Are you in a relationship with someone who physically or mentally threatens you? N   Is it safe for you to go home?  Y     3 most recent PHQ Screens 11/22/2019   Little interest or pleasure in doing things Not at all   Feeling down, depressed, irritable, or hopeless Not at all   Total Score PHQ 2 0   Trouble falling or staying asleep, or sleeping too much -   Feeling tired or having little energy -   Poor appetite, weight loss, or overeating -   Feeling bad about yourself - or that you are a failure or have let yourself or your family down -   Trouble concentrating on things such as school, work, reading, or watching TV -   Moving or speaking so slowly that other people could have noticed; or the opposite being so fidgety that others notice -   Thoughts of being better off dead, or hurting yourself in some way -   PHQ 9 Score -   How difficult have these problems made it for you to do your work, take care of your home and get along with others -       Fall Risk Assessment, last 12 mths 11/22/2019   Able to walk? Yes   Fall in past 12 months?  No

## 2019-11-22 NOTE — LETTER
NOTIFICATION RETURN TO WORK / SCHOOL 
 
11/22/2019 12:36 PM 
 
Ms. Wilma Matt [address] [facility] [phone] To Whom It May Concern: 
 
Wilma Matt is currently under the care of [facility]. She will return to work/school on: 11/25/19 If there are questions or concerns please have the patient contact our office.  
 
 
 
Sincerely, 
 
 
Roselia Mast MD

## 2019-11-22 NOTE — TELEPHONE ENCOUNTER
Pt picked up all of her medication's however there was not a Singular prescription or a prescription for the Nebulizer Solution. Pt would like if these could bee approved and called in today to [facility].

## 2019-11-23 DIAGNOSIS — J45.42 MODERATE PERSISTENT ASTHMA WITH STATUS ASTHMATICUS: ICD-10-CM

## 2019-11-25 RX ORDER — MONTELUKAST SODIUM 10 MG/1
TABLET ORAL
Qty: 90 TAB | Refills: 0 | Status: SHIPPED | OUTPATIENT
Start: 2019-11-25 | End: 2020-12-02 | Stop reason: SDUPTHER

## 2019-11-25 RX ORDER — ALBUTEROL SULFATE 0.83 MG/ML
2.5 SOLUTION RESPIRATORY (INHALATION)
Qty: 30 NEBULE | Refills: 1 | Status: SHIPPED | OUTPATIENT
Start: 2019-11-25 | End: 2020-02-14 | Stop reason: SDUPTHER

## 2019-11-25 RX ORDER — MONTELUKAST SODIUM 10 MG/1
TABLET ORAL
Qty: 90 TAB | Refills: 0 | Status: SHIPPED | OUTPATIENT
Start: 2019-11-25 | End: 2019-11-25 | Stop reason: SDUPTHER

## 2019-12-04 ENCOUNTER — LAB ONLY (OUTPATIENT)
Dept: INTERNAL MEDICINE CLINIC | Age: 64
End: 2019-12-04

## 2019-12-04 DIAGNOSIS — I10 ESSENTIAL HYPERTENSION: Primary | ICD-10-CM

## 2019-12-04 DIAGNOSIS — E55.9 VITAMIN D DEFICIENCY: ICD-10-CM

## 2019-12-04 DIAGNOSIS — E78.2 MIXED HYPERLIPIDEMIA: ICD-10-CM

## 2019-12-04 DIAGNOSIS — E11.9 TYPE 2 DIABETES MELLITUS WITHOUT COMPLICATION, WITHOUT LONG-TERM CURRENT USE OF INSULIN (HCC): ICD-10-CM

## 2019-12-05 LAB
25(OH)D3+25(OH)D2 SERPL-MCNC: 26.8 NG/ML (ref 30–100)
ALBUMIN SERPL-MCNC: 4.5 G/DL (ref 3.6–4.8)
ALBUMIN/GLOB SERPL: 2 {RATIO} (ref 1.2–2.2)
ALP SERPL-CCNC: 121 IU/L (ref 39–117)
ALT SERPL-CCNC: 15 IU/L (ref 0–32)
AST SERPL-CCNC: 11 IU/L (ref 0–40)
BASOPHILS # BLD AUTO: 0.1 X10E3/UL (ref 0–0.2)
BASOPHILS NFR BLD AUTO: 1 %
BILIRUB SERPL-MCNC: 0.2 MG/DL (ref 0–1.2)
BUN SERPL-MCNC: 18 MG/DL (ref 8–27)
BUN/CREAT SERPL: 23 (ref 12–28)
CALCIUM SERPL-MCNC: 9.8 MG/DL (ref 8.7–10.3)
CHLORIDE SERPL-SCNC: 101 MMOL/L (ref 96–106)
CHOLEST SERPL-MCNC: 264 MG/DL (ref 100–199)
CO2 SERPL-SCNC: 22 MMOL/L (ref 20–29)
CREAT SERPL-MCNC: 0.77 MG/DL (ref 0.57–1)
EOSINOPHIL # BLD AUTO: 0.3 X10E3/UL (ref 0–0.4)
EOSINOPHIL NFR BLD AUTO: 3 %
ERYTHROCYTE [DISTWIDTH] IN BLOOD BY AUTOMATED COUNT: 12.6 % (ref 12.3–15.4)
EST. AVERAGE GLUCOSE BLD GHB EST-MCNC: 140 MG/DL
GLOBULIN SER CALC-MCNC: 2.2 G/DL (ref 1.5–4.5)
GLUCOSE SERPL-MCNC: 144 MG/DL (ref 65–99)
HBA1C MFR BLD: 6.5 % (ref 4.8–5.6)
HCT VFR BLD AUTO: 38.7 % (ref 34–46.6)
HDLC SERPL-MCNC: 42 MG/DL
HGB BLD-MCNC: 13 G/DL (ref 11.1–15.9)
IMM GRANULOCYTES # BLD AUTO: 0 X10E3/UL (ref 0–0.1)
IMM GRANULOCYTES NFR BLD AUTO: 0 %
LDLC SERPL CALC-MCNC: 178 MG/DL (ref 0–99)
LYMPHOCYTES # BLD AUTO: 2.4 X10E3/UL (ref 0.7–3.1)
LYMPHOCYTES NFR BLD AUTO: 27 %
MCH RBC QN AUTO: 30.2 PG (ref 26.6–33)
MCHC RBC AUTO-ENTMCNC: 33.6 G/DL (ref 31.5–35.7)
MCV RBC AUTO: 90 FL (ref 79–97)
MONOCYTES # BLD AUTO: 0.7 X10E3/UL (ref 0.1–0.9)
MONOCYTES NFR BLD AUTO: 8 %
NEUTROPHILS # BLD AUTO: 5.5 X10E3/UL (ref 1.4–7)
NEUTROPHILS NFR BLD AUTO: 61 %
PLATELET # BLD AUTO: 347 X10E3/UL (ref 150–450)
POTASSIUM SERPL-SCNC: 4.2 MMOL/L (ref 3.5–5.2)
PROT SERPL-MCNC: 6.7 G/DL (ref 6–8.5)
RBC # BLD AUTO: 4.31 X10E6/UL (ref 3.77–5.28)
SODIUM SERPL-SCNC: 139 MMOL/L (ref 134–144)
TRIGL SERPL-MCNC: 221 MG/DL (ref 0–149)
VLDLC SERPL CALC-MCNC: 44 MG/DL (ref 5–40)
WBC # BLD AUTO: 9 X10E3/UL (ref 3.4–10.8)

## 2019-12-30 ENCOUNTER — OFFICE VISIT (OUTPATIENT)
Dept: INTERNAL MEDICINE CLINIC | Age: 64
End: 2019-12-30

## 2019-12-30 VITALS
OXYGEN SATURATION: 97 % | SYSTOLIC BLOOD PRESSURE: 133 MMHG | DIASTOLIC BLOOD PRESSURE: 83 MMHG | TEMPERATURE: 98.4 F | HEART RATE: 88 BPM | HEIGHT: 62 IN | WEIGHT: 179 LBS | BODY MASS INDEX: 32.94 KG/M2 | RESPIRATION RATE: 16 BRPM

## 2019-12-30 DIAGNOSIS — F98.8 ATTENTION DEFICIT DISORDER (ADD) WITHOUT HYPERACTIVITY: ICD-10-CM

## 2019-12-30 DIAGNOSIS — J45.42 MODERATE PERSISTENT ASTHMA WITH STATUS ASTHMATICUS: ICD-10-CM

## 2019-12-30 DIAGNOSIS — I10 ESSENTIAL HYPERTENSION: ICD-10-CM

## 2019-12-30 DIAGNOSIS — R60.0 BILATERAL LEG EDEMA: ICD-10-CM

## 2019-12-30 DIAGNOSIS — J01.90 ACUTE NON-RECURRENT SINUSITIS, UNSPECIFIED LOCATION: Primary | ICD-10-CM

## 2019-12-30 RX ORDER — DEXTROAMPHETAMINE SACCHARATE, AMPHETAMINE ASPARTATE, DEXTROAMPHETAMINE SULFATE AND AMPHETAMINE SULFATE 2.5; 2.5; 2.5; 2.5 MG/1; MG/1; MG/1; MG/1
TABLET ORAL
Qty: 90 TAB | Refills: 0 | Status: SHIPPED | OUTPATIENT
Start: 2020-02-29 | End: 2020-05-19 | Stop reason: SDUPTHER

## 2019-12-30 RX ORDER — DEXTROAMPHETAMINE SACCHARATE, AMPHETAMINE ASPARTATE, DEXTROAMPHETAMINE SULFATE AND AMPHETAMINE SULFATE 2.5; 2.5; 2.5; 2.5 MG/1; MG/1; MG/1; MG/1
TABLET ORAL
Qty: 90 TAB | Refills: 0 | Status: SHIPPED | OUTPATIENT
Start: 2020-01-29 | End: 2020-05-19 | Stop reason: SDUPTHER

## 2019-12-30 RX ORDER — CEFDINIR 300 MG/1
300 CAPSULE ORAL 2 TIMES DAILY
Qty: 20 CAP | Refills: 0 | Status: SHIPPED | OUTPATIENT
Start: 2019-12-30 | End: 2020-01-09

## 2019-12-30 RX ORDER — HYDROCHLOROTHIAZIDE 25 MG/1
12.5 TABLET ORAL DAILY
Qty: 90 TAB | Refills: 1 | Status: SHIPPED | OUTPATIENT
Start: 2019-12-30 | End: 2020-12-02 | Stop reason: SDUPTHER

## 2019-12-30 RX ORDER — PREDNISONE 20 MG/1
40 TABLET ORAL
Qty: 10 TAB | Refills: 0 | Status: SHIPPED | OUTPATIENT
Start: 2019-12-30 | End: 2020-01-04

## 2019-12-30 RX ORDER — DEXTROAMPHETAMINE SACCHARATE, AMPHETAMINE ASPARTATE, DEXTROAMPHETAMINE SULFATE AND AMPHETAMINE SULFATE 2.5; 2.5; 2.5; 2.5 MG/1; MG/1; MG/1; MG/1
TABLET ORAL
Qty: 90 TAB | Refills: 0 | Status: SHIPPED | OUTPATIENT
Start: 2019-12-30 | End: 2020-05-19 | Stop reason: SDUPTHER

## 2019-12-30 NOTE — PROGRESS NOTES
HPI:  Presents for acute care    Several weeks of off and on URI sx  Now with increased ST, PND, frontal HAs    +sick contacts at work    Seen in November and increased Qvar   Using Qvar, singulair  Some AVILEZ, fatigue  +wheeze at night  Using albuterol prn every couple of days    Requests adderall refill. Works well for her  Tolerating - no adverse effects reported. Past medical, Social, and Family history reviewed    Prior to Admission medications    Medication Sig Start Date End Date Taking? Authorizing Provider   montelukast (SINGULAIR) 10 mg tablet TAKE 1 TABLET BY MOUTH DAILY 11/25/19  Yes Clifton De La Cruz, BRANDON   albuterol (PROVENTIL HFA, VENTOLIN HFA, PROAIR HFA) 90 mcg/actuation inhaler Take 2 Puffs by inhalation every four (4) hours as needed for Wheezing or Shortness of Breath. 11/22/19  Yes Joyce Franco MD   dextroamphetamine-amphetamine (ADDERALL) 10 mg tablet Take 10mg as directed. May take up to 3 tablets by mouth in 24hr period. 11/22/19  Yes Joyce Franco MD   ezetimibe (ZETIA) 10 mg tablet Take 1 Tab by mouth daily. 11/22/19  Yes Joyce Franco MD   hydroCHLOROthiazide (HYDRODIURIL) 25 mg tablet Take 0.5 Tabs by mouth daily as needed for Other (lower extremity swelling). Patient taking differently: Take 12.5 mg by mouth daily. 11/22/19  Yes Joyce Franco MD   metoprolol succinate (TOPROL-XL) 50 mg XL tablet Take 1 Tab by mouth daily. 11/22/19  Yes Joyce Franco MD   beclomethasone (QVAR) 80 mcg/actuation aero Take 1 Puff by inhalation two (2) times a day. 11/22/19  Yes Joyce Franco MD   cetirizine (ZYRTEC) 10 mg tablet Take 1 Tab by mouth daily. 11/22/19  Yes Joyce Franco MD   azelastine (ASTELIN) 137 mcg (0.1 %) nasal spray 2 Sprays by Both Nostrils route two (2) times daily as needed for Rhinitis (allergies).  11/22/19  Yes Joyce Franco MD   escitalopram oxalate (LEXAPRO) 20 mg tablet TAKE 2 TABLETS BY MOUTH ONCE DAILY 10/7/19  Yes Adriel Barba NP   albuterol (PROVENTIL VENTOLIN) 2.5 mg /3 mL (0.083 %) nebu 3 mL by Nebulization route every four (4) hours as needed for Cough (wheezing, SOB). 11/25/19   Adriel Barba NP   prasugrel (EFFIENT) 10 mg tablet take 1 tablet by mouth once daily 4/24/19   Provider, Historical   atorvastatin (LIPITOR) 40 mg tablet Take 1 Tab by mouth daily. 1/28/19   Adriel Barba NP   metFORMIN ER (GLUCOPHAGE XR) 500 mg tablet Take 1 Tab by mouth daily (with dinner). 1/28/19   Adriel Barba NP          ROS  Complete ROS reviewed and negative or stable except as noted in HPI. Physical Exam  Vitals signs and nursing note reviewed. Constitutional:       General: She is not in acute distress. HENT:      Head: Normocephalic and atraumatic. Eyes:      General: No scleral icterus. Pupils: Pupils are equal, round, and reactive to light. Neck:      Musculoskeletal: Normal range of motion and neck supple. Cardiovascular:      Heart sounds: Normal heart sounds. Pulmonary:      Effort: Pulmonary effort is normal. No respiratory distress. Breath sounds: Wheezing (end exp, more pronounced with forced exp) present. No rales. Abdominal:      General: There is no distension. Palpations: Abdomen is soft. Tenderness: There is no tenderness. Musculoskeletal: Normal range of motion. Skin:     General: Skin is warm. Findings: No rash. Neurological:      Mental Status: She is alert and oriented to person, place, and time. Motor: No abnormal muscle tone. Prior labs reviewed. Assessment/Plan:    ICD-10-CM ICD-9-CM    1. Acute non-recurrent sinusitis, unspecified location J01.90 461.9    2. Essential hypertension I10 401.9 hydroCHLOROthiazide (HYDRODIURIL) 25 mg tablet   3. Bilateral leg edema R60.0 782.3 hydroCHLOROthiazide (HYDRODIURIL) 25 mg tablet   4.  Moderate persistent asthma with status asthmaticus J45.42 493.91 predniSONE (DELTASONE) 20 mg tablet cefdinir (OMNICEF) 300 mg capsule   5. Attention deficit disorder (ADD) without hyperactivity F98.8 314.00 dextroamphetamine-amphetamine (ADDERALL) 10 mg tablet      dextroamphetamine-amphetamine (ADDERALL) 10 mg tablet      dextroamphetamine-amphetamine (ADDERALL) 10 mg tablet     Follow-up and Dispositions    · Return in about 3 months (around 3/30/2020), or if symptoms worsen or fail to improve, for blood pressure, diabetes, asthma.        results and schedule of future studies reviewed with patient  reviewed diet, exercise and weight   cardiovascular risk and specific lipid/LDL goals reviewed  reviewed medications and side effects in detail   Refill adderall x 3 months  omnicef due to macrolide allergy  pred burst  mucinex   Continue Qvar and singulair

## 2019-12-30 NOTE — PROGRESS NOTES
Rm#13  Pt states she isnt allergic to morphine     Chief Complaint   Patient presents with    Cold Symptoms     ear pain-left, headache, cough, SOB, sore throat. easily fatigued, post nasal drainage, sore inside left nostril x1 month      1. Have you been to the ER, urgent care clinic since your last visit? Hospitalized since your last visit? No    2. Have you seen or consulted any other health care providers outside of the [address] [place] [address] since your last visit? Include any pap smears or colon screening. No     Health Maintenance Due   Topic Date Due    Hepatitis C Screening  1955    DTaP/Tdap/Td series (1 - Tdap) 11/20/1964    Shingrix Vaccine Age 50> (1 of 2) 11/20/2003    FOBT Q 1 YEAR AGE 50-75  03/19/2016    EYE EXAM RETINAL OR DILATED  03/19/2017    GLAUCOMA SCREENING Q2Y  11/20/2018    Bone Densitometry (Dexa) Screening  11/20/2018    Pneumococcal 65+ years (1 of 1 - PPSV23) 11/20/2018    BREAST CANCER SCRN MAMMOGRAM  02/03/2019    MICROALBUMIN Q1  01/28/2020     Fall Risk Assessment, last 12 mths 12/30/2019   Able to walk? Yes   Fall in past 12 months?  No               .

## 2019-12-30 NOTE — LETTER
NOTIFICATION RETURN TO WORK  
 
12/30/2019 Ms. Odilon Fernández 3 [facility] [phone] To Whom It May Concern: 
 
Odilon Fernández is currently under the care of [facility]. She will return to work  on: 12/31/19 If there are questions or concerns please have the patient contact our office. Sincerely, Frank Goltz, MD

## 2020-01-06 ENCOUNTER — OFFICE VISIT (OUTPATIENT)
Dept: INTERNAL MEDICINE CLINIC | Age: 65
End: 2020-01-06

## 2020-01-06 VITALS
SYSTOLIC BLOOD PRESSURE: 169 MMHG | WEIGHT: 194 LBS | BODY MASS INDEX: 35.7 KG/M2 | DIASTOLIC BLOOD PRESSURE: 83 MMHG | HEART RATE: 83 BPM | TEMPERATURE: 97.7 F | RESPIRATION RATE: 16 BRPM | HEIGHT: 62 IN

## 2020-01-06 DIAGNOSIS — E08.9 DIABETES MELLITUS DUE TO UNDERLYING CONDITION WITHOUT COMPLICATION, WITHOUT LONG-TERM CURRENT USE OF INSULIN (HCC): ICD-10-CM

## 2020-01-06 DIAGNOSIS — F90.9 ATTENTION DEFICIT HYPERACTIVITY DISORDER (ADHD), UNSPECIFIED ADHD TYPE: ICD-10-CM

## 2020-01-06 DIAGNOSIS — J45.21 MILD INTERMITTENT ASTHMATIC BRONCHITIS WITH ACUTE EXACERBATION: Primary | ICD-10-CM

## 2020-01-06 DIAGNOSIS — E11.9 TYPE 2 DIABETES MELLITUS WITHOUT COMPLICATION, WITHOUT LONG-TERM CURRENT USE OF INSULIN (HCC): ICD-10-CM

## 2020-01-06 DIAGNOSIS — I10 ESSENTIAL HYPERTENSION: ICD-10-CM

## 2020-01-06 DIAGNOSIS — R06.2 WHEEZING: ICD-10-CM

## 2020-01-06 RX ORDER — PREDNISONE 10 MG/1
TABLET ORAL
Qty: 21 TAB | Refills: 0 | Status: SHIPPED | OUTPATIENT
Start: 2020-01-06 | End: 2020-01-28 | Stop reason: SDUPTHER

## 2020-01-06 NOTE — PROGRESS NOTES
HPI:  Presents for f/u resp illness    Seen 12/30/19    Feeling a little better  Not fully recovered. Loose BMs yest.  Taking a probiotic       Past medical, Social, and Family history reviewed    Prior to Admission medications    Medication Sig Start Date End Date Taking? Authorizing Provider   hydroCHLOROthiazide (HYDRODIURIL) 25 mg tablet Take 0.5 Tabs by mouth daily. 12/30/19  Yes Yuni Garg MD   cefdinir (OMNICEF) 300 mg capsule Take 1 Cap by mouth two (2) times a day for 10 days. 12/30/19 1/9/20 Yes Yuni Garg MD   dextroamphetamine-amphetamine (ADDERALL) 10 mg tablet take 3 tablets by mouth once daily 2/29/20  Yes Yuni Garg MD   dextroamphetamine-amphetamine (ADDERALL) 10 mg tablet take 3 tablets by mouth once daily  Indications: Attention Deficit Disorder with Hyperactivity 1/29/20  Yes Yuni Garg MD   dextroamphetamine-amphetamine (ADDERALL) 10 mg tablet take 3 tablets by mouth once daily 12/30/19  Yes Yuni Garg MD   montelukast (SINGULAIR) 10 mg tablet TAKE 1 TABLET BY MOUTH DAILY 11/25/19  Yes Renay Queen NP   albuterol (PROVENTIL VENTOLIN) 2.5 mg /3 mL (0.083 %) nebu 3 mL by Nebulization route every four (4) hours as needed for Cough (wheezing, SOB). 11/25/19  Yes Renay Queen NP   albuterol (PROVENTIL HFA, VENTOLIN HFA, PROAIR HFA) 90 mcg/actuation inhaler Take 2 Puffs by inhalation every four (4) hours as needed for Wheezing or Shortness of Breath. 11/22/19  Yes Jules Pink MD   dextroamphetamine-amphetamine (ADDERALL) 10 mg tablet Take 10mg as directed. May take up to 3 tablets by mouth in 24hr period. 11/22/19  Yes Jules Pink MD   ezetimibe (ZETIA) 10 mg tablet Take 1 Tab by mouth daily. 11/22/19  Yes Jules Pink MD   metoprolol succinate (TOPROL-XL) 50 mg XL tablet Take 1 Tab by mouth daily.  11/22/19  Yes Jules Pink MD   beclomethasone (QVAR) 80 mcg/actuation aero Take 1 Puff by inhalation two (2) times a day. 11/22/19  Yes Tara Mejia MD   cetirizine (ZYRTEC) 10 mg tablet Take 1 Tab by mouth daily. 11/22/19  Yes Tara Mejia MD   azelastine (ASTELIN) 137 mcg (0.1 %) nasal spray 2 Sprays by Both Nostrils route two (2) times daily as needed for Rhinitis (allergies). 11/22/19  Yes Tara Mejia MD   escitalopram oxalate (LEXAPRO) 20 mg tablet TAKE 2 TABLETS BY MOUTH ONCE DAILY 10/7/19  Yes Saud Owusu NP   prasugrel (EFFIENT) 10 mg tablet take 1 tablet by mouth once daily 4/24/19  Yes Provider, Historical   atorvastatin (LIPITOR) 40 mg tablet Take 1 Tab by mouth daily. 1/28/19  Yes Saud Owusu NP   metFORMIN ER (GLUCOPHAGE XR) 500 mg tablet Take 1 Tab by mouth daily (with dinner). 1/28/19  Yes Saud Owusu NP          ROS  Complete ROS reviewed and negative or stable except as noted in HPI. Physical Exam  Vitals signs and nursing note reviewed. Constitutional:       General: She is not in acute distress. HENT:      Head: Normocephalic and atraumatic. Eyes:      General: No scleral icterus. Pupils: Pupils are equal, round, and reactive to light. Neck:      Musculoskeletal: Normal range of motion and neck supple. Cardiovascular:      Rate and Rhythm: Normal rate and regular rhythm. Heart sounds: Normal heart sounds. No murmur. No friction rub. No gallop. Pulmonary:      Effort: Pulmonary effort is normal. No respiratory distress. Breath sounds: Wheezing present. Abdominal:      General: There is no distension. Palpations: Abdomen is soft. Tenderness: There is no tenderness. Musculoskeletal: Normal range of motion. Skin:     General: Skin is warm. Findings: No rash. Neurological:      Mental Status: She is alert and oriented to person, place, and time. Motor: No abnormal muscle tone. Prior labs reviewed.       Assessment/Plan:    ICD-10-CM ICD-9-CM    1. Mild intermittent asthmatic bronchitis with acute exacerbation J45.21 493.92 predniSONE (DELTASONE) 10 mg tablet   2. Wheezing R06.2 786.07 predniSONE (DELTASONE) 10 mg tablet   3. Type 2 diabetes mellitus without complication, without long-term current use of insulin (HCC) E11.9 250.00    4. Essential hypertension I10 401.9    5. Diabetes mellitus due to underlying condition without complication, without long-term current use of insulin (HCC) E08.9 249.00    6. Attention deficit hyperactivity disorder (ADHD), unspecified ADHD type F90.9 314.01      Follow-up and Dispositions    · Return in about 1 month (around 2/6/2020), or if symptoms worsen or fail to improve, for blood pressure, resp status.        results and schedule of future studies reviewed with patient  reviewed diet, exercise and weight   cardiovascular risk and specific lipid/LDL goals reviewed  reviewed medications and side effects in detail   pred taper  mucinex  Complete abx  Albuterol prn  Monitor BP

## 2020-01-06 NOTE — LETTER
NOTIFICATION RETURN TO WORK  
 
1/6/2020 Ms. Cynthia Jackson 3 [facility] [phone] To Whom It May Concern: 
 
Cynthia Jackson is currently under the care of [facility]. She will return to work/school on: 1/7/20 If there are questions or concerns please have the patient contact our office. Sincerely, Fay Denver, MD

## 2020-01-06 NOTE — PROGRESS NOTES
RM 13    Chief Complaint   Patient presents with    Flu Like Symptoms     f/u from      1. Have you been to the ER, urgent care clinic since your last visit? Hospitalized since your last visit? No    2. Have you seen or consulted any other health care providers outside of the [address] [place] [address] since your last visit? Include any pap smears or colon screening.  No

## 2020-01-15 DIAGNOSIS — F32.A ANXIETY AND DEPRESSION: ICD-10-CM

## 2020-01-15 DIAGNOSIS — F41.9 ANXIETY AND DEPRESSION: ICD-10-CM

## 2020-01-15 RX ORDER — ESCITALOPRAM OXALATE 20 MG/1
TABLET ORAL
Qty: 60 TAB | Refills: 0 | Status: SHIPPED | OUTPATIENT
Start: 2020-01-15 | End: 2020-02-28

## 2020-01-28 ENCOUNTER — OFFICE VISIT (OUTPATIENT)
Dept: INTERNAL MEDICINE CLINIC | Age: 65
End: 2020-01-28

## 2020-01-28 VITALS
OXYGEN SATURATION: 96 % | DIASTOLIC BLOOD PRESSURE: 71 MMHG | TEMPERATURE: 98 F | BODY MASS INDEX: 34.04 KG/M2 | SYSTOLIC BLOOD PRESSURE: 173 MMHG | WEIGHT: 185 LBS | HEIGHT: 62 IN | HEART RATE: 79 BPM | RESPIRATION RATE: 16 BRPM

## 2020-01-28 DIAGNOSIS — R06.2 WHEEZING: ICD-10-CM

## 2020-01-28 DIAGNOSIS — J45.21 MILD INTERMITTENT ASTHMATIC BRONCHITIS WITH ACUTE EXACERBATION: ICD-10-CM

## 2020-01-28 DIAGNOSIS — J06.9 UPPER RESPIRATORY TRACT INFECTION, UNSPECIFIED TYPE: Primary | ICD-10-CM

## 2020-01-28 DIAGNOSIS — E66.01 SEVERE OBESITY (HCC): ICD-10-CM

## 2020-01-28 RX ORDER — PREDNISONE 10 MG/1
TABLET ORAL
Qty: 21 TAB | Refills: 0 | Status: SHIPPED | OUTPATIENT
Start: 2020-01-28 | End: 2020-02-10 | Stop reason: ALTCHOICE

## 2020-01-28 RX ORDER — AMOXICILLIN AND CLAVULANATE POTASSIUM 875; 125 MG/1; MG/1
1 TABLET, FILM COATED ORAL 2 TIMES DAILY
Qty: 20 TAB | Refills: 0 | Status: SHIPPED | OUTPATIENT
Start: 2020-01-28 | End: 2020-12-29 | Stop reason: SDUPTHER

## 2020-01-28 NOTE — LETTER
NOTIFICATION RETURN TO WORK / SCHOOL 
 
1/28/2020 12:45 PM 
 
Ms. Sandra Pack [address] [place] [address] [phone] To Whom It May Concern: 
 
Sandra Pack is currently under the care of [facility]. She will return to work/school on: January 30, 2020 If there are questions or concerns please have the patient contact our office. Sincerely, Flower Snowden NP

## 2020-01-28 NOTE — PATIENT INSTRUCTIONS
Upper Respiratory Infection (Cold): Care Instructions Your Care Instructions An upper respiratory infection, or URI, is an infection of the nose, sinuses, or throat. URIs are spread by coughs, sneezes, and direct contact. The common cold is the most frequent kind of URI. The flu and sinus infections are other kinds of URIs. Almost all URIs are caused by viruses. Antibiotics won't cure them. But you can treat most infections with home care. This may include drinking lots of fluids and taking over-the-counter pain medicine. You will probably feel better in 4 to 10 days. The doctor has checked you carefully, but problems can develop later. If you notice any problems or new symptoms, get medical treatment right away. Follow-up care is a key part of your treatment and safety. Be sure to make and go to all appointments, and call your doctor if you are having problems. It's also a good idea to know your test results and keep a list of the medicines you take. How can you care for yourself at home? · To prevent dehydration, drink plenty of fluids, enough so that your urine is light yellow or clear like water. Choose water and other caffeine-free clear liquids until you feel better. If you have kidney, heart, or liver disease and have to limit fluids, talk with your doctor before you increase the amount of fluids you drink. · Take an over-the-counter pain medicine, such as acetaminophen (Tylenol), ibuprofen (Advil, Motrin), or naproxen (Aleve). Read and follow all instructions on the label. · Before you use cough and cold medicines, check the label. These medicines may not be safe for young children or for people with certain health problems. · Be careful when taking over-the-counter cold or flu medicines and Tylenol at the same time. Many of these medicines have acetaminophen, which is Tylenol. Read the labels to make sure that you are not taking more than the recommended dose.  Too much acetaminophen (Tylenol) can be harmful. · Get plenty of rest. 
· Do not smoke or allow others to smoke around you. If you need help quitting, talk to your doctor about stop-smoking programs and medicines. These can increase your chances of quitting for good. When should you call for help? Call 911 anytime you think you may need emergency care. For example, call if: 
  · You have severe trouble breathing.  
 Call your doctor now or seek immediate medical care if: 
  · You seem to be getting much sicker.  
  · You have new or worse trouble breathing.  
  · You have a new or higher fever.  
  · You have a new rash.  
 Watch closely for changes in your health, and be sure to contact your doctor if: 
  · You have a new symptom, such as a sore throat, an earache, or sinus pain.  
  · You cough more deeply or more often, especially if you notice more mucus or a change in the color of your mucus.  
  · You do not get better as expected. Where can you learn more? Go to http://ac-vianey.info/. Enter R011 in the search box to learn more about \"Upper Respiratory Infection (Cold): Care Instructions. \" Current as of: June 9, 2019 Content Version: 12.2 © 1316-6483 [employer]. Care instructions adapted under license by [employer] (which disclaims liability or warranty for this information). If you have questions about a medical condition or this instruction, always ask your healthcare professional. [place] [address] any warranty or liability for your use of this information. Bronchitis: Care Instructions Your Care Instructions Bronchitis is inflammation of the bronchial tubes, which carry air to the lungs. The tubes swell and produce mucus, or phlegm. The mucus and inflamed bronchial tubes make you cough. You may have trouble breathing. Most cases of bronchitis are caused by viruses like those that cause colds.  Antibiotics usually do not help and they may be harmful. Bronchitis usually develops rapidly and lasts about 2 to 3 weeks in otherwise healthy people. Follow-up care is a key part of your treatment and safety. Be sure to make and go to all appointments, and call your doctor if you are having problems. It's also a good idea to know your test results and keep a list of the medicines you take. How can you care for yourself at home? · Take all medicines exactly as prescribed. Call your doctor if you think you are having a problem with your medicine. · Get some extra rest. 
· Take an over-the-counter pain medicine, such as acetaminophen (Tylenol), ibuprofen (Advil, Motrin), or naproxen (Aleve) to reduce fever and relieve body aches. Read and follow all instructions on the label. · Do not take two or more pain medicines at the same time unless the doctor told you to. Many pain medicines have acetaminophen, which is Tylenol. Too much acetaminophen (Tylenol) can be harmful. · Take an over-the-counter cough medicine that contains dextromethorphan to help quiet a dry, hacking cough so that you can sleep. Avoid cough medicines that have more than one active ingredient. Read and follow all instructions on the label. · Breathe moist air from a humidifier, hot shower, or sink filled with hot water. The heat and moisture will thin mucus so you can cough it out. · Do not smoke. Smoking can make bronchitis worse. If you need help quitting, talk to your doctor about stop-smoking programs and medicines. These can increase your chances of quitting for good. When should you call for help? Call 911 anytime you think you may need emergency care.  For example, call if: 
  · You have severe trouble breathing.  
 Call your doctor now or seek immediate medical care if: 
  · You have new or worse trouble breathing.  
  · You cough up dark brown or bloody mucus (sputum).  
  · You have a new or higher fever.  
  · You have a new rash.  
 Watch closely for changes in your health, and be sure to contact your doctor if: 
  · You cough more deeply or more often, especially if you notice more mucus or a change in the color of your mucus.  
  · You are not getting better as expected. Where can you learn more? Go to http://ac-vianey.info/. Enter H333 in the search box to learn more about \"Bronchitis: Care Instructions. \" Current as of: June 9, 2019 Content Version: 12.2 © 9130-0913 [employer]. Care instructions adapted under license by [employer] (which disclaims liability or warranty for this information). If you have questions about a medical condition or this instruction, always ask your healthcare professional. [address] any warranty or liability for your use of this information.

## 2020-01-28 NOTE — PROGRESS NOTES
HISTORY OF PRESENT ILLNESS  Jeanie Manriquez is a 75 y.o. female presents for acute care   HPI  Was seen here on 1/6 . 12/30 and 11/22 treated for URI and bronchitis   ST better     Eyes watery    Post nasal drainage     Chest congestion    productive cough    Was better after Prednisone taper    Symptoms slowly recurring      No sweets or alcohol     Cardiology will start injection for cholesterol     Statin intolerant     Past Medical History:   Diagnosis Date    ADD (attention deficit disorder)     Anxiety and depression     DDD (degenerative disc disease), lumbar     dr Slime Chang Diet[facility] 2 [facility]     Epicondylitis, lateral     Fibromyalgia     Hx of diabetes mellitus     Hyperlipidemia LDL goal < 100     Hypertension     OA (osteoarthritis)     Rotator cuff rupture     Tobacco abuse        Allergies   Allergen Reactions    Codeine Other (comments)     Severe headaches    Erythromycin Rash    Morphine Hives     No longer an allergy per patient     Neomycin Rash       Current Outpatient Medications   Medication Sig    amoxicillin-clavulanate (AUGMENTIN) 875-125 mg per tablet Take 1 Tab by mouth two (2) times a day for 10 days.  predniSONE (DELTASONE) 10 mg tablet Taper daily. 60mg x1, 50mg x 1, 40mg x 1, 30mg x 1, 20mg x 1, 10mg x 1    escitalopram oxalate (LEXAPRO) 20 mg tablet TAKE 2 TABLETS BY MOUTH ONCE DAILY    hydroCHLOROthiazide (HYDRODIURIL) 25 mg tablet Take 0.5 Tabs by mouth daily.     [START ON 2/29/2020] dextroamphetamine-amphetamine (ADDERALL) 10 mg tablet take 3 tablets by mouth once daily    dextroamphetamine-amphetamine (ADDERALL) 10 mg tablet take 3 tablets by mouth once daily  Indications: Attention Deficit Disorder with Hyperactivity    dextroamphetamine-amphetamine (ADDERALL) 10 mg tablet take 3 tablets by mouth once daily    montelukast (SINGULAIR) 10 mg tablet TAKE 1 TABLET BY MOUTH DAILY    albuterol (PROVENTIL VENTOLIN) 2.5 mg /3 mL (0.083 %) nebu 3 mL by Nebulization route every four (4) hours as needed for Cough (wheezing, SOB).  albuterol (PROVENTIL HFA, VENTOLIN HFA, PROAIR HFA) 90 mcg/actuation inhaler Take 2 Puffs by inhalation every four (4) hours as needed for Wheezing or Shortness of Breath.  dextroamphetamine-amphetamine (ADDERALL) 10 mg tablet Take 10mg as directed. May take up to 3 tablets by mouth in 24hr period.  ezetimibe (ZETIA) 10 mg tablet Take 1 Tab by mouth daily.  metoprolol succinate (TOPROL-XL) 50 mg XL tablet Take 1 Tab by mouth daily.  beclomethasone (QVAR) 80 mcg/actuation aero Take 1 Puff by inhalation two (2) times a day.  cetirizine (ZYRTEC) 10 mg tablet Take 1 Tab by mouth daily.  azelastine (ASTELIN) 137 mcg (0.1 %) nasal spray 2 Sprays by Both Nostrils route two (2) times daily as needed for Rhinitis (allergies).  prasugrel (EFFIENT) 10 mg tablet take 1 tablet by mouth once daily    atorvastatin (LIPITOR) 40 mg tablet Take 1 Tab by mouth daily.  metFORMIN ER (GLUCOPHAGE XR) 500 mg tablet Take 1 Tab by mouth daily (with dinner). No current facility-administered medications for this visit. Review of Systems   Constitutional: Positive for malaise/fatigue. Negative for chills and fever. Eyes: Negative. Respiratory: Negative. Cardiovascular: Negative. Gastrointestinal: Negative. Genitourinary: Negative. Musculoskeletal: Negative. Neurological: Negative. Psychiatric/Behavioral: Negative. Visit Vitals  /71   Pulse 79   Temp 98 °F (36.7 °C) (Oral)   Resp 16   Ht 5' 2\" (1.575 m)   Wt 185 lb (83.9 kg)   SpO2 96%   BMI 33.84 kg/m²     Physical Exam  Constitutional:       General: She is not in acute distress. Appearance: She is not ill-appearing. HENT:      Nose: No congestion or rhinorrhea. Cardiovascular:      Rate and Rhythm: Normal rate and regular rhythm. Heart sounds: Normal heart sounds.    Pulmonary:      Effort: Pulmonary effort is normal. No respiratory distress. Breath sounds: Normal breath sounds. No wheezing. Lymphadenopathy:      Cervical: No cervical adenopathy. Skin:     General: Skin is warm and dry. Neurological:      Mental Status: She is alert. Psychiatric:         Mood and Affect: Mood is anxious. ASSESSMENT and PLAN    ICD-10-CM ICD-9-CM    1. Upper respiratory tract infection, unspecified type J06.9 465.9 amoxicillin-clavulanate (AUGMENTIN) 875-125 mg per tablet      predniSONE (DELTASONE) 10 mg tablet   2. Severe obesity ([address] E66.01 278.01    3. Wheezing R06.2 786.07 amoxicillin-clavulanate (AUGMENTIN) 875-125 mg per tablet      predniSONE (DELTASONE) 10 mg tablet   4. Mild intermittent asthmatic bronchitis with acute exacerbation J45.21 493.92 amoxicillin-clavulanate (AUGMENTIN) 875-125 mg per tablet      predniSONE (DELTASONE) 10 mg tablet     Follow-up and Dispositions    · Return if symptoms worsen or fail to improve. Reviewed supportive care measures, indications for call back/follow up  lab results and schedule of future lab studies reviewed with patient  reviewed medications and side effects in detail      Patient voices understanding and acceptance of this advice and will call back if any further questions or concerns. An After Visit Summary was printed and given to the patient.

## 2020-01-28 NOTE — PROGRESS NOTES
RM 7    Chief Complaint   Patient presents with    Cold Symptoms     coughing, sore throat, chest congestion. pt states she missed work friday and monday d/t symptooms    Headache     face hurts    Excessive Sweating     1. Have you been to the ER, urgent care clinic since your last visit? Hospitalized since your last visit? No    2. Have you seen or consulted any other health care providers outside of the [address] [place] [address] since your last visit? Include any pap smears or colon screening. No    Health Maintenance Due   Topic Date Due    Hepatitis C Screening  1955    DTaP/Tdap/Td series (1 - Tdap) 11/20/1964    Shingrix Vaccine Age 50> (1 of 2) 11/20/2003    FOBT Q 1 YEAR AGE 50-75  03/19/2016    EYE EXAM RETINAL OR DILATED  03/19/2017    GLAUCOMA SCREENING Q2Y  11/20/2018    Bone Densitometry (Dexa) Screening  11/20/2018    Pneumococcal 65+ years (1 of 1 - PPSV23) 11/20/2018    BREAST CANCER SCRN MAMMOGRAM  02/03/2019    MICROALBUMIN Q1  01/28/2020     3 most recent PHQ Screens 1/28/2020   Little interest or pleasure in doing things Not at all   Feeling down, depressed, irritable, or hopeless Several days   Total Score PHQ 2 1   Trouble falling or staying asleep, or sleeping too much -   Feeling tired or having little energy -   Poor appetite, weight loss, or overeating -   Feeling bad about yourself - or that you are a failure or have let yourself or your family down -   Trouble concentrating on things such as school, work, reading, or watching TV -   Moving or speaking so slowly that other people could have noticed; or the opposite being so fidgety that others notice -   Thoughts of being better off dead, or hurting yourself in some way -   PHQ 9 Score -   How difficult have these problems made it for you to do your work, take care of your home and get along with others -     Fall Risk Assessment, last 12 mths 1/28/2020   Able to walk? Yes   Fall in past 12 months?  No       Learning Assessment 5/22/2019   PRIMARY LEARNER Patient   BARRIERS PRIMARY LEARNER NONE   PRIMARY LANGUAGE ENGLISH   LEARNER PREFERENCE PRIMARY READING     -     -   ANSWERED BY patient   RELATIONSHIP SELF

## 2020-02-10 ENCOUNTER — OFFICE VISIT (OUTPATIENT)
Dept: INTERNAL MEDICINE CLINIC | Age: 65
End: 2020-02-10

## 2020-02-10 VITALS
WEIGHT: 187 LBS | RESPIRATION RATE: 16 BRPM | HEART RATE: 110 BPM | OXYGEN SATURATION: 97 % | SYSTOLIC BLOOD PRESSURE: 138 MMHG | HEIGHT: 62 IN | TEMPERATURE: 98.6 F | BODY MASS INDEX: 34.41 KG/M2 | DIASTOLIC BLOOD PRESSURE: 82 MMHG

## 2020-02-10 DIAGNOSIS — J01.11 ACUTE RECURRENT FRONTAL SINUSITIS: Primary | ICD-10-CM

## 2020-02-10 DIAGNOSIS — R05.9 COUGH: ICD-10-CM

## 2020-02-10 DIAGNOSIS — R30.0 DYSURIA: ICD-10-CM

## 2020-02-10 LAB
BILIRUB UR QL STRIP: NORMAL
GLUCOSE UR-MCNC: NEGATIVE MG/DL
KETONES P FAST UR STRIP-MCNC: NORMAL MG/DL
PH UR STRIP: 6 [PH] (ref 4.6–8)
PROT UR QL STRIP: NORMAL
SP GR UR STRIP: 1.02 (ref 1–1.03)
UA UROBILINOGEN AMB POC: NORMAL (ref 0.2–1)
URINALYSIS CLARITY POC: CLEAR
URINALYSIS COLOR POC: YELLOW
URINE BLOOD POC: NORMAL
URINE LEUKOCYTES POC: NEGATIVE
URINE NITRITES POC: NEGATIVE

## 2020-02-10 RX ORDER — PREDNISONE 20 MG/1
TABLET ORAL
Qty: 15 TAB | Refills: 0 | Status: SHIPPED | OUTPATIENT
Start: 2020-02-10 | End: 2020-02-20

## 2020-02-10 RX ORDER — LEVOFLOXACIN 500 MG/1
500 TABLET, FILM COATED ORAL DAILY
Qty: 10 TAB | Refills: 0 | Status: SHIPPED | OUTPATIENT
Start: 2020-02-10 | End: 2020-02-20

## 2020-02-10 NOTE — PROGRESS NOTES
ACUTE VISIT     HPI:   Neda Berrios is a 75 y.o. female, she presents today for:     1/6/2020 - wheezing.   1/28/2020 - again for wheezing. Since Friday had sore throat and scalding feeling in mouth and throat. Now having sore throat. Has had a similar syndrome in 2007.    - using asteline nose spray since November. - using zyrtec since December. - using singulaire daily for a while. Also taking albuterol inhaler 6-7 times. Noticing a difference after using inhaler. Previously on adviar, currently on qvar. Stopped using in     Augmentin 1/28/2020  Cefdinir 12/30/2019  Prednisone 5 day taper 1/28/2020  prednisone 5 day taper 1/6/2020  Prednisone 5 day 12/30/2019    ROS: no fever but ++ frontal headache, congestion, now burning in mouth and throat    Medications used for acute illness: None    Current Outpatient Medications on File Prior to Visit   Medication Sig    DM/acetaminophen/doxylamine (VICKS NYQUIL NIGHTTIME RELIEF PO) Take  by mouth.  guaiFENesin (MUCINEX) 1,200 mg Ta12 ER tablet Take 1,200 mg by mouth two (2) times a day.  escitalopram oxalate (LEXAPRO) 20 mg tablet TAKE 2 TABLETS BY MOUTH ONCE DAILY    hydroCHLOROthiazide (HYDRODIURIL) 25 mg tablet Take 0.5 Tabs by mouth daily.  [START ON 2/29/2020] dextroamphetamine-amphetamine (ADDERALL) 10 mg tablet take 3 tablets by mouth once daily    dextroamphetamine-amphetamine (ADDERALL) 10 mg tablet take 3 tablets by mouth once daily  Indications: Attention Deficit Disorder with Hyperactivity    dextroamphetamine-amphetamine (ADDERALL) 10 mg tablet take 3 tablets by mouth once daily    montelukast (SINGULAIR) 10 mg tablet TAKE 1 TABLET BY MOUTH DAILY    albuterol (PROVENTIL VENTOLIN) 2.5 mg /3 mL (0.083 %) nebu 3 mL by Nebulization route every four (4) hours as needed for Cough (wheezing, SOB).     albuterol (PROVENTIL HFA, VENTOLIN HFA, PROAIR HFA) 90 mcg/actuation inhaler Take 2 Puffs by inhalation every four (4) hours as needed for Wheezing or Shortness of Breath.  dextroamphetamine-amphetamine (ADDERALL) 10 mg tablet Take 10mg as directed. May take up to 3 tablets by mouth in 24hr period.  ezetimibe (ZETIA) 10 mg tablet Take 1 Tab by mouth daily.  metoprolol succinate (TOPROL-XL) 50 mg XL tablet Take 1 Tab by mouth daily.  beclomethasone (QVAR) 80 mcg/actuation aero Take 1 Puff by inhalation two (2) times a day.  cetirizine (ZYRTEC) 10 mg tablet Take 1 Tab by mouth daily.  azelastine (ASTELIN) 137 mcg (0.1 %) nasal spray 2 Sprays by Both Nostrils route two (2) times daily as needed for Rhinitis (allergies).  prasugrel (EFFIENT) 10 mg tablet take 1 tablet by mouth once daily    atorvastatin (LIPITOR) 40 mg tablet Take 1 Tab by mouth daily.  metFORMIN ER (GLUCOPHAGE XR) 500 mg tablet Take 1 Tab by mouth daily (with dinner).  [DISCONTINUED] predniSONE (DELTASONE) 10 mg tablet Taper daily. 60mg x1, 50mg x 1, 40mg x 1, 30mg x 1, 20mg x 1, 10mg x 1     No current facility-administered medications on file prior to visit. Allergies   Allergen Reactions    Codeine Other (comments)     Severe headaches    Erythromycin Rash    Morphine Hives     No longer an allergy per patient     Neomycin Rash       PMH/PSH/FH: reviewed and updated    Sochx:   reports that she quit smoking about 3 years ago. Her smoking use included cigarettes. She smoked 0.00 packs per day for 45.00 years. She has never used smokeless tobacco. She reports previous alcohol use. She reports that she does not use drugs. PE:  Blood pressure 138/82, pulse (!) 110, temperature 98.6 °F (37 °C), temperature source Oral, resp. rate 16, height 5' 2\" (1.575 m), weight 187 lb (84.8 kg), SpO2 97 %. Body mass index is 34.2 kg/m². Physical Exam  Vitals signs and nursing note reviewed. Constitutional:       General: She is not in acute distress. Appearance: Normal appearance. She is normal weight.    HENT:      Head: Normocephalic and atraumatic. Right Ear: Tympanic membrane normal.      Left Ear: Tympanic membrane normal.      Nose: Nose normal.      Mouth/Throat:      Mouth: Mucous membranes are moist.   Eyes:      Extraocular Movements: Extraocular movements intact. Conjunctiva/sclera: Conjunctivae normal.      Pupils: Pupils are equal, round, and reactive to light. Neck:      Musculoskeletal: Normal range of motion and neck supple. Cardiovascular:      Rate and Rhythm: Normal rate and regular rhythm. Heart sounds: Normal heart sounds. Pulmonary:      Effort: Pulmonary effort is normal.      Breath sounds: No wheezing or rales. Musculoskeletal: Normal range of motion. Skin:     General: Skin is warm and dry. Neurological:      Mental Status: She is alert and oriented to person, place, and time. Labs:  No results found for any visits on 02/10/20. A/P  Yane Casondannie Lawrence was seen today for had concerns including Sore Throat (patient reports has taken 3 sets of antibiotics and steriods everytime antibiotics end symptoms return, wants to try leviquin reports patient ) and Sinus Infection. .  The diagnosis and plan was discussed including:        ICD-10-CM ICD-9-CM    1. Acute recurrent frontal sinusitis J01.11 461.1 CT SINUSES WO CONT      levoFLOXacin (LEVAQUIN) 500 mg tablet      predniSONE (DELTASONE) 20 mg tablet      REFERRAL TO ENT-OTOLARYNGOLOGY      CANCELED: REFERRAL TO ENT-OTOLARYNGOLOGY   2. Dysuria R30.0 788.1 AMB POC URINALYSIS DIP STICK AUTO W/O MICRO      CULTURE, URINE   3. Cough R05 786.2 XR CHEST PA LAT     Recurrent sinusitis symptoms. Recurrent use of steroids and abx. This is now 4th epsidose (see summary above). Resume steroid and abs, however will order CT of sinus to eval fo abscess, anatomical risk factors. Follow-up with ENT.        - I advised her to call back or return to office if symptoms worsen/change/persist.  - She was given AVS and expressed understanding with the diagnosis and plan as discussed.

## 2020-02-10 NOTE — PROGRESS NOTES
RM 2    Chief Complaint   Patient presents with    Sore Throat     patient reports has taken 3 sets of antibiotics and steriods everytime antibiotics end symptoms return, wants to try leviquin reports patient     Sinus Infection     1. Have you been to the ER, urgent care clinic since your last visit? Hospitalized since your last visit? No    2. Have you seen or consulted any other health care providers outside of the [address] [place] [address] since your last visit? Include any pap smears or colon screening.  No    Patient reports missing work frequently due to sickness     Health Maintenance Due   Topic Date Due    Hepatitis C Screening  1955    DTaP/Tdap/Td series (1 - Tdap) 11/20/1964    Shingrix Vaccine Age 50> (1 of 2) 11/20/2003    FOBT Q1Y Age 50-75  03/19/2016    Eye Exam Retinal or Dilated  03/19/2017    GLAUCOMA SCREENING Q2Y  11/20/2018    Bone Densitometry (Dexa) Screening  11/20/2018    Pneumococcal 65+ years (1 of 1 - PPSV23) 11/20/2018    Breast Cancer Screen Mammogram  02/03/2019    MICROALBUMIN Q1  01/28/2020       Learning Assessment 5/22/2019   PRIMARY LEARNER Patient   BARRIERS PRIMARY LEARNER NONE   PRIMARY LANGUAGE ENGLISH   LEARNER PREFERENCE PRIMARY READING     -     -   ANSWERED BY patient   RELATIONSHIP SELF

## 2020-02-10 NOTE — PATIENT INSTRUCTIONS
Please call scheduling department to arrange for testing at: [phone] 
  -sinus CT.  
 - chest x-ray does not need scheduling. Take antibiotic and steroid as prescribed for 10 days. Sinusitis: Care Instructions Your Care Instructions Sinusitis is an infection of the lining of the sinus cavities in your head. Sinusitis often follows a cold. It causes pain and pressure in your head and face. In most cases, sinusitis gets better on its own in 1 to 2 weeks. But some mild symptoms may last for several weeks. Sometimes antibiotics are needed. Follow-up care is a key part of your treatment and safety. Be sure to make and go to all appointments, and call your doctor if you are having problems. It's also a good idea to know your test results and keep a list of the medicines you take. How can you care for yourself at home? · Take an over-the-counter pain medicine, such as acetaminophen (Tylenol), ibuprofen (Advil, Motrin), or naproxen (Aleve). Read and follow all instructions on the label. · If the doctor prescribed antibiotics, take them as directed. Do not stop taking them just because you feel better. You need to take the full course of antibiotics. · Be careful when taking over-the-counter cold or flu medicines and Tylenol at the same time. Many of these medicines have acetaminophen, which is Tylenol. Read the labels to make sure that you are not taking more than the recommended dose. Too much acetaminophen (Tylenol) can be harmful. · Breathe warm, moist air from a steamy shower, a hot bath, or a sink filled with hot water. Avoid cold, dry air. Using a humidifier in your home may help. Follow the directions for cleaning the machine. · Use saline (saltwater) nasal washes to help keep your nasal passages open and wash out mucus and bacteria. You can buy saline nose drops at a grocery store or drugstore.  Or you can make your own at home by adding 1 teaspoon of salt and 1 teaspoon of baking soda to 2 cups of distilled water. If you make your own, fill a bulb syringe with the solution, insert the tip into your nostril, and squeeze gently. Aristides Lien your nose. · Put a hot, wet towel or a warm gel pack on your face 3 or 4 times a day for 5 to 10 minutes each time. · Try a decongestant nasal spray like oxymetazoline (Afrin). Do not use it for more than 3 days in a row. Using it for more than 3 days can make your congestion worse. When should you call for help? Call your doctor now or seek immediate medical care if: 
  · You have new or worse swelling or redness in your face or around your eyes.  
  · You have a new or higher fever.  
 Watch closely for changes in your health, and be sure to contact your doctor if: 
  · You have new or worse facial pain.  
  · The mucus from your nose becomes thicker (like pus) or has new blood in it.  
  · You are not getting better as expected. Where can you learn more? Go to http://ac-vianey.info/. Enter A557 in the search box to learn more about \"Sinusitis: Care Instructions. \" Current as of: October 21, 2018 Content Version: 12.2 © 1289-8952 [employer]. Care instructions adapted under license by [employer] (which disclaims liability or warranty for this information). If you have questions about a medical condition or this instruction, always ask your healthcare professional. [place] [address] any warranty or liability for your use of this information.

## 2020-02-10 NOTE — LETTER
NOTIFICATION RETURN TO WORK / SCHOOL 
 
2/10/2020 5:52 PM 
 
Ms. Karina Porter [address] [facility] [phone] To Whom It May Concern: 
 
Karina Porter is currently under the care of [facility]. She will return to work/school on: 2/12/2020 If there are questions or concerns please have the patient contact our office.  
 
 
 
Sincerely, 
 
 
Paz Sams MD

## 2020-02-12 LAB — BACTERIA UR CULT: NORMAL

## 2020-02-14 ENCOUNTER — OFFICE VISIT (OUTPATIENT)
Dept: INTERNAL MEDICINE CLINIC | Age: 65
End: 2020-02-14

## 2020-02-14 VITALS
RESPIRATION RATE: 16 BRPM | DIASTOLIC BLOOD PRESSURE: 87 MMHG | HEIGHT: 62 IN | OXYGEN SATURATION: 96 % | SYSTOLIC BLOOD PRESSURE: 152 MMHG | HEART RATE: 101 BPM | BODY MASS INDEX: 34.6 KG/M2 | TEMPERATURE: 97.8 F | WEIGHT: 188 LBS

## 2020-02-14 DIAGNOSIS — I10 HTN (HYPERTENSION), BENIGN: ICD-10-CM

## 2020-02-14 DIAGNOSIS — J45.21 MILD INTERMITTENT ASTHMATIC BRONCHITIS WITH ACUTE EXACERBATION: ICD-10-CM

## 2020-02-14 DIAGNOSIS — J01.11 ACUTE RECURRENT FRONTAL SINUSITIS: Primary | ICD-10-CM

## 2020-02-14 RX ORDER — ALBUTEROL SULFATE 0.83 MG/ML
2.5 SOLUTION RESPIRATORY (INHALATION)
Qty: 30 NEBULE | Refills: 1 | Status: SHIPPED | OUTPATIENT
Start: 2020-02-14 | End: 2020-08-03 | Stop reason: SDUPTHER

## 2020-02-14 NOTE — PROGRESS NOTES
RM 1    Patient reports has not been able to do CT and Chest X ray - nothing close to her home. Wants orders to go to imaging location closer to her home. Please advise. Chief Complaint   Patient presents with    Follow-up     sinus issues, reports doing a little better, reports still feels fatigued, Patient reports has been out of work this week to rest and get better      1. Have you been to the ER, urgent care clinic since your last visit? Hospitalized since your last visit? No    2. Have you seen or consulted any other health care providers outside of the [address] [place] [address] since your last visit? Include any pap smears or colon screening. No    Health Maintenance Due   Topic Date Due    Hepatitis C Screening  1955    DTaP/Tdap/Td series (1 - Tdap) 11/20/1964    Shingrix Vaccine Age 50> (1 of 2) 11/20/2003    FOBT Q1Y Age 50-75  03/19/2016    Eye Exam Retinal or Dilated  03/19/2017    GLAUCOMA SCREENING Q2Y  11/20/2018    Bone Densitometry (Dexa) Screening  11/20/2018    Pneumococcal 65+ years (1 of 1 - PPSV23) 11/20/2018    Breast Cancer Screen Mammogram  02/03/2019    MICROALBUMIN Q1  01/28/2020       Learning Assessment 5/22/2019   PRIMARY LEARNER Patient   BARRIERS PRIMARY LEARNER NONE   PRIMARY LANGUAGE ENGLISH   LEARNER PREFERENCE PRIMARY READING     -     -   ANSWERED BY patient   RELATIONSHIP SELF         AVS  education, follow up, and recommendations provided and addressed with patient.

## 2020-02-14 NOTE — PATIENT INSTRUCTIONS
1) Schedule with ENT, DO NOT wait for imaging results 2) when you get your imaging, request a disk of the results so that you can take the imaging with you to see the ENT. 3) don't stop blood pressure medications without talking to your doctor. Please restart Hydrochlorthiazide today.

## 2020-02-14 NOTE — PROGRESS NOTES
ACUTE VISIT     HPI:   Reddy Caal is a 75 y.o. female, she presents today for:     Patient with recurrent sinusiits. Seen earlier this week. Started on 4th round of steroid and 3rd round of antibiotics. Referred to ENT and CT of sinuses requested. Presents for early follow-up because: needs note for work, would like to get imaging at a different location. Reports she is ready to go back to work. - pressure is starting to improve. For blood ugar has been taking metformin, and     Reports that she ist takin gmetoprolol and lisinopril. Has been holding hydrochlorthiazide. Review of Systems   Constitutional: Positive for malaise/fatigue. Negative for chills and fever. HENT: Positive for congestion and sinus pain. Respiratory: Positive for cough. Negative for shortness of breath. Cardiovascular: Negative for chest pain. Gastrointestinal: Negative for abdominal pain, nausea and vomiting. Neurological: Positive for headaches. Medications used for acute illness: as prescribed    Current Outpatient Medications on File Prior to Visit   Medication Sig    DM/acetaminophen/doxylamine (VICKS NYQUIL NIGHTTIME RELIEF PO) Take  by mouth.  guaiFENesin (MUCINEX) 1,200 mg Ta12 ER tablet Take 1,200 mg by mouth two (2) times a day.  levoFLOXacin (LEVAQUIN) 500 mg tablet Take 1 Tab by mouth daily for 10 days.  predniSONE (DELTASONE) 20 mg tablet Take 40 mg by mouth daily (with breakfast) for 5 days, THEN 20 mg daily (with breakfast) for 5 days.  escitalopram oxalate (LEXAPRO) 20 mg tablet TAKE 2 TABLETS BY MOUTH ONCE DAILY    hydroCHLOROthiazide (HYDRODIURIL) 25 mg tablet Take 0.5 Tabs by mouth daily.     [START ON 2/29/2020] dextroamphetamine-amphetamine (ADDERALL) 10 mg tablet take 3 tablets by mouth once daily    dextroamphetamine-amphetamine (ADDERALL) 10 mg tablet take 3 tablets by mouth once daily  Indications: Attention Deficit Disorder with Hyperactivity    dextroamphetamine-amphetamine (ADDERALL) 10 mg tablet take 3 tablets by mouth once daily    montelukast (SINGULAIR) 10 mg tablet TAKE 1 TABLET BY MOUTH DAILY    albuterol (PROVENTIL VENTOLIN) 2.5 mg /3 mL (0.083 %) nebu 3 mL by Nebulization route every four (4) hours as needed for Cough (wheezing, SOB).  albuterol (PROVENTIL HFA, VENTOLIN HFA, PROAIR HFA) 90 mcg/actuation inhaler Take 2 Puffs by inhalation every four (4) hours as needed for Wheezing or Shortness of Breath.  dextroamphetamine-amphetamine (ADDERALL) 10 mg tablet Take 10mg as directed. May take up to 3 tablets by mouth in 24hr period.  ezetimibe (ZETIA) 10 mg tablet Take 1 Tab by mouth daily.  metoprolol succinate (TOPROL-XL) 50 mg XL tablet Take 1 Tab by mouth daily.  beclomethasone (QVAR) 80 mcg/actuation aero Take 1 Puff by inhalation two (2) times a day.  cetirizine (ZYRTEC) 10 mg tablet Take 1 Tab by mouth daily.  azelastine (ASTELIN) 137 mcg (0.1 %) nasal spray 2 Sprays by Both Nostrils route two (2) times daily as needed for Rhinitis (allergies).  prasugrel (EFFIENT) 10 mg tablet take 1 tablet by mouth once daily    metFORMIN ER (GLUCOPHAGE XR) 500 mg tablet Take 1 Tab by mouth daily (with dinner).  atorvastatin (LIPITOR) 40 mg tablet Take 1 Tab by mouth daily. No current facility-administered medications on file prior to visit. Allergies   Allergen Reactions    Codeine Other (comments)     Severe headaches    Erythromycin Rash    Morphine Hives     No longer an allergy per patient     Neomycin Rash       PMH/PSH/FH: reviewed and updated    Sochx:   reports that she quit smoking about 3 years ago. Her smoking use included cigarettes. She smoked 0.00 packs per day for 45.00 years. She has never used smokeless tobacco. She reports previous alcohol use. She reports that she does not use drugs. PE:  Blood pressure 152/87, pulse (!) 101, temperature 97.8 °F (36.6 °C), temperature source Oral, resp. rate 16, height 5' 2\" (1.575 m), weight 188 lb (85.3 kg), SpO2 96 %. Body mass index is 34.39 kg/m². Physical Exam  Vitals signs and nursing note reviewed. Constitutional:       General: She is not in acute distress. Appearance: Normal appearance. HENT:      Head: Normocephalic and atraumatic. Right Ear: Tympanic membrane normal.      Left Ear: Tympanic membrane normal.      Nose: Congestion present. Mouth/Throat:      Mouth: Mucous membranes are moist.   Eyes:      Extraocular Movements: Extraocular movements intact. Conjunctiva/sclera: Conjunctivae normal.      Pupils: Pupils are equal, round, and reactive to light. Neck:      Musculoskeletal: Normal range of motion and neck supple. Cardiovascular:      Rate and Rhythm: Normal rate and regular rhythm. Heart sounds: Normal heart sounds. Pulmonary:      Effort: Pulmonary effort is normal. No respiratory distress. Breath sounds: Normal breath sounds. No wheezing. Musculoskeletal: Normal range of motion. Right lower leg: No edema. Left lower leg: No edema. Skin:     General: Skin is warm and dry. Findings: No rash. Neurological:      Mental Status: She is alert and oriented to person, place, and time. Labs:  No results found for any visits on 02/14/20. A/P  Yane Charles was seen today for had concerns including Follow-up (sinus issues, reports doing a little better, reports still feels fatigued, Patient reports has been out of work this week to rest and get better ). .  The diagnosis and plan was discussed including:        ICD-10-CM ICD-9-CM    1. Acute recurrent frontal sinusitis J01.11 461.1    2. HTN (hypertension), benign I10 401.1    3. Mild intermittent asthmatic bronchitis with acute exacerbation J45.21 493.92 albuterol (PROVENTIL VENTOLIN) 2.5 mg /3 mL (0.083 %) nebu     Recovering from previously diagnosed sinusitis. Reinforced need to follow-up with ENT.    Provided copies of radiology requisitions again. Patient plans to get a local imaging center. Advised her to request a disk of images be made to take with her to ENT. Refilled albuteorl  Provided note for work, patient wishes to return to work after the weekend. BP not controlled, but patient had self discontinued her HCTZ. Advised to resume.       - I advised her to call back or return to office if symptoms worsen/change/persist.  - She was given AVS and expressed understanding with the diagnosis and plan as discussed. Follow-up and Dispositions    · Return if symptoms worsen or fail to improve.

## 2020-02-28 DIAGNOSIS — F32.A ANXIETY AND DEPRESSION: ICD-10-CM

## 2020-02-28 DIAGNOSIS — F41.9 ANXIETY AND DEPRESSION: ICD-10-CM

## 2020-02-28 RX ORDER — ESCITALOPRAM OXALATE 20 MG/1
TABLET ORAL
Qty: 60 TAB | Refills: 0 | Status: SHIPPED | OUTPATIENT
Start: 2020-02-28 | End: 2020-04-29

## 2020-04-29 DIAGNOSIS — J45.40 MODERATE PERSISTENT ASTHMA WITHOUT COMPLICATION: ICD-10-CM

## 2020-04-29 DIAGNOSIS — F41.9 ANXIETY AND DEPRESSION: ICD-10-CM

## 2020-04-29 DIAGNOSIS — F32.A ANXIETY AND DEPRESSION: ICD-10-CM

## 2020-04-29 RX ORDER — ESCITALOPRAM OXALATE 20 MG/1
TABLET ORAL
Qty: 60 TAB | Refills: 0 | Status: SHIPPED | OUTPATIENT
Start: 2020-04-29 | End: 2020-06-22

## 2020-04-30 RX ORDER — ALBUTEROL SULFATE 90 UG/1
AEROSOL, METERED RESPIRATORY (INHALATION)
Qty: 8.5 G | Refills: 3 | Status: SHIPPED | OUTPATIENT
Start: 2020-04-30 | End: 2020-08-03 | Stop reason: SDUPTHER

## 2020-05-15 DIAGNOSIS — F98.8 ATTENTION DEFICIT DISORDER (ADD) WITHOUT HYPERACTIVITY: ICD-10-CM

## 2020-05-19 RX ORDER — DEXTROAMPHETAMINE SACCHARATE, AMPHETAMINE ASPARTATE, DEXTROAMPHETAMINE SULFATE AND AMPHETAMINE SULFATE 2.5; 2.5; 2.5; 2.5 MG/1; MG/1; MG/1; MG/1
TABLET ORAL
Qty: 90 TAB | Refills: 0 | Status: SHIPPED | OUTPATIENT
Start: 2020-05-19 | End: 2020-06-23 | Stop reason: SDUPTHER

## 2020-06-22 DIAGNOSIS — F32.A ANXIETY AND DEPRESSION: ICD-10-CM

## 2020-06-22 DIAGNOSIS — F41.9 ANXIETY AND DEPRESSION: ICD-10-CM

## 2020-06-22 RX ORDER — ESCITALOPRAM OXALATE 20 MG/1
TABLET ORAL
Qty: 60 TAB | Refills: 0 | Status: SHIPPED | OUTPATIENT
Start: 2020-06-22 | End: 2020-08-03 | Stop reason: SDUPTHER

## 2020-06-23 ENCOUNTER — VIRTUAL VISIT (OUTPATIENT)
Dept: INTERNAL MEDICINE CLINIC | Age: 65
End: 2020-06-23

## 2020-06-23 DIAGNOSIS — E55.9 VITAMIN D DEFICIENCY: ICD-10-CM

## 2020-06-23 DIAGNOSIS — J06.9 UPPER RESPIRATORY TRACT INFECTION, UNSPECIFIED TYPE: ICD-10-CM

## 2020-06-23 DIAGNOSIS — F98.8 ATTENTION DEFICIT DISORDER (ADD) WITHOUT HYPERACTIVITY: ICD-10-CM

## 2020-06-23 DIAGNOSIS — E11.9 TYPE 2 DIABETES MELLITUS WITHOUT COMPLICATION, WITHOUT LONG-TERM CURRENT USE OF INSULIN (HCC): ICD-10-CM

## 2020-06-23 DIAGNOSIS — N32.89 BLADDER IRRITABILITY: Primary | ICD-10-CM

## 2020-06-23 DIAGNOSIS — I10 ESSENTIAL HYPERTENSION: ICD-10-CM

## 2020-06-23 DIAGNOSIS — I10 HTN (HYPERTENSION), BENIGN: ICD-10-CM

## 2020-06-23 DIAGNOSIS — E78.2 MIXED HYPERLIPIDEMIA: ICD-10-CM

## 2020-06-23 DIAGNOSIS — R35.0 URINARY FREQUENCY: ICD-10-CM

## 2020-06-23 RX ORDER — CIPROFLOXACIN 250 MG/1
250 TABLET, FILM COATED ORAL EVERY 12 HOURS
Qty: 14 TAB | Refills: 0 | Status: SHIPPED | OUTPATIENT
Start: 2020-06-23 | End: 2020-06-30

## 2020-06-23 NOTE — PROGRESS NOTES
Rogerio Giron is a 75 y.o. female who was seen by synchronous (real-time) audio-video technology on 6/23/2020. Consent: Rogerio Giron, who was seen by synchronous (real-time) audio-video technology, and/or her healthcare decision maker, is aware that this patient-initiated, Telehealth encounter on 6/23/2020 is a billable service, with coverage as determined by her insurance carrier. She is aware that she may receive a bill and has provided verbal consent to proceed: Yes. I was in the office while conducting this encounter. Subjective:   Rogerio Giron was seen for Urinary Frequency (started sat. x4 days. didnt have any symptoms sun, and monday. restarted this morning. frequency, scant amount of blood. 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 Past medical, Social, and Family history reviewed  Medications reviewed and updated. Allergies   Allergen Reactions    Codeine Other (comments)     Severe headaches    Erythromycin Rash    Morphine Hives     No longer an allergy per patient     Neomycin Rash       Prior to Admission medications    Medication Sig Start Date End Date Taking? Authorizing Provider   escitalopram oxalate (LEXAPRO) 20 mg tablet Take 2 tablets by mouth once daily 6/22/20  Yes Wei Sorenson NP   dextroamphetamine-amphetamine (ADDERALL) 10 mg tablet Take 10mg as directed. May take up to 3 tablets by mouth in 24hr period.  5/19/20  Yes Wei Sorenson NP   ProAir HFA 90 mcg/actuation inhaler inhale 2 puffs by mouth and INTO THE LUNGS every 4 hours if needed for wheezing or shortness of breath 4/30/20  Yes Karolina Hernandez, NP   albuterol (PROVENTIL VENTOLIN) 2.5 mg /3 mL (0.083 %) nebu 3 mL by Nebulization route every four (4) hours as needed for Cough (wheezing, SOB). 2/14/20  Yes Radha Hwang MD   guaiFENesin [facility]) 1,200 mg Ta12 ER tablet Take 1,200 mg by mouth two (2) times a day. Yes Provider, Historical   hydroCHLOROthiazide (HYDRODIURIL) 25 mg tablet Take 0.5 Tabs by mouth daily. 12/30/19  Yes Adrien Del Toro MD   montelukast (SINGULAIR) 10 mg tablet TAKE 1 TABLET BY MOUTH DAILY 11/25/19  Yes Chantal Lugo NP   ezetimibe (ZETIA) 10 mg tablet Take 1 Tab by mouth daily. 11/22/19  Yes Rossy Santos MD   metoprolol succinate (TOPROL-XL) 50 mg XL tablet Take 1 Tab by mouth daily. 11/22/19  Yes Rossy Santos MD   beclomethasone (QVAR) 80 mcg/actuation aero Take 1 Puff by inhalation two (2) times a day. 11/22/19  Yes Rossy Santos MD   cetirizine (ZYRTEC) 10 mg tablet Take 1 Tab by mouth daily. 11/22/19  Yes Rossy Santos MD   azelastine (ASTELIN) 137 mcg (0.1 %) nasal spray 2 Sprays by Both Nostrils route two (2) times daily as needed for Rhinitis (allergies). 11/22/19  Yes Rossy Santos MD   prasugrel (EFFIENT) 10 mg tablet take 1 tablet by mouth once daily 4/24/19  Yes Provider, Historical   atorvastatin (LIPITOR) 40 mg tablet Take 1 Tab by mouth daily. 1/28/19  Yes Chantal Lugo NP   metFORMIN ER (GLUCOPHAGE XR) 500 mg tablet Take 1 Tab by mouth daily (with dinner). 1/28/19  Yes Chantal Lugo NP   DM/acetaminophen/doxylamine (VICKS NYQUIL NIGHTTIME RELIEF PO) Take  by mouth. Provider, Historical         ROS     A complete ROS was performed and negative except as noted in HPI      PHYSICAL EXAMINATION:    Vital Signs: There were no vitals taken for this visit.       Constitutional: [x] Appears well-developed and well-nourished [x] No apparent distress      Mental status: [x] Alert and awake  [x] Oriented [x] Able to follow commands       Eyes:   EOM    [x]  Normal      Sclera  [x]  Normal              Discharge [x]  None visible       HENT: [x] Normocephalic, atraumatic [x] Mouth/Throat: Mucous membranes are moist    External Ears [x] Normal      Neck: [x] No visualized mass     Pulmonary/Chest: [x] Respiratory effort normal   [x] No visualized signs of difficulty breathing or respiratory distress    Musculoskeletal:  [x] Normal range of motion of neck    Neurological:        [x] No Facial Asymmetry (Cranial nerve 7 motor function) (limited exam due to video visit)          [x] No gaze palsy     Skin:        [x] No significant exanthematous lesions or discoloration noted on facial skin             Psychiatric:       [x] Normal Affect       Other pertinent observable physical exam findings:  None. We discussed the expected course, resolution and complications of the diagnosis(es) in detail. Medication risks, benefits, costs, interactions, and alternatives were discussed as indicated. I advised her to contact the office if her condition worsens, changes or fails to improve as anticipated. She expressed understanding with the diagnosis(es) and plan. Sincere Zhao is a 75 y.o. female who was evaluated by a video visit encounter for concerns as above. Patient identification was verified prior to start of the visit. A caregiver was present when appropriate. Due to this being a TeleHealth encounter (During [place] [address] public health emergency), evaluation of the following organ systems was limited: Vitals/Constitutional/EENT/Resp/CV/GI//MS/Neuro/Skin/Heme-Lymph-Imm. Pursuant to the emergency declaration under the [facility], 1135 waiver authority and the [employer] General Act, this Virtual  Visit was conducted, with patient's (and/or legal guardian's) consent, to reduce the patient's risk of exposure to COVID-19 and provide necessary medical care. Services were provided through a video synchronous discussion virtually to substitute for in-person clinic visit.          Assessment & Plan: Diagnoses and all orders for this visit:      ICD-10-CM ICD-9-CM    1. Bladder irritability N32.89 596.89 REFERRAL TO FEMALE PELVIC MEDICINE AND RECONSTRUCTIVE SURGERY      CULTURE, URINE      URINALYSIS W/ RFLX MICROSCOPIC   2. Urinary frequency R35.0 788.41 REFERRAL TO FEMALE PELVIC MEDICINE AND RECONSTRUCTIVE SURGERY      ciprofloxacin HCl (CIPRO) 250 mg tablet      CULTURE, URINE      URINALYSIS W/ RFLX MICROSCOPIC   3. HTN (hypertension), benign I10 401.1    4. Type 2 diabetes mellitus without complication, without long-term current use of insulin (HCC) E11.9 250.00 HEMOGLOBIN A1C WITH EAG   5. Upper respiratory tract infection, unspecified type J06.9 465.9    6. Essential hypertension I10 401.9 CBC WITH AUTOMATED DIFF   7. Mixed hyperlipidemia E78.2 272.2 CBC WITH AUTOMATED DIFF      LIPID PANEL      METABOLIC PANEL, COMPREHENSIVE   8. Vitamin D deficiency E55.9 268.9 VITAMIN D, 25 HYDROXY     Follow-up and Dispositions    · Return in about 1 month (around 7/23/2020), or if symptoms worsen or fail to improve, for diabetes, cholesterol - virtual.        results and schedule of future studies reviewed with patient  reviewed diet, exercise and weight   cardiovascular risk and specific lipid/LDL goals reviewed  reviewed medications and side effects in detail  Ref to urology  Check urine cx prior to abx - [facility] - [address]  Empiric abx - cipro bid x 7 days       AVS:  []  Sent to patient as StormMQhart message after visit. [x]  Mailed to patient after visit. []  Not sent to patient after visit.

## 2020-06-23 NOTE — PROGRESS NOTES
[phone]    Chief Complaint   Patient presents with    Urinary Frequency     started sat. x4 days. didnt have any symptoms sun, and monday. restarted this morning. frequency, scant amount of blood. lower stomach pain. 1. Have you been to the ER, urgent care clinic since your last visit? Hospitalized since your last visit? No    2. Have you seen or consulted any other health care providers outside of the [address] [place] [address] since your last visit? Include any pap smears or colon screening.  No     Health Maintenance Due   Topic Date Due    Hepatitis C Screening  1955    DTaP/Tdap/Td series (1 - Tdap) 11/20/1974    Shingrix Vaccine Age 50> (1 of 2) 11/20/2003    FOBT Q1Y Age 50-75  03/19/2016    Eye Exam Retinal or Dilated  03/19/2017    GLAUCOMA SCREENING Q2Y  11/20/2018    Bone Densitometry (Dexa) Screening  11/20/2018    Pneumococcal 65+ years (1 of 1 - PPSV23) 11/20/2018    Breast Cancer Screen Mammogram  02/03/2019    MICROALBUMIN Q1  01/28/2020    Foot Exam Q1  05/22/2020     3 most recent PHQ Screens 6/23/2020   Little interest or pleasure in doing things Not at all   Feeling down, depressed, irritable, or hopeless More than half the days   Total Score PHQ 2 2   Trouble falling or staying asleep, or sleeping too much -   Feeling tired or having little energy -   Poor appetite, weight loss, or overeating -   Feeling bad about yourself - or that you are a failure or have let yourself or your family down -   Trouble concentrating on things such as school, work, reading, or watching TV -   Moving or speaking so slowly that other people could have noticed; or the opposite being so fidgety that others notice -   Thoughts of being better off dead, or hurting yourself in some way -   PHQ 9 Score -   How difficult have these problems made it for you to do your work, take care of your home and get along with others -     Recent Travel Screening and Travel History documentation     Travel Screening       Question Response     In the last month, have you been in contact with someone who was confirmed or suspected to have Coronavirus / COVID-19? Yes     Do you have any of the following symptoms? Abdominal pain     Have you traveled internationally in the last month?  No      Travel History   Travel since 05/23/20     No documented travel since 05/23/20

## 2020-06-25 RX ORDER — METFORMIN HYDROCHLORIDE 500 MG/1
1000 TABLET, EXTENDED RELEASE ORAL
Qty: 180 TAB | Refills: 1 | Status: SHIPPED | OUTPATIENT
Start: 2020-06-25

## 2020-06-25 NOTE — PROGRESS NOTES
Please notify pt of results    Urine testing is consistent with an infection, but the culture results are pending  She should continue with the cipro and we will follow up once the culture is reported. HgbA1C at 7.8 is not at goal of 6.5 or better. She should resume and/or increase the metformin to 1000 mg per day. New Rx sent to pharmacy. LDL is very high = familial hyperlipidemia. This along with diabetes and failure of all statins should qualify her for Repatha. We should start the process of getting authorization for Repatha (contact the [address] [place] [address]  - [facility]- [phone] or [facility] [phone]) if pt is willing to do it - injection every 2-4 weeks. Vitamin D is low - increase daily vitamin D by 1000 units per day.

## 2020-06-26 LAB
25(OH)D3+25(OH)D2 SERPL-MCNC: 27.4 NG/ML (ref 30–100)
ALBUMIN SERPL-MCNC: 4.3 G/DL (ref 3.8–4.8)
ALBUMIN/GLOB SERPL: 1.8 {RATIO} (ref 1.2–2.2)
ALP SERPL-CCNC: 131 IU/L (ref 39–117)
ALT SERPL-CCNC: 26 IU/L (ref 0–32)
APPEARANCE UR: CLEAR
AST SERPL-CCNC: 23 IU/L (ref 0–40)
BACTERIA #/AREA URNS HPF: ABNORMAL /[HPF]
BACTERIA UR CULT: ABNORMAL
BASOPHILS # BLD AUTO: 0.1 X10E3/UL (ref 0–0.2)
BASOPHILS NFR BLD AUTO: 1 %
BILIRUB SERPL-MCNC: <0.2 MG/DL (ref 0–1.2)
BILIRUB UR QL STRIP: NEGATIVE
BUN SERPL-MCNC: 10 MG/DL (ref 8–27)
BUN/CREAT SERPL: 15 (ref 12–28)
CALCIUM SERPL-MCNC: 9.8 MG/DL (ref 8.7–10.3)
CASTS URNS QL MICRO: ABNORMAL /LPF
CHLORIDE SERPL-SCNC: 100 MMOL/L (ref 96–106)
CHOLEST SERPL-MCNC: 273 MG/DL (ref 100–199)
CO2 SERPL-SCNC: 22 MMOL/L (ref 20–29)
COLOR UR: YELLOW
COMMENT, 011824: ABNORMAL
CREAT SERPL-MCNC: 0.65 MG/DL (ref 0.57–1)
EOSINOPHIL # BLD AUTO: 0.3 X10E3/UL (ref 0–0.4)
EOSINOPHIL NFR BLD AUTO: 3 %
EPI CELLS #/AREA URNS HPF: ABNORMAL /HPF (ref 0–10)
ERYTHROCYTE [DISTWIDTH] IN BLOOD BY AUTOMATED COUNT: 12.3 % (ref 11.7–15.4)
EST. AVERAGE GLUCOSE BLD GHB EST-MCNC: 177 MG/DL
GLOBULIN SER CALC-MCNC: 2.4 G/DL (ref 1.5–4.5)
GLUCOSE SERPL-MCNC: 161 MG/DL (ref 65–99)
GLUCOSE UR QL: NEGATIVE
HBA1C MFR BLD: 7.8 % (ref 4.8–5.6)
HCT VFR BLD AUTO: 40.4 % (ref 34–46.6)
HDLC SERPL-MCNC: 46 MG/DL
HGB BLD-MCNC: 13.6 G/DL (ref 11.1–15.9)
HGB UR QL STRIP: ABNORMAL
IMM GRANULOCYTES # BLD AUTO: 0 X10E3/UL (ref 0–0.1)
IMM GRANULOCYTES NFR BLD AUTO: 0 %
KETONES UR QL STRIP: NEGATIVE
LDLC SERPL CALC-MCNC: 193 MG/DL (ref 0–99)
LEUKOCYTE ESTERASE UR QL STRIP: ABNORMAL
LYMPHOCYTES # BLD AUTO: 2.4 X10E3/UL (ref 0.7–3.1)
LYMPHOCYTES NFR BLD AUTO: 24 %
MCH RBC QN AUTO: 29.8 PG (ref 26.6–33)
MCHC RBC AUTO-ENTMCNC: 33.7 G/DL (ref 31.5–35.7)
MCV RBC AUTO: 88 FL (ref 79–97)
MICRO URNS: ABNORMAL
MONOCYTES # BLD AUTO: 0.6 X10E3/UL (ref 0.1–0.9)
MONOCYTES NFR BLD AUTO: 6 %
MUCOUS THREADS URNS QL MICRO: PRESENT
NEUTROPHILS # BLD AUTO: 6.6 X10E3/UL (ref 1.4–7)
NEUTROPHILS NFR BLD AUTO: 66 %
NITRITE UR QL STRIP: POSITIVE
PH UR STRIP: 6 [PH] (ref 5–7.5)
PLATELET # BLD AUTO: 348 X10E3/UL (ref 150–450)
POTASSIUM SERPL-SCNC: 4 MMOL/L (ref 3.5–5.2)
PROT SERPL-MCNC: 6.7 G/DL (ref 6–8.5)
PROT UR QL STRIP: ABNORMAL
RBC # BLD AUTO: 4.57 X10E6/UL (ref 3.77–5.28)
RBC #/AREA URNS HPF: >30 /HPF (ref 0–2)
SODIUM SERPL-SCNC: 141 MMOL/L (ref 134–144)
SP GR UR: 1.02 (ref 1–1.03)
TRIGL SERPL-MCNC: 168 MG/DL (ref 0–149)
UROBILINOGEN UR STRIP-MCNC: 1 MG/DL (ref 0.2–1)
VLDLC SERPL CALC-MCNC: 34 MG/DL (ref 5–40)
WBC # BLD AUTO: 10.1 X10E3/UL (ref 3.4–10.8)
WBC #/AREA URNS HPF: >30 /HPF (ref 0–5)

## 2020-06-26 RX ORDER — DEXTROAMPHETAMINE SACCHARATE, AMPHETAMINE ASPARTATE, DEXTROAMPHETAMINE SULFATE AND AMPHETAMINE SULFATE 2.5; 2.5; 2.5; 2.5 MG/1; MG/1; MG/1; MG/1
TABLET ORAL
Qty: 90 TAB | Refills: 0 | Status: SHIPPED | OUTPATIENT
Start: 2020-06-26 | End: 2021-01-29

## 2020-06-26 NOTE — PROGRESS NOTES
Please notify pt of results    The urine culture confirmed UTI with E.Coli which is susceptible to cipro  Continue current medications   Look into PA for Repatha

## 2020-08-03 ENCOUNTER — VIRTUAL VISIT (OUTPATIENT)
Dept: INTERNAL MEDICINE CLINIC | Age: 65
End: 2020-08-03
Payer: COMMERCIAL

## 2020-08-03 DIAGNOSIS — F32.A ANXIETY AND DEPRESSION: ICD-10-CM

## 2020-08-03 DIAGNOSIS — F41.9 ANXIETY AND DEPRESSION: ICD-10-CM

## 2020-08-03 DIAGNOSIS — I10 ESSENTIAL HYPERTENSION: ICD-10-CM

## 2020-08-03 DIAGNOSIS — F98.8 ATTENTION DEFICIT DISORDER (ADD) WITHOUT HYPERACTIVITY: ICD-10-CM

## 2020-08-03 DIAGNOSIS — J45.40 MODERATE PERSISTENT ASTHMA WITHOUT COMPLICATION: ICD-10-CM

## 2020-08-03 DIAGNOSIS — J45.41 MODERATE PERSISTENT ASTHMATIC BRONCHITIS WITH ACUTE EXACERBATION: Primary | ICD-10-CM

## 2020-08-03 DIAGNOSIS — I10 HTN (HYPERTENSION), BENIGN: ICD-10-CM

## 2020-08-03 DIAGNOSIS — E78.2 MIXED HYPERLIPIDEMIA: ICD-10-CM

## 2020-08-03 PROCEDURE — 99214 OFFICE O/P EST MOD 30 MIN: CPT | Performed by: INTERNAL MEDICINE

## 2020-08-03 RX ORDER — FLUTICASONE PROPIONATE AND SALMETEROL 250; 50 UG/1; UG/1
1 POWDER RESPIRATORY (INHALATION) 2 TIMES DAILY
Qty: 1 INHALER | Refills: 5 | Status: SHIPPED | OUTPATIENT
Start: 2020-08-03 | End: 2020-12-02 | Stop reason: SDUPTHER

## 2020-08-03 RX ORDER — ALBUTEROL SULFATE 90 UG/1
2 AEROSOL, METERED RESPIRATORY (INHALATION)
Qty: 8.5 G | Refills: 3 | Status: SHIPPED | OUTPATIENT
Start: 2020-08-03 | End: 2020-12-02 | Stop reason: SDUPTHER

## 2020-08-03 RX ORDER — DEXTROAMPHETAMINE SACCHARATE, AMPHETAMINE ASPARTATE, DEXTROAMPHETAMINE SULFATE AND AMPHETAMINE SULFATE 2.5; 2.5; 2.5; 2.5 MG/1; MG/1; MG/1; MG/1
10 TABLET ORAL
Qty: 90 TAB | Refills: 0 | Status: SHIPPED | OUTPATIENT
Start: 2020-08-03 | End: 2020-12-02 | Stop reason: SDUPTHER

## 2020-08-03 RX ORDER — ESCITALOPRAM OXALATE 20 MG/1
TABLET ORAL
Qty: 60 TAB | Refills: 5 | Status: SHIPPED | OUTPATIENT
Start: 2020-08-03 | End: 2021-03-16

## 2020-08-03 RX ORDER — PREDNISONE 20 MG/1
40 TABLET ORAL DAILY
Qty: 10 TAB | Refills: 0 | Status: SHIPPED | OUTPATIENT
Start: 2020-08-03 | End: 2020-08-08

## 2020-08-03 RX ORDER — ALBUTEROL SULFATE 0.83 MG/ML
2.5 SOLUTION RESPIRATORY (INHALATION)
Qty: 30 NEBULE | Refills: 2 | Status: SHIPPED | OUTPATIENT
Start: 2020-08-03 | End: 2021-12-23 | Stop reason: SDUPTHER

## 2020-08-03 RX ORDER — DEXTROAMPHETAMINE SACCHARATE, AMPHETAMINE ASPARTATE, DEXTROAMPHETAMINE SULFATE AND AMPHETAMINE SULFATE 2.5; 2.5; 2.5; 2.5 MG/1; MG/1; MG/1; MG/1
10 TABLET ORAL
Qty: 90 TAB | Refills: 0 | Status: SHIPPED | OUTPATIENT
Start: 2020-10-02 | End: 2020-12-02 | Stop reason: SDUPTHER

## 2020-08-03 RX ORDER — DEXTROAMPHETAMINE SACCHARATE, AMPHETAMINE ASPARTATE, DEXTROAMPHETAMINE SULFATE AND AMPHETAMINE SULFATE 2.5; 2.5; 2.5; 2.5 MG/1; MG/1; MG/1; MG/1
10 TABLET ORAL
Qty: 90 TAB | Refills: 0 | Status: SHIPPED | OUTPATIENT
Start: 2020-09-02 | End: 2020-12-02 | Stop reason: SDUPTHER

## 2020-08-03 NOTE — PROGRESS NOTES
Hima Vincent is a 75 y.o. female who was seen by synchronous (real-time) audio-video technology on 8/3/2020. Consent: Hima Vincent, who was seen by synchronous (real-time) audio-video technology, and/or her healthcare decision maker, is aware that this patient-initiated, Telehealth encounter on 8/3/2020 is a billable service, with coverage as determined by her insurance carrier. She is aware that she may receive a bill and has provided verbal consent to proceed: Yes. I was in the office while conducting this encounter. Subjective:   Hima Vincent was seen for Asthma (cough asthma flair up.  wheezing x1 week. ) and Attention Deficit Disorder (med f/u )      Notes:  Cough and asthma exac   +wheezing  Pt reports new  of singulair - expresses concern that med could be related    Some yard work recently. No using Qvar    UTI sx resolved. Nursing screenings reviewed by provider at visit. Past medical, Social, and Family history reviewed  Medications reviewed and updated. Allergies   Allergen Reactions    Codeine Other (comments)     Severe headaches    Erythromycin Rash    Morphine Hives     No longer an allergy per patient     Neomycin Rash       Prior to Admission medications    Medication Sig Start Date End Date Taking? Authorizing Provider   albuterol (PROVENTIL VENTOLIN) 2.5 mg /3 mL (0.083 %) nebu 3 mL by Nebulization route every four (4) hours as needed for Cough (wheezing, SOB). 8/3/20  Yes Kerry Mccracken MD   albuterol (ProAir HFA) 90 mcg/actuation inhaler Take 2 Puffs by inhalation every four (4) hours as needed for Wheezing. 8/3/20  Yes Kerry Mccracken MD   escitalopram oxalate (LEXAPRO) 20 mg tablet Take 2 tablets by mouth once daily 8/3/20  Yes Kerry Mccracken MD   fluticasone propion-salmeteroL (ADVAIR/WIXELA) 250-50 mcg/dose diskus inhaler Take 1 Puff by inhalation two (2) times a day.  8/3/20  Yes Kerry Mccracken MD dextroamphetamine-amphetamine (ADDERALL) 10 mg tablet Take 1 Tab by mouth three (3) times daily as needed (ADHD). Max Daily Amount: 30 mg. Indications: attention deficit disorder with hyperactivity 10/2/20  Yes Alfa Young MD   dextroamphetamine-amphetamine (ADDERALL) 10 mg tablet Take 1 Tab by mouth three (3) times daily as needed (ADHD). Max Daily Amount: 30 mg. 9/2/20  Yes Alfa Young MD   dextroamphetamine-amphetamine (ADDERALL) 10 mg tablet Take 1 Tab by mouth three (3) times daily as needed (ADHD). Max Daily Amount: 30 mg. 8/3/20  Yes Alfa Young MD   predniSONE (DELTASONE) 20 mg tablet Take 40 mg by mouth daily for 5 days. 8/3/20 8/8/20 Yes Alfa Young MD   dextroamphetamine-amphetamine (ADDERALL) 10 mg tablet Take 10mg as directed. May take up to 3 tablets by mouth in 24hr period. 6/26/20  Yes Parminder Altamirano NP   metFORMIN ER (GLUCOPHAGE XR) 500 mg tablet Take 2 Tabs by mouth daily (with dinner). 6/25/20  Yes Alfa Young MD   guaiFENesin (MUCINEX) 1,200 mg Ta12 ER tablet Take 1,200 mg by mouth two (2) times a day. Yes Provider, Historical   hydroCHLOROthiazide (HYDRODIURIL) 25 mg tablet Take 0.5 Tabs by mouth daily. 12/30/19  Yes Alfa Young MD   montelukast (SINGULAIR) 10 mg tablet TAKE 1 TABLET BY MOUTH DAILY 11/25/19  Yes Parminder Altamirano NP   ezetimibe (ZETIA) 10 mg tablet Take 1 Tab by mouth daily. 11/22/19  Yes Diana Jackson MD   metoprolol succinate (TOPROL-XL) 50 mg XL tablet Take 1 Tab by mouth daily. 11/22/19  Yes Diana Jackson MD   beclomethasone (QVAR) 80 mcg/actuation aero Take 1 Puff by inhalation two (2) times a day. 11/22/19  Yes Diana Jackson MD   cetirizine (ZYRTEC) 10 mg tablet Take 1 Tab by mouth daily. 11/22/19  Yes Diana Jackson MD   azelastine (ASTELIN) 137 mcg (0.1 %) nasal spray 2 Sprays by Both Nostrils route two (2) times daily as needed for Rhinitis (allergies).  11/22/19  Yes Diana Jackson MD prasugrel (EFFIENT) 10 mg tablet take 1 tablet by mouth once daily 4/24/19  Yes Provider, Historical   atorvastatin (LIPITOR) 40 mg tablet Take 1 Tab by mouth daily. 1/28/19  Yes Denzel Shaikh NP   DM/acetaminophen/doxylamine (VICKS NYQUIL NIGHTTIME RELIEF PO) Take  by mouth. Provider, Historical         ROS     A complete ROS was performed and negative except as noted in HPI    PHYSICAL EXAMINATION:    Vital Signs: There were no vitals taken for this visit. No flowsheet data found. Constitutional: [x] Appears well-developed and well-nourished [x] No apparent distress      Mental status: [x] Alert and awake  [x] Oriented [x] Able to follow commands       Eyes:   EOM    [x]  Normal      Sclera  [x]  Normal              Discharge [x]  None visible       HENT: [x] Normocephalic, atraumatic    [x] Mouth/Throat: Mucous membranes are moist    External Ears [x] Normal      Neck: [x] No visualized mass     Pulmonary/Chest: [x] Respiratory effort normal   [x] No visualized signs of difficulty breathing or respiratory distress    Musculoskeletal:  [x] Normal range of motion of neck    Neurological:        [x] No Facial Asymmetry (Cranial nerve 7 motor function) (limited exam due to video visit)          [x] No gaze palsy     Skin:        [x] No significant exanthematous lesions or discoloration noted on facial skin             Psychiatric:       [x] Normal Affect       Other pertinent observable physical exam findings:  None. We discussed the expected course, resolution and complications of the diagnosis(es) in detail. Medication risks, benefits, costs, interactions, and alternatives were discussed as indicated. I advised her to contact the office if her condition worsens, changes or fails to improve as anticipated. She expressed understanding with the diagnosis(es) and plan. Kelsey Gutierrez is a 75 y.o. female who was evaluated by a video visit encounter for concerns as above.  Patient identification was verified prior to start of the visit. A caregiver was present when appropriate. Due to this being a TeleHealth encounter (During YCB-71 public health emergency), evaluation of the following organ systems was limited: Vitals/Constitutional/EENT/Resp/CV/GI//MS/Neuro/Skin/Heme-Lymph-Imm. Pursuant to the emergency declaration under the [address] [place] [address] [facility] authority and the [employer] Act, this Virtual  Visit was conducted, with patient's (and/or legal guardian's) consent, to reduce the patient's risk of exposure to COVID-19 and provide necessary medical care. Services were provided through a video synchronous discussion virtually to substitute for in-person clinic visit. Assessment & Plan:   Diagnoses and all orders for this visit:      ICD-10-CM ICD-9-CM    1. Moderate persistent asthmatic bronchitis with acute exacerbation  J45.41 493.92 albuterol (PROVENTIL VENTOLIN) 2.5 mg /3 mL (0.083 %) nebu      predniSONE (DELTASONE) 20 mg tablet   2. Moderate persistent asthma without complication  T03.50 560.27 albuterol (ProAir HFA) 90 mcg/actuation inhaler      fluticasone propion-salmeteroL (ADVAIR/WIXELA) 250-50 mcg/dose diskus inhaler   3. Anxiety and depression  F41.9 300.00 escitalopram oxalate (LEXAPRO) 20 mg tablet    F32.9 311    4. Attention deficit disorder (ADD) without hyperactivity  F98.8 314.00 dextroamphetamine-amphetamine (ADDERALL) 10 mg tablet      dextroamphetamine-amphetamine (ADDERALL) 10 mg tablet      dextroamphetamine-amphetamine (ADDERALL) 10 mg tablet   5. HTN (hypertension), benign  I10 401.1    6. Essential hypertension  I10 401.9    7.  Mixed hyperlipidemia  E78.2 272.2      Follow-up and Dispositions    · Return in about 2 months (around 10/3/2020), or if symptoms worsen or fail to improve, for asthma - IO.       results and schedule of future studies reviewed with patient  reviewed diet, exercise and weight    cardiovascular risk and specific lipid/LDL goals reviewed  reviewed medications and side effects in detail    pred burst  Resume advair   Refill adderall  Refill albuterol   Refill lexapro  Follow-up and Dispositions    · Return in about 2 months (around 10/3/2020), or if symptoms worsen or fail to improve, for asthma - IO.         AVS:  []  Sent to patient as [employer] message after visit. [x]  Mailed to patient after visit. []  Not sent to patient after visit.

## 2020-08-03 NOTE — PROGRESS NOTES
#[phone]    PAIN LEVEL 0     Pt concerned that SINGULAIR 10MG is making her cough     MISSED WORK TODAY, NEEDS WORK NOTES FAXED TO Kate Echevarria [phone]  Faxed note    Chief Complaint   Patient presents with    Asthma     cough asthma flair up.  wheezing x1 week.  Attention Deficit Disorder     med f/u      1. Have you been to the ER, urgent care clinic since your last visit? Hospitalized since your last visit? No    2. Have you seen or consulted any other health care providers outside of the [address] [place] [address] since your last visit? Include any pap smears or colon screening.  No  Health Maintenance Due   Topic Date Due    Hepatitis C Screening  1955    DTaP/Tdap/Td series (1 - Tdap) 11/20/1974    Shingrix Vaccine Age 50> (1 of 2) 11/20/2003    FOBT Q1Y Age 50-75  03/19/2016    Eye Exam Retinal or Dilated  03/19/2017    GLAUCOMA SCREENING Q2Y  11/20/2018    Bone Densitometry (Dexa) Screening  11/20/2018    Pneumococcal 65+ years (1 of 1 - PPSV23) 11/20/2018    Breast Cancer Screen Mammogram  02/03/2019    MICROALBUMIN Q1  01/28/2020    Foot Exam Q1  05/22/2020    Influenza Age 5 to Adult  08/01/2020     Recent Travel Screening and Travel History documentation     Travel Screening      No screening recorded since 08/02/20 0000      Travel History   Travel since 07/03/20     No documented travel since 07/03/20              3 most recent PHQ Screens 6/23/2020   Little interest or pleasure in doing things Not at all   Feeling down, depressed, irritable, or hopeless More than half the days   Total Score PHQ 2 2   Trouble falling or staying asleep, or sleeping too much -   Feeling tired or having little energy -   Poor appetite, weight loss, or overeating -   Feeling bad about yourself - or that you are a failure or have let yourself or your family down -   Trouble concentrating on things such as school, work, reading, or watching TV -   Moving or speaking so slowly that other people could have noticed; or the opposite being so fidgety that others notice -   Thoughts of being better off dead, or hurting yourself in some way -   PHQ 9 Score -   How difficult have these problems made it for you to do your work, take care of your home and get along with others -

## 2020-12-02 ENCOUNTER — TELEPHONE (OUTPATIENT)
Dept: INTERNAL MEDICINE CLINIC | Age: 65
End: 2020-12-02

## 2020-12-02 ENCOUNTER — VIRTUAL VISIT (OUTPATIENT)
Dept: INTERNAL MEDICINE CLINIC | Age: 65
End: 2020-12-02
Payer: COMMERCIAL

## 2020-12-02 DIAGNOSIS — E78.49 FAMILIAL HYPERLIPIDEMIA: ICD-10-CM

## 2020-12-02 DIAGNOSIS — J45.40 MODERATE PERSISTENT ASTHMA WITHOUT COMPLICATION: ICD-10-CM

## 2020-12-02 DIAGNOSIS — E11.9 TYPE 2 DIABETES MELLITUS WITHOUT COMPLICATION, WITHOUT LONG-TERM CURRENT USE OF INSULIN (HCC): ICD-10-CM

## 2020-12-02 DIAGNOSIS — R60.0 BILATERAL LEG EDEMA: ICD-10-CM

## 2020-12-02 DIAGNOSIS — Z77.098 CHEMICAL EXPOSURE: ICD-10-CM

## 2020-12-02 DIAGNOSIS — F98.8 ATTENTION DEFICIT DISORDER (ADD) WITHOUT HYPERACTIVITY: ICD-10-CM

## 2020-12-02 DIAGNOSIS — H10.13 ALLERGIC CONJUNCTIVITIS AND RHINITIS, BILATERAL: Primary | ICD-10-CM

## 2020-12-02 DIAGNOSIS — J30.9 ALLERGIC CONJUNCTIVITIS AND RHINITIS, BILATERAL: Primary | ICD-10-CM

## 2020-12-02 DIAGNOSIS — J45.42 MODERATE PERSISTENT ASTHMA WITH STATUS ASTHMATICUS: ICD-10-CM

## 2020-12-02 DIAGNOSIS — E55.9 VITAMIN D DEFICIENCY: ICD-10-CM

## 2020-12-02 DIAGNOSIS — I10 ESSENTIAL HYPERTENSION: ICD-10-CM

## 2020-12-02 PROCEDURE — 99214 OFFICE O/P EST MOD 30 MIN: CPT | Performed by: INTERNAL MEDICINE

## 2020-12-02 PROCEDURE — 3051F HG A1C>EQUAL 7.0%<8.0%: CPT | Performed by: INTERNAL MEDICINE

## 2020-12-02 RX ORDER — DEXTROAMPHETAMINE SACCHARATE, AMPHETAMINE ASPARTATE, DEXTROAMPHETAMINE SULFATE AND AMPHETAMINE SULFATE 2.5; 2.5; 2.5; 2.5 MG/1; MG/1; MG/1; MG/1
10 TABLET ORAL
Qty: 90 TAB | Refills: 0 | Status: SHIPPED | OUTPATIENT
Start: 2021-01-31

## 2020-12-02 RX ORDER — ALBUTEROL SULFATE 90 UG/1
2 AEROSOL, METERED RESPIRATORY (INHALATION)
Qty: 8.5 G | Refills: 3 | Status: SHIPPED | OUTPATIENT
Start: 2020-12-02 | End: 2021-12-27 | Stop reason: SDUPTHER

## 2020-12-02 RX ORDER — FLUTICASONE PROPIONATE AND SALMETEROL 250; 50 UG/1; UG/1
1 POWDER RESPIRATORY (INHALATION) 2 TIMES DAILY
Qty: 1 INHALER | Refills: 5 | Status: SHIPPED | OUTPATIENT
Start: 2020-12-02 | End: 2021-12-23 | Stop reason: SDUPTHER

## 2020-12-02 RX ORDER — BUTALBITAL, ACETAMINOPHEN AND CAFFEINE 50; 325; 40 MG/1; MG/1; MG/1
1 TABLET ORAL
Qty: 20 TAB | Refills: 1 | Status: SHIPPED | OUTPATIENT
Start: 2020-12-02

## 2020-12-02 RX ORDER — OLOPATADINE HYDROCHLORIDE 1 MG/ML
2 SOLUTION/ DROPS OPHTHALMIC 2 TIMES DAILY
Qty: 5 ML | Refills: 5 | Status: SHIPPED | OUTPATIENT
Start: 2020-12-02 | End: 2020-12-02 | Stop reason: CLARIF

## 2020-12-02 RX ORDER — DEXTROAMPHETAMINE SACCHARATE, AMPHETAMINE ASPARTATE, DEXTROAMPHETAMINE SULFATE AND AMPHETAMINE SULFATE 2.5; 2.5; 2.5; 2.5 MG/1; MG/1; MG/1; MG/1
10 TABLET ORAL
Qty: 90 TAB | Refills: 0 | Status: SHIPPED | OUTPATIENT
Start: 2020-12-02 | End: 2021-01-29

## 2020-12-02 RX ORDER — HYDROCHLOROTHIAZIDE 25 MG/1
25 TABLET ORAL DAILY
Qty: 90 TAB | Refills: 1 | Status: SHIPPED | OUTPATIENT
Start: 2020-12-02 | End: 2022-01-04

## 2020-12-02 RX ORDER — AZELASTINE HYDROCHLORIDE 0.5 MG/ML
1 SOLUTION/ DROPS OPHTHALMIC 2 TIMES DAILY
Qty: 6 ML | Refills: 5 | Status: SHIPPED | OUTPATIENT
Start: 2020-12-02 | End: 2022-01-04

## 2020-12-02 RX ORDER — MONTELUKAST SODIUM 10 MG/1
TABLET ORAL
Qty: 90 TAB | Refills: 1 | Status: SHIPPED | OUTPATIENT
Start: 2020-12-02 | End: 2021-06-09

## 2020-12-02 RX ORDER — DEXTROAMPHETAMINE SACCHARATE, AMPHETAMINE ASPARTATE, DEXTROAMPHETAMINE SULFATE AND AMPHETAMINE SULFATE 2.5; 2.5; 2.5; 2.5 MG/1; MG/1; MG/1; MG/1
10 TABLET ORAL
Qty: 90 TAB | Refills: 0 | Status: SHIPPED | OUTPATIENT
Start: 2021-01-01 | End: 2021-12-17 | Stop reason: SDUPTHER

## 2020-12-02 NOTE — PROGRESS NOTES
VV-Pt is aware of billable appt depending on insurance plan. Preferred number [phone]  Pt verbally agrees to labs, orders, and others to be mailed to confirmed home address. Identified pt with two pt identifiers(name and ). Reviewed record in preparation for visit and have obtained necessary documentation. All patient medications has been reviewed. Chief Complaint   Patient presents with    Migraine     x 20. Pt states, co-worker is using perfume  that is causing her migraines. She believes she is allergic to perfumes.  Concern For COVID-19 (Coronavirus)     Pt has a pending COVID test x120.  Medication Refill     adderall, singulair, advair, albuterol. pt would like to have HCTZ refilled for 1 full tab po daily instead of 0.5 tab. Health Maintenance Due   Topic    Hepatitis C Screening     DTaP/Tdap/Td series (1 - Tdap)    Shingrix Vaccine Age 49> (1 of 2)    Colorectal Cancer Screening Combo     Eye Exam Retinal or Dilated     GLAUCOMA SCREENING Q2Y     Bone Densitometry (Dexa) Screening     Pneumococcal 65+ years (1 of 1 - PPSV23)    Breast Cancer Screen Mammogram     MICROALBUMIN Q1     Foot Exam Q1     Flu Vaccine (1)     Health Maintenance Review: Patient reminded of \"due or due soon\" health maintenance. I have asked the patient to contact his/her primary care provider (PCP) for follow-up on his/her health maintenance.     Wt Readings from Last 3 Encounters:   20 188 lb (85.3 kg)   02/10/20 187 lb (84.8 kg)   20 185 lb (83.9 kg)     Temp Readings from Last 3 Encounters:   20 97.8 °F (36.6 °C) (Oral)   02/10/20 98.6 °F (37 °C) (Oral)   20 98 °F (36.7 °C) (Oral)     BP Readings from Last 3 Encounters:   20 152/87   02/10/20 138/82   20 173/71     Pulse Readings from Last 3 Encounters:   20 (!) 101   02/10/20 (!) 110   20 79         Coordination of Care Questionnaire:   1) Have you been to an emergency room, urgent care, or hospitalized since your last visit?   no       2. Have seen or consulted any other health care provider since your last visit? NO    Advance Care Planning:   End of Life Planning: has NO advanced directive - not interested in additional information, has NO advanced directive  - add't info provided, reviewed DNR/DNI and patient is not interested  Cyndy Echeverria [address] ACP-Facilitator appointment no    Patient is accompanied by self I have received verbal consent from Ruel Larose to discuss any/all medical information while they are present in the room.

## 2020-12-02 NOTE — LETTER
NOTIFICATION for WORK ACCOMMODATION 
 
12/2/2020 Ms. Alyssa Solano [address] [facility] [address] [phone] To Whom It May Concern: 
 
Alyssa Solano is currently under the care of [facility]. She has severe sensitivity to airborne chemical exposures, like perfumes. She has experienced adverse health symptoms due to this exposure. Please review possible schedule adjustments or employee work space adjustments to limit Ms. Esteban Villarreal exposure to such perfumes or odors. Thank you for your consideration. If there are questions or concerns please have the patient contact our office. Sincerely, Rosa Isela Israel MD

## 2020-12-02 NOTE — TELEPHONE ENCOUNTER
Please re-submit a PA for Repatha with the diagnosis of Familial Hyperlipidemia and a baseline LDL > 190.

## 2020-12-02 NOTE — LETTER
NOTIFICATION RETURN TO WORK   
 
12/2/2020 12:22 PM 
 
Ms. Milka Valencia [address] [place] [address] [place] [phone] To Whom It May Concern: 
 
Milka Valencia is currently under the care of [facility]. She will return to work  on: 12/4/20 If there are questions or concerns please have the patient contact our office. Sincerely, Frances Ng MD

## 2020-12-02 NOTE — PROGRESS NOTES
Vianney Inman is a 77 y.o. female who was seen by synchronous (real-time) audio-video technology on 12/2/2020. Consent: Vianney Inman, who was seen by synchronous (real-time) audio-video technology, and/or her healthcare decision maker, is aware that this patient-initiated, Telehealth encounter on 12/2/2020 is a billable service, with coverage as determined by her insurance carrier. She is aware that she may receive a bill and has provided verbal consent to proceed: Yes. I was in the office while conducting this encounter. Subjective:   Vianney Inman was seen for Migraine (x 11/25/20. Pt states, co-worker is using perfume  that is causing her migraines. She believes she is allergic to perfumes. ); Concern For COVID-19 (Coronavirus) (Pt has a pending COVID test x11/30/20.); and Medication Refill (adderall, singulair, advair, albuterol. pt would like to have HCTZ refilled for 1 full tab po daily instead of 0.5 tab. )      Notes:  Pt has a COVID 19 test pending - illness recently with eye irritation, migraine HA, poor taste. Pt attributes it to perfume exposure at work. Pt continues to taste and smell the perfume that she was exposed to  No cough or asthma symptoms    Exposure to perfume with employee at work who uses the same work space on different shifts. The employee agrees to not use the perfume going forward    Pt on singulair and advair and resp status better. Less albuterol use in recent weeks. Taking and tolerating zetia    Not on Repatha - denied since appropriate info not provided. Intermittent adderall use    Nursing screenings reviewed by provider at visit. Past medical, Social, and Family history reviewed  Medications reviewed and updated.     Allergies   Allergen Reactions    Codeine Other (comments)     Severe headaches    Erythromycin Rash    Morphine Hives     No longer an allergy per patient     Neomycin Rash       Prior to Admission medications Medication Sig Start Date End Date Taking? Authorizing Provider   albuterol (PROVENTIL VENTOLIN) 2.5 mg /3 mL (0.083 %) nebu 3 mL by Nebulization route every four (4) hours as needed for Cough (wheezing, SOB). 8/3/20  Yes Blair John MD   albuterol (ProAir HFA) 90 mcg/actuation inhaler Take 2 Puffs by inhalation every four (4) hours as needed for Wheezing. 8/3/20  Yes Blair John MD   escitalopram oxalate (LEXAPRO) 20 mg tablet Take 2 tablets by mouth once daily 8/3/20  Yes Blair John MD   fluticasone propion-salmeteroL (ADVAIR/WIXELA) 250-50 mcg/dose diskus inhaler Take 1 Puff by inhalation two (2) times a day. 8/3/20  Yes Blair John MD   dextroamphetamine-amphetamine (ADDERALL) 10 mg tablet Take 1 Tab by mouth three (3) times daily as needed (ADHD). Max Daily Amount: 30 mg. 9/2/20  Yes Blair John MD   metFORMIN ER (GLUCOPHAGE XR) 500 mg tablet Take 2 Tabs by mouth daily (with dinner). 6/25/20  Yes Blair John MD   guaiFENesin (MUCINEX) 1,200 mg Ta12 ER tablet Take 1,200 mg by mouth two (2) times a day. Yes Provider, Historical   hydroCHLOROthiazide (HYDRODIURIL) 25 mg tablet Take 0.5 Tabs by mouth daily. Patient taking differently: Take 12.5 mg by mouth daily. Pt states, been taking 1 tab po daily. 12/30/19  Yes Blair John MD   montelukast (SINGULAIR) 10 mg tablet TAKE 1 TABLET BY MOUTH DAILY  Patient taking differently: TAKE 1 TABLET BY MOUTH DAILY. Pt states, need refills. 11/25/19  Yes Evans Hernandez NP   ezetimibe (ZETIA) 10 mg tablet Take 1 Tab by mouth daily. 11/22/19  Yes Torrie Lundborg, MD   metoprolol succinate (TOPROL-XL) 50 mg XL tablet Take 1 Tab by mouth daily. 11/22/19  Yes Torrie Lundborg, MD   cetirizine (ZYRTEC) 10 mg tablet Take 1 Tab by mouth daily. 11/22/19  Yes Torrie Lundborg, MD   azelastine (ASTELIN) 137 mcg (0.1 %) nasal spray 2 Sprays by Both Nostrils route two (2) times daily as needed for Rhinitis (allergies). 11/22/19  Yes Alina Rojas MD   prasugrel (EFFIENT) 10 mg tablet take 1 tablet by mouth once daily 4/24/19  Yes Provider, Historical   evolocumab (Repatha SureClick) pen injection 1 mL by SubCUTAneous route every fourteen (14) days. Patient not taking: Reported on 12/2/2020 8/26/20   Marina Stephens MD   dextroamphetamine-amphetamine (ADDERALL) 10 mg tablet Take 1 Tab by mouth three (3) times daily as needed (ADHD). Max Daily Amount: 30 mg. Indications: attention deficit disorder with hyperactivity  Patient not taking: Reported on 12/2/2020 10/2/20   Marina Stephens MD   dextroamphetamine-amphetamine (ADDERALL) 10 mg tablet Take 1 Tab by mouth three (3) times daily as needed (ADHD). Max Daily Amount: 30 mg. Patient not taking: Reported on 12/2/2020 8/3/20   Marina Stephens MD   dextroamphetamine-amphetamine (ADDERALL) 10 mg tablet Take 10mg as directed. May take up to 3 tablets by mouth in 24hr period. Patient not taking: Reported on 12/2/2020 6/26/20   Cathie Patel NP   DM/acetaminophen/doxylamine (VICKS NYQUIL NIGHTTIME RELIEF PO) Take  by mouth. Provider, Historical   beclomethasone (QVAR) 80 mcg/actuation aero Take 1 Puff by inhalation two (2) times a day. Patient not taking: Reported on 12/2/2020 11/22/19   Alina Rojas MD   atorvastatin (LIPITOR) 40 mg tablet Take 1 Tab by mouth daily. Patient not taking: Reported on 12/2/2020 1/28/19   Cathie Patel NP         ROS     A complete ROS was performed and negative except as noted in HPI      PHYSICAL EXAMINATION:    Vital Signs: There were no vitals taken for this visit. No flowsheet data found.       Constitutional: [x] Appears well-developed and well-nourished [x] No apparent distress      Mental status: [x] Alert and awake  [x] Oriented [x] Able to follow commands       Eyes:   EOM    [x]  Normal      Sclera  [x]  Normal              Discharge [x]  None visible       HENT: [x] Normocephalic, atraumatic    [x] Mouth/Throat: Mucous membranes are moist    External Ears [x] Normal      Neck: [x] No visualized mass     Pulmonary/Chest: [x] Respiratory effort normal   [x] No visualized signs of difficulty breathing or respiratory distress    Musculoskeletal:  [x] Normal range of motion of neck    Neurological:        [x] No Facial Asymmetry (Cranial nerve 7 motor function) (limited exam due to video visit)          [x] No gaze palsy     Skin:        [x] No significant exanthematous lesions or discoloration noted on facial skin             Psychiatric:       [x] Normal Affect       Other pertinent observable physical exam findings:  None. Prior labs reviewed. We discussed the expected course, resolution and complications of the diagnosis(es) in detail. Medication risks, benefits, costs, interactions, and alternatives were discussed as indicated. I advised her to contact the office if her condition worsens, changes or fails to improve as anticipated. She expressed understanding with the diagnosis(es) and plan. Pacheco Su is a 77 y.o. female who was evaluated by a video visit encounter for concerns as above. Patient identification was verified prior to start of the visit. A caregiver was present when appropriate. Due to this being a TeleHealth encounter (During IOKS-12 public health emergency), evaluation of the following organ systems was limited: Vitals/Constitutional/EENT/Resp/CV/GI//MS/Neuro/Skin/Heme-Lymph-Imm. Pursuant to the emergency declaration under the [facility], 1135 waiver authority and the [employer] Act, this Virtual  Visit was conducted, with patient's (and/or legal guardian's) consent, to reduce the patient's risk of exposure to COVID-19 and provide necessary medical care. Services were provided through a video synchronous discussion virtually to substitute for in-person clinic visit. Assessment & Plan:   Diagnoses and all orders for this visit:      ICD-10-CM ICD-9-CM    1. Allergic conjunctivitis and rhinitis, bilateral  H10.13 372.05 DISCONTINUED: olopatadine (PATANOL) 0.1 % ophthalmic solution    J30.9 477.9    2. Moderate persistent asthma without complication  S09.91 398.52 albuterol (ProAir HFA) 90 mcg/actuation inhaler      fluticasone propion-salmeteroL (ADVAIR/WIXELA) 250-50 mcg/dose diskus inhaler   3. Attention deficit disorder (ADD) without hyperactivity  F98.8 314.00 dextroamphetamine-amphetamine (ADDERALL) 10 mg tablet      dextroamphetamine-amphetamine (ADDERALL) 10 mg tablet      dextroamphetamine-amphetamine (ADDERALL) 10 mg tablet   4. Moderate persistent asthma with status asthmaticus  J45.42 493.91 montelukast (SINGULAIR) 10 mg tablet   5. Essential hypertension  I10 401.9 hydroCHLOROthiazide (HYDRODIURIL) 25 mg tablet      CBC WITH AUTOMATED DIFF   6. Bilateral leg edema  R60.0 782.3 hydroCHLOROthiazide (HYDRODIURIL) 25 mg tablet   7. Familial hyperlipidemia  E78.49 366.4 CK      METABOLIC PANEL, COMPREHENSIVE      LIPID PANEL   8. Type 2 diabetes mellitus without complication, without long-term current use of insulin (HCC)  E11.9 250.00 HEMOGLOBIN A1C WITH EAG   9. Vitamin D deficiency  E55.9 268.9 VITAMIN D, 25 HYDROXY   10. Chemical exposure  Z77.098 V87.2      Follow-up and Dispositions    · Return in about 3 months (around 3/2/2021), or if symptoms worsen or fail to improve, for diabetes, blood pressure, cholesterol.        results and schedule of future studies reviewed with patient  reviewed diet, exercise and weight   cardiovascular risk and specific lipid/LDL goals reviewed  reviewed medications and side effects in detail    letter supporting change in work shift and/or environment to limit exposure to perfume chemical.  Labs at lab - mail orders to pt  Refill meds  Pt needs FMLA paperwork - pt to drop off forms for intermittent leave for asthma, allergy reactions. patanol eye gtts trial if covered. fioricet prn migraine  Resubmit PA for Repatha with familial hyperlipidemia as dx      AVS:  [x]  Sent to patient as [employer] message after visit. []  Mailed to patientat after visit. []  Not sent to patient after visit. addendum:  patanol not covered.   Change to [employer] (azelastine)

## 2020-12-10 NOTE — TELEPHONE ENCOUNTER
Re submitted PA with added info Familial Hyperlipidemia and a baseline LDL > 190.      Key# [id number] cover my meds

## 2020-12-11 NOTE — TELEPHONE ENCOUNTER
Please contact [employer] rep - either Domi Cao [phone] or Hal Christina [phone] to notify them of the insurance denial despite familial hyperlipidemia diagnoses and statin intolerance, and to obtain their assistance in getting on approval for their product.

## 2020-12-11 NOTE — TELEPHONE ENCOUNTER
Message from Plan  PA Case: [id number], Status: Denied. Notification: Completed. PA denied with added info Familial Hyperlipidemia and a baseline LDL > 190.

## 2020-12-29 ENCOUNTER — VIRTUAL VISIT (OUTPATIENT)
Dept: INTERNAL MEDICINE CLINIC | Age: 65
End: 2020-12-29
Payer: COMMERCIAL

## 2020-12-29 DIAGNOSIS — I10 ESSENTIAL HYPERTENSION: ICD-10-CM

## 2020-12-29 DIAGNOSIS — R43.0 LOSS OF SMELL: ICD-10-CM

## 2020-12-29 DIAGNOSIS — J45.40 MODERATE PERSISTENT ASTHMA WITHOUT COMPLICATION: ICD-10-CM

## 2020-12-29 DIAGNOSIS — R06.2 WHEEZING: ICD-10-CM

## 2020-12-29 DIAGNOSIS — J01.20 SUBACUTE ETHMOIDAL SINUSITIS: ICD-10-CM

## 2020-12-29 DIAGNOSIS — J45.21 MILD INTERMITTENT ASTHMATIC BRONCHITIS WITH ACUTE EXACERBATION: ICD-10-CM

## 2020-12-29 DIAGNOSIS — E11.9 TYPE 2 DIABETES MELLITUS WITHOUT COMPLICATION, WITHOUT LONG-TERM CURRENT USE OF INSULIN (HCC): ICD-10-CM

## 2020-12-29 DIAGNOSIS — Z77.098 CHEMICAL EXPOSURE: ICD-10-CM

## 2020-12-29 DIAGNOSIS — R43.2 LOSS OF TASTE: Primary | ICD-10-CM

## 2020-12-29 DIAGNOSIS — J06.9 UPPER RESPIRATORY TRACT INFECTION, UNSPECIFIED TYPE: ICD-10-CM

## 2020-12-29 PROCEDURE — 3051F HG A1C>EQUAL 7.0%<8.0%: CPT | Performed by: INTERNAL MEDICINE

## 2020-12-29 PROCEDURE — 99214 OFFICE O/P EST MOD 30 MIN: CPT | Performed by: INTERNAL MEDICINE

## 2020-12-29 RX ORDER — AMOXICILLIN AND CLAVULANATE POTASSIUM 875; 125 MG/1; MG/1
1 TABLET, FILM COATED ORAL 2 TIMES DAILY
Qty: 20 TAB | Refills: 0 | Status: SHIPPED | OUTPATIENT
Start: 2020-12-29 | End: 2021-03-16 | Stop reason: SDUPTHER

## 2020-12-29 NOTE — LETTER
NOTIFICATION RETURN TO WORK  
 
12/29/2020 Ms. Julia Redding [facility] [phone] To Whom It May Concern: 
 
Julia Phelps is currently under the care of [facility]. She will return to work  on: 1/1/21 If there are questions or concerns please have the patient contact our office. Sincerely, Constance Reddy MD

## 2020-12-29 NOTE — PROGRESS NOTES
Kenneth Vang is a 77 y.o. female who was seen by synchronous (real-time) audio-video technology on 12/29/2020. Consent: Kenneth Vang, who was seen by synchronous (real-time) audio-video technology, and/or her healthcare decision maker, is aware that this patient-initiated, Telehealth encounter on 12/29/2020 is a billable service, with coverage as determined by her insurance carrier. She is aware that she may receive a bill and has provided verbal consent to proceed: Yes. I was in the office while conducting this encounter. Subjective:   Kenneth Vang was seen for Concern For COVID-19 (Coronavirus) (took test on Sunday, negative results, still having no taste, every thing smells terrible)      Notes:  Loss of taste and smell  Exposed to bleach in cleaning product at work    Asthma stable. Only mild cough  +med compliance. +ST and mild cough. covid testing is negative     poor energy    Nursing screenings reviewed by provider at visit. Past medical, Social, and Family history reviewed  Medications reviewed and updated. Allergies   Allergen Reactions    Codeine Other (comments)     Severe headaches    Erythromycin Rash    Morphine Hives     No longer an allergy per patient     Neomycin Rash       Prior to Admission medications    Medication Sig Start Date End Date Taking? Authorizing Provider   albuterol (ProAir HFA) 90 mcg/actuation inhaler Take 2 Puffs by inhalation every four (4) hours as needed for Wheezing. 12/2/20  Yes Derek Diaz MD   fluticasone propion-salmeteroL (ADVAIR/WIXELA) 250-50 mcg/dose diskus inhaler Take 1 Puff by inhalation two (2) times a day. 12/2/20  Yes Derek Diaz MD   dextroamphetamine-amphetamine (ADDERALL) 10 mg tablet Take 1 Tab by mouth three (3) times daily as needed (ADHD).  Max Daily Amount: 30 mg. 12/2/20  Yes Derek Diaz MD   montelukast (SINGULAIR) 10 mg tablet TAKE 1 TABLET BY MOUTH DAILY 12/2/20  Yes Derek Diaz MD   hydroCHLOROthiazide (HYDRODIURIL) 25 mg tablet Take 1 Tab by mouth daily. 12/2/20  Yes Torri Ledezma MD   butalbital-acetaminophen-caffeine (FIORICET, ESGIC) -40 mg per tablet Take 1 Tab by mouth every six (6) hours as needed for Headache or Migraine. 12/2/20  Yes Torri Ledezma MD   dextroamphetamine-amphetamine (ADDERALL) 10 mg tablet Take 1 Tab by mouth three (3) times daily as needed (ADHD). Max Daily Amount: 30 mg. 1/31/21  Yes Torri Ledezma MD   dextroamphetamine-amphetamine (ADDERALL) 10 mg tablet Take 1 Tab by mouth three (3) times daily as needed (ADHD). Max Daily Amount: 30 mg. Indications: attention deficit disorder with hyperactivity 1/1/21  Yes Torri Ledezma MD   azelastine (OPTIVAR) 0.05 % ophthalmic solution Administer 1 Drop to both eyes two (2) times a day. Use in affected eye(s) 12/2/20  Yes Torri Ledezma MD   albuterol (PROVENTIL VENTOLIN) 2.5 mg /3 mL (0.083 %) nebu 3 mL by Nebulization route every four (4) hours as needed for Cough (wheezing, SOB). 8/3/20  Yes Torri Ledezma MD   escitalopram oxalate (LEXAPRO) 20 mg tablet Take 2 tablets by mouth once daily 8/3/20  Yes Torri Ledezma MD   dextroamphetamine-amphetamine (ADDERALL) 10 mg tablet Take 10mg as directed. May take up to 3 tablets by mouth in 24hr period. 6/26/20  Yes Deloris Eugene, BRANDON   metFORMIN ER (GLUCOPHAGE XR) 500 mg tablet Take 2 Tabs by mouth daily (with dinner). 6/25/20  Yes Torri Ledezma MD   guaiFENesin (MUCINEX) 1,200 mg Ta12 ER tablet Take 1,200 mg by mouth two (2) times a day. Yes Provider, Historical   ezetimibe (ZETIA) 10 mg tablet Take 1 Tab by mouth daily. 11/22/19  Yes Kartik Theodore MD   metoprolol succinate (TOPROL-XL) 50 mg XL tablet Take 1 Tab by mouth daily. 11/22/19  Yes Kartik Theodore MD   azelastine (ASTELIN) 137 mcg (0.1 %) nasal spray 2 Sprays by Both Nostrils route two (2) times daily as needed for Rhinitis (allergies).  11/22/19  Yes Fady Cason MD   evolocumab (Repatha SureClick) pen injection 1 mL by SubCUTAneous route every fourteen (14) days. Patient not taking: Reported on 12/2/2020 8/26/20   Evelyn Barbour MD   DM/acetaminophen/doxylamine (VICKS NYQUIL NIGHTTIME RELIEF PO) Take  by mouth. Provider, Historical   beclomethasone (QVAR) 80 mcg/actuation aero Take 1 Puff by inhalation two (2) times a day. Patient not taking: Reported on 12/2/2020 11/22/19   Fady Cason MD   cetirizine (ZYRTEC) 10 mg tablet Take 1 Tab by mouth daily. 11/22/19   Fady Cason MD   prasugrel (EFFIENT) 10 mg tablet take 1 tablet by mouth once daily 4/24/19   Provider, Historical   atorvastatin (LIPITOR) 40 mg tablet Take 1 Tab by mouth daily. Patient not taking: Reported on 12/2/2020 1/28/19   Lesli Mills NP         ROS    A complete ROS was performed and negative except as noted in HPI    PHYSICAL EXAMINATION:    Vital Signs: There were no vitals taken for this visit. Patient-Reported Vitals 12/29/2020   Patient-Reported Temperature 97.  oral         Constitutional: [x] Appears well-developed and well-nourished [x] No apparent distress      Mental status: [x] Alert and awake  [x] Oriented [x] Able to follow commands       Eyes:   EOM    [x]  Normal      Sclera  [x]  Normal              Discharge [x]  None visible       HENT: [x] Normocephalic, atraumatic    [x] Mouth/Throat: Mucous membranes are moist    External Ears [x] Normal      Neck: [x] No visualized mass     Pulmonary/Chest: [x] Respiratory effort normal   [x] No visualized signs of difficulty breathing or respiratory distress    Musculoskeletal:  [x] Normal range of motion of neck    Neurological:        [x] No Facial Asymmetry (Cranial nerve 7 motor function) (limited exam due to video visit)          [x] No gaze palsy     Skin:        [x] No significant exanthematous lesions or discoloration noted on facial skin             Psychiatric:       [x] Normal Affect       Other pertinent observable physical exam findings:  None. We discussed the expected course, resolution and complications of the diagnosis(es) in detail. Medication risks, benefits, costs, interactions, and alternatives were discussed as indicated. I advised her to contact the office if her condition worsens, changes or fails to improve as anticipated. She expressed understanding with the diagnosis(es) and plan. Elvin Ganser is a 77 y.o. female who was evaluated by a video visit encounter for concerns as above. Patient identification was verified prior to start of the visit. A caregiver was present when appropriate. Due to this being a TeleHealth encounter (During [facility] emergency), evaluation of the following organ systems was limited: Vitals/Constitutional/EENT/Resp/CV/GI//MS/Neuro/Skin/Heme-Lymph-Imm. Pursuant to the emergency declaration under the [address] [place] [address] waiver authority and the [employer] and [employer] Act, this Virtual  Visit was conducted, with patient's (and/or legal guardian's) consent, to reduce the patient's risk of exposure to COVID-19 and provide necessary medical care. Services were provided through a video synchronous discussion virtually to substitute for in-person clinic visit. Assessment & Plan:   Diagnoses and all orders for this visit:    Possible sinusitis  Consider false negative COVID testing      ICD-10-CM ICD-9-CM    1. Loss of taste  R43.2 781.1 amoxicillin-clavulanate (AUGMENTIN) 875-125 mg per tablet   2. Loss of smell  R43.0 781.1 amoxicillin-clavulanate (AUGMENTIN) 875-125 mg per tablet   3. Moderate persistent asthma without complication  J29.71 242.71    4. Essential hypertension  I10 401.9    5. Type 2 diabetes mellitus without complication, without long-term current use of insulin (HCC)  E11.9 250.00    6.  Chemical exposure  Z77.098 V87.2 7. Wheezing  R06.2 786.07    8. Mild intermittent asthmatic bronchitis with acute exacerbation  J45.21 493.92    9. Upper respiratory tract infection, unspecified type  J06.9 465.9    10. Subacute ethmoidal sinusitis  J01.20 461.2 amoxicillin-clavulanate (AUGMENTIN) 875-125 mg per tablet     Follow-up and Dispositions    · Return in about 2 months (around 2/28/2021), or if symptoms worsen or fail to improve, for asthma, cholesterol. results and schedule of future studies reviewed with patient  reviewed diet, exercise and weight   cardiovascular risk and specific lipid/LDL goals reviewed  reviewed medications and side effects in detail    augmentin   Ref to ENT if worsens or persists  Pt to check on FMLA paperwork. AVS:  [x]  Sent to patient as [employer] message after visit. []  Mailed to patient after visit. []  Not sent to patient after visit.

## 2021-01-29 ENCOUNTER — VIRTUAL VISIT (OUTPATIENT)
Dept: INTERNAL MEDICINE CLINIC | Age: 66
End: 2021-01-29
Payer: COMMERCIAL

## 2021-01-29 ENCOUNTER — TELEPHONE (OUTPATIENT)
Dept: INTERNAL MEDICINE CLINIC | Age: 66
End: 2021-01-29

## 2021-01-29 DIAGNOSIS — I10 ESSENTIAL HYPERTENSION: ICD-10-CM

## 2021-01-29 DIAGNOSIS — J45.40 MODERATE PERSISTENT ASTHMA WITHOUT COMPLICATION: ICD-10-CM

## 2021-01-29 DIAGNOSIS — E55.9 VITAMIN D DEFICIENCY: ICD-10-CM

## 2021-01-29 DIAGNOSIS — Z77.098 CHEMICAL EXPOSURE: Primary | ICD-10-CM

## 2021-01-29 DIAGNOSIS — R43.2 LOSS OF TASTE: ICD-10-CM

## 2021-01-29 DIAGNOSIS — R43.0 LOSS OF SMELL: ICD-10-CM

## 2021-01-29 DIAGNOSIS — H04.301 TEAR DUCT INFECTION, RIGHT: ICD-10-CM

## 2021-01-29 DIAGNOSIS — F98.8 ATTENTION DEFICIT DISORDER (ADD) WITHOUT HYPERACTIVITY: ICD-10-CM

## 2021-01-29 DIAGNOSIS — E11.9 TYPE 2 DIABETES MELLITUS WITHOUT COMPLICATION, WITHOUT LONG-TERM CURRENT USE OF INSULIN (HCC): ICD-10-CM

## 2021-01-29 DIAGNOSIS — E78.2 MIXED HYPERLIPIDEMIA: ICD-10-CM

## 2021-01-29 PROCEDURE — 99214 OFFICE O/P EST MOD 30 MIN: CPT | Performed by: INTERNAL MEDICINE

## 2021-01-29 RX ORDER — DEXTROAMPHETAMINE SACCHARATE, AMPHETAMINE ASPARTATE, DEXTROAMPHETAMINE SULFATE AND AMPHETAMINE SULFATE 2.5; 2.5; 2.5; 2.5 MG/1; MG/1; MG/1; MG/1
10 TABLET ORAL
Qty: 90 TAB | Refills: 0 | Status: SHIPPED | OUTPATIENT
Start: 2021-04-29 | End: 2021-02-03 | Stop reason: SDUPTHER

## 2021-01-29 RX ORDER — DEXTROAMPHETAMINE SACCHARATE, AMPHETAMINE ASPARTATE, DEXTROAMPHETAMINE SULFATE AND AMPHETAMINE SULFATE 2.5; 2.5; 2.5; 2.5 MG/1; MG/1; MG/1; MG/1
10 TABLET ORAL
Qty: 90 TAB | Refills: 0 | Status: SHIPPED | OUTPATIENT
Start: 2021-03-01 | End: 2021-12-23 | Stop reason: SDUPTHER

## 2021-01-29 RX ORDER — SULFACETAMIDE SODIUM 100 MG/ML
1 SOLUTION/ DROPS OPHTHALMIC
Qty: 15 ML | Refills: 2 | Status: SHIPPED | OUTPATIENT
Start: 2021-01-29 | End: 2021-02-15 | Stop reason: CLARIF

## 2021-01-29 RX ORDER — DEXTROAMPHETAMINE SACCHARATE, AMPHETAMINE ASPARTATE, DEXTROAMPHETAMINE SULFATE AND AMPHETAMINE SULFATE 2.5; 2.5; 2.5; 2.5 MG/1; MG/1; MG/1; MG/1
TABLET ORAL
Qty: 90 TAB | Refills: 0 | Status: SHIPPED | OUTPATIENT
Start: 2021-03-31

## 2021-01-29 RX ORDER — PREDNISONE 10 MG/1
TABLET ORAL
Qty: 21 TAB | Refills: 0 | Status: SHIPPED | OUTPATIENT
Start: 2021-01-29 | End: 2021-03-16 | Stop reason: SDUPTHER

## 2021-01-29 NOTE — PROGRESS NOTES
Vivian Michael (: 1955) is a 77 y.o. female, established patient, here for evaluation of the following chief complaint(s)--see below:    Vivian Michael is a 77 y.o. female who was seen by synchronous (real-time) audio-video technology on 2021. Consent: Vivian Michael, who was seen by synchronous (real-time) audio-video technology, and/or her healthcare decision maker, is aware that this patient-initiated, Telehealth encounter on 2021 is a billable service, with coverage as determined by her insurance carrier. She is aware that she may receive a bill and has provided verbal consent to proceed: Yes. I was in the office while conducting this encounter. Subjective:   Vivian Michael was seen for:  Chief Complaint   Patient presents with    Allergies     possible allergies to perfume        Notes:  Perfume exposure at work on Monday  Felt sick since  Out of work this week.    +migraine  Poor energy  +malaise  +nausea  Smell is distorted     Mild improvement today, but not near baseline    adderall dosing is effective and stable    Other med use and conditions stable. Nursing screenings reviewed by provider at visit. Past medical, Social, and Family history reviewed  Medications reviewed and updated. Allergies   Allergen Reactions    Codeine Other (comments)     Severe headaches    Erythromycin Rash    Morphine Hives     No longer an allergy per patient     Neomycin Rash       Prior to Admission medications    Medication Sig Start Date End Date Taking? Authorizing Provider   albuterol (ProAir HFA) 90 mcg/actuation inhaler Take 2 Puffs by inhalation every four (4) hours as needed for Wheezing. 20  Yes Mayo Lemon MD   fluticasone propion-salmeteroL (ADVAIR/WIXELA) 250-50 mcg/dose diskus inhaler Take 1 Puff by inhalation two (2) times a day.  20  Yes Mayo Lemon MD   montelukast (SINGULAIR) 10 mg tablet TAKE 1 TABLET BY MOUTH DAILY 20  Yes Dejah Castro MD   hydroCHLOROthiazide (HYDRODIURIL) 25 mg tablet Take 1 Tab by mouth daily. 12/2/20  Yes Dejah Castro MD   albuterol (PROVENTIL VENTOLIN) 2.5 mg /3 mL (0.083 %) nebu 3 mL by Nebulization route every four (4) hours as needed for Cough (wheezing, SOB). 8/3/20  Yes Dejah Castro MD   escitalopram oxalate (LEXAPRO) 20 mg tablet Take 2 tablets by mouth once daily 8/3/20  Yes Dejah Castro MD   dextroamphetamine-amphetamine (ADDERALL) 10 mg tablet Take 10mg as directed. May take up to 3 tablets by mouth in 24hr period. 6/26/20  Yes Ryen Vasquez NP   metFORMIN ER (GLUCOPHAGE XR) 500 mg tablet Take 2 Tabs by mouth daily (with dinner). 6/25/20  Yes Dejah Castro MD   DM/acetaminophen/doxylamine (VICKS NYQUIL NIGHTTIME RELIEF PO) Take  by mouth. Yes Provider, Historical   ezetimibe (ZETIA) 10 mg tablet Take 1 Tab by mouth daily. 11/22/19  Yes Kimberly Lantigua MD   metoprolol succinate (TOPROL-XL) 50 mg XL tablet Take 1 Tab by mouth daily. 11/22/19  Yes Kimberly Lantigua MD   azelastine (ASTELIN) 137 mcg (0.1 %) nasal spray 2 Sprays by Both Nostrils route two (2) times daily as needed for Rhinitis (allergies). 11/22/19  Yes Kimberly Lantigua MD   prasugrel (EFFIENT) 10 mg tablet take 1 tablet by mouth once daily 4/24/19  Yes Provider, Historical   dextroamphetamine-amphetamine (ADDERALL) 10 mg tablet Take 1 Tab by mouth three (3) times daily as needed (ADHD). Max Daily Amount: 30 mg. 12/2/20   Dejah Castro MD   butalbital-acetaminophen-caffeine (FIORICET, ESGIC) -40 mg per tablet Take 1 Tab by mouth every six (6) hours as needed for Headache or Migraine. 12/2/20   Dejah Castro MD   dextroamphetamine-amphetamine (ADDERALL) 10 mg tablet Take 1 Tab by mouth three (3) times daily as needed (ADHD).  Max Daily Amount: 30 mg. 1/31/21   Dejah Castro MD   dextroamphetamine-amphetamine (ADDERALL) 10 mg tablet Take 1 Tab by mouth three (3) times daily as needed (ADHD). Max Daily Amount: 30 mg. Indications: attention deficit disorder with hyperactivity 1/1/21   Citlali Schulte MD   azelastine (OPTIVAR) 0.05 % ophthalmic solution Administer 1 Drop to both eyes two (2) times a day. Use in affected eye(s) 12/2/20   Citlali Schulte MD   evolocumab (Repatha SureClick) pen injection 1 mL by SubCUTAneous route every fourteen (14) days. Patient not taking: Reported on 12/2/2020 8/26/20   Citlali Schulte MD   guaiFENesin [facility]) 1,200 mg Ta12 ER tablet Take 1,200 mg by mouth two (2) times a day. Provider, Historical   beclomethasone (QVAR) 80 mcg/actuation aero Take 1 Puff by inhalation two (2) times a day. 11/22/19   Yumiko Jeffrey MD   cetirizine (ZYRTEC) 10 mg tablet Take 1 Tab by mouth daily. 11/22/19   Yumiko Jeffrey MD   atorvastatin (LIPITOR) 40 mg tablet Take 1 Tab by mouth daily. Patient not taking: Reported on 12/2/2020 1/28/19   Lisa Dobbins NP         ROS       A complete ROS was performed and negative except as noted in HPI     PHYSICAL EXAMINATION:    Vital Signs: There were no vitals taken for this visit. Patient-Reported Vitals 12/29/2020   Patient-Reported Temperature 97.  oral         Constitutional: [x] Appears well-developed and well-nourished [x] No apparent distress      Mental status: [x] Alert and awake  [x] Oriented [x] Able to follow commands       Eyes:   EOM    [x]  Normal      Sclera  [x]  Normal              Discharge [x]  None visible       HENT: [x] Normocephalic, atraumatic    [x] Mouth/Throat: Mucous membranes are moist    External Ears [x] Normal      Neck: [x] No visualized mass     Pulmonary/Chest: [x] Respiratory effort normal   [x] No visualized signs of difficulty breathing or respiratory distress    Musculoskeletal:  [x] Normal range of motion of neck    Neurological:        [x] No Facial Asymmetry (Cranial nerve 7 motor function) (limited exam due to video visit)          [x] No gaze palsy Skin:        [x] No significant exanthematous lesions or discoloration noted on facial skin             Psychiatric:       [x] Normal Affect       Other pertinent observable physical exam findings:  None. Prior labs reviewed. We discussed the expected course, resolution and complications of the diagnosis(es) in detail. Medication risks, benefits, costs, interactions, and alternatives were discussed as indicated. I advised her to contact the office if her condition worsens, changes or fails to improve as anticipated. She expressed understanding with the diagnosis(es) and plan. Carmelo Cortes is a 77 y.o. female who was evaluated by a video visit encounter for concerns as     Assessment & Plan:   Diagnoses and all orders for this visit:      ICD-10-CM ICD-9-CM    1. Chemical exposure  Z77.098 V87.2 REFERRAL TO ENT-OTOLARYNGOLOGY   2. Loss of taste  R43.2 781.1 REFERRAL TO ENT-OTOLARYNGOLOGY   3. Loss of smell  R43.0 781.1 REFERRAL TO ENT-OTOLARYNGOLOGY   4. Moderate persistent asthma without complication  Q07.35 075.56    5. Essential hypertension  I10 401.9 CBC WITH AUTOMATED DIFF   6. Type 2 diabetes mellitus without complication, without long-term current use of insulin (HCC)  E11.9 250.00 HEMOGLOBIN A1C WITH EAG   7. Vitamin D deficiency  E55.9 268.9 VITAMIN D, 25 HYDROXY   8. Mixed hyperlipidemia  E78.2 655.5 CK      METABOLIC PANEL, COMPREHENSIVE      LIPID PANEL   9. Tear duct infection, right  H04.301 373.11 sulfacetamide (BLEPH-10) 10 % ophthalmic solution   10. Attention deficit disorder (ADD) without hyperactivity  F98.8 314.00 dextroamphetamine-amphetamine (ADDERALL) 10 mg tablet      dextroamphetamine-amphetamine (ADDERALL) 10 mg tablet      dextroamphetamine-amphetamine (ADDERALL) 10 mg tablet     Follow-up and Dispositions    · Return in about 2 months (around 3/29/2021), or if symptoms worsen or fail to improve, for blood pressure, diabetes, cholesterol.         results and schedule of future studies reviewed with patient  reviewed diet, exercise and weight  cardiovascular risk and specific lipid/LDL goals reviewed  reviewed medications and side effects in detail    pred taper   Ref to ENT - [facility]aste [facility] Smell   Return to work    Refill adderall IR    AVS:  [x]  Available to patient in [address] after visit signed. []  Mailed to patient after visit. []  Not sent to patient after visit. Jodi Crowell is being evaluated by a Virtual Visit (video visit) encounter to address concerns as mentioned above. A caregiver was present when appropriate. Due to this being a TeleHealth encounter (During [facility] public health emergency), evaluation of the following organ systems was limited: Vitals/Constitutional/EENT/Resp/CV/GI//MS/Neuro/Skin/Heme-Lymph-Imm. Pursuant to the emergency declaration under the [address] [place] [address], [address] [place] [address] authority and the [employer] and [employer] Act, this Virtual Visit was conducted with patient's (and/or legal guardian's) consent, to reduce the patient's risk of exposure to COVID-19 and provide necessary medical care. The patient (and/or legal guardian) has also been advised to contact this office for worsening conditions or problems, and seek emergency medical treatment and/or call 911 if deemed necessary. Patient identification was verified at the start of the visit: YES. Services were provided through a video synchronous discussion virtually to substitute for in-person clinic visit. Patient was located at their individual home (or other location as per patient preference). Provider was located in medical office. An electronic signature was used to authenticate this note.   -- Willian Doyle MD

## 2021-01-29 NOTE — TELEPHONE ENCOUNTER
Labs ordered for lab collect    Please mail order form to pt and/or fax to her [facility] location in [place]

## 2021-01-29 NOTE — PROGRESS NOTES
This nurse called and spoke with patient. Patient states that Monday while she was at work someone had a perfume on that she feels she may be allergic to because she developed a migraine and was vomiting Monday. Patient states that she continued to have nausea throughout the week along with her migraine and stomach aches. Patient states that she has been out of work since Monday not feeling well.

## 2021-01-29 NOTE — LETTER
NOTIFICATION RETURN TO WORK   
 
1/29/2021 Ms. Martínez Hoffman 3 [facility] [phone] To Whom It May Concern: 
 
Martínez Hoffman is currently under the care of [facility]. She will return to work/school on: 2/1/21 If there are questions or concerns please have the patient contact our office. Sincerely, Alexandr Fox MD

## 2021-02-01 ENCOUNTER — TELEPHONE (OUTPATIENT)
Dept: INTERNAL MEDICINE CLINIC | Age: 66
End: 2021-02-01

## 2021-02-01 NOTE — TELEPHONE ENCOUNTER
[employer] [facility] calling concerning her 90 day supply of  Adderall 10 mg the direction was not clear on the prescription sent for 3/31. Please make the 3/31perscription clear and sent back.

## 2021-02-02 ENCOUNTER — TELEPHONE (OUTPATIENT)
Dept: INTERNAL MEDICINE CLINIC | Age: 66
End: 2021-02-02

## 2021-02-03 DIAGNOSIS — F98.8 ATTENTION DEFICIT DISORDER (ADD) WITHOUT HYPERACTIVITY: ICD-10-CM

## 2021-02-03 RX ORDER — DEXTROAMPHETAMINE SACCHARATE, AMPHETAMINE ASPARTATE, DEXTROAMPHETAMINE SULFATE AND AMPHETAMINE SULFATE 2.5; 2.5; 2.5; 2.5 MG/1; MG/1; MG/1; MG/1
10 TABLET ORAL 3 TIMES DAILY
Qty: 90 TAB | Refills: 0 | Status: SHIPPED | OUTPATIENT
Start: 2021-02-03 | End: 2021-08-05 | Stop reason: SDUPTHER

## 2021-03-16 ENCOUNTER — VIRTUAL VISIT (OUTPATIENT)
Dept: INTERNAL MEDICINE CLINIC | Age: 66
End: 2021-03-16
Payer: COMMERCIAL

## 2021-03-16 DIAGNOSIS — I10 ESSENTIAL HYPERTENSION: ICD-10-CM

## 2021-03-16 DIAGNOSIS — J45.21 MILD INTERMITTENT ASTHMATIC BRONCHITIS WITH ACUTE EXACERBATION: ICD-10-CM

## 2021-03-16 DIAGNOSIS — R43.0 LOSS OF SMELL: ICD-10-CM

## 2021-03-16 DIAGNOSIS — J20.9 BRONCHITIS, ACUTE, WITH BRONCHOSPASM: Primary | ICD-10-CM

## 2021-03-16 DIAGNOSIS — J30.2 SEASONAL ALLERGIC RHINITIS, UNSPECIFIED TRIGGER: ICD-10-CM

## 2021-03-16 DIAGNOSIS — E11.9 TYPE 2 DIABETES MELLITUS WITHOUT COMPLICATION, WITHOUT LONG-TERM CURRENT USE OF INSULIN (HCC): ICD-10-CM

## 2021-03-16 DIAGNOSIS — F32.A ANXIETY AND DEPRESSION: ICD-10-CM

## 2021-03-16 DIAGNOSIS — F41.9 ANXIETY AND DEPRESSION: ICD-10-CM

## 2021-03-16 DIAGNOSIS — E78.2 MIXED HYPERLIPIDEMIA: ICD-10-CM

## 2021-03-16 PROBLEM — R43.2 LOSS OF TASTE: Status: ACTIVE | Noted: 2021-03-16

## 2021-03-16 PROCEDURE — 99214 OFFICE O/P EST MOD 30 MIN: CPT | Performed by: INTERNAL MEDICINE

## 2021-03-16 RX ORDER — AMOXICILLIN AND CLAVULANATE POTASSIUM 875; 125 MG/1; MG/1
1 TABLET, FILM COATED ORAL 2 TIMES DAILY
Qty: 20 TAB | Refills: 0 | Status: SHIPPED | OUTPATIENT
Start: 2021-03-16 | End: 2021-03-26

## 2021-03-16 RX ORDER — PREDNISONE 10 MG/1
TABLET ORAL
Qty: 21 TAB | Refills: 0 | Status: SHIPPED | OUTPATIENT
Start: 2021-03-16 | End: 2021-03-30 | Stop reason: SDUPTHER

## 2021-03-16 RX ORDER — ESCITALOPRAM OXALATE 20 MG/1
TABLET ORAL
Qty: 60 TAB | Refills: 5 | Status: SHIPPED | OUTPATIENT
Start: 2021-03-16 | End: 2022-02-28

## 2021-03-16 NOTE — LETTER
NOTIFICATION RETURN TO WORK   
 
3/16/2021 11:59 AM 
 
Ms. Effie Rocha 3 [facility] [phone] To Whom It May Concern: 
 
Efife Rocha is currently under the care of [facility]. She will return to work  on: 3/22/21 If there are questions or concerns please have the patient contact our office. Sincerely, Liam Kulkarni MD

## 2021-03-16 NOTE — PROGRESS NOTES
Virtual Visit    Chief Complaint   Patient presents with    Cold Symptoms     Patient present for vv with concerns of possible pneumonia. Patient reports having fever of 99, phlegm, sneezing, coughing, and post nasal drainage that started on 3/11/21. Reports taking several COVID tests all results negative    1. Have you been to the ER, urgent care clinic since your last visit? Hospitalized since your last visit? No    2. Have you seen or consulted any other health care providers outside of the [address] [place] [address] since your last visit? Include any pap smears or colon screening. No    Health Maintenance Due   Topic Date Due    Hepatitis C Screening  Never done    COVID-19 Vaccine (1) Never done    DTaP/Tdap/Td series (1 - Tdap) Never done    Shingrix Vaccine Age 50> (1 of 2) Never done    Colorectal Cancer Screening Combo  03/19/2016    Eye Exam Retinal or Dilated  03/19/2017    GLAUCOMA SCREENING Q2Y  Never done    Bone Densitometry (Dexa) Screening  Never done    Pneumococcal 65+ years (1 of 1 - PPSV23) Never done    Breast Cancer Screen Mammogram  02/03/2019    MICROALBUMIN Q1  01/28/2020    Foot Exam Q1  05/22/2020       Learning Assessment 5/22/2019   PRIMARY LEARNER Patient   BARRIERS PRIMARY LEARNER NONE   PRIMARY LANGUAGE ENGLISH   LEARNER PREFERENCE PRIMARY READING     -     -   ANSWERED BY patient   RELATIONSHIP SELF     AVS  education, follow up, and recommendations provided and addressed with patient.   services used to advise patient No.

## 2021-03-16 NOTE — PROGRESS NOTES
Karo Montenegro (: 1955) is a 77 y.o. female, established patient, here for evaluation of the following chief complaint(s)--see below:    Karo Montenegro is a 77 y.o. female who was seen by synchronous (real-time) audio-video technology on 3/16/2021. Consent: Karo Montenegro, who was seen by synchronous (real-time) audio-video technology, and/or her healthcare decision maker, is aware that this patient-initiated, Telehealth encounter on 3/16/2021 is a billable service, with coverage as determined by her insurance carrier. She is aware that she may receive a bill and has provided verbal consent to proceed: Yes. I was in the office while conducting this encounter. Assessment & Plan:   Diagnoses and all orders for this visit:      ICD-10-CM ICD-9-CM    1. Bronchitis, acute, with bronchospasm  J20.9 466.0    2. Loss of smell  R43.0 781.1 amoxicillin-clavulanate (AUGMENTIN) 875-125 mg per tablet   3. Mild intermittent asthmatic bronchitis with acute exacerbation  J45.21 493.92    4. Essential hypertension  I10 401.9    5. Type 2 diabetes mellitus without complication, without long-term current use of insulin (HCC)  E11.9 250.00    6. Mixed hyperlipidemia  E78.2 272.2    7. Seasonal allergic rhinitis, unspecified trigger  J30.2 477.9      Follow-up and Dispositions    · Return in about 2 months (around 2021), or if symptoms worsen or fail to improve, for asthma. results and schedule of future studies reviewed with patient  reviewed diet, exercise and weight   cardiovascular risk and specific lipid/LDL goals reviewed  reviewed medications and side effects in detail    augmentin  pred taper  CXR if sx persist  Resume flonase and astelin  Letter for return to work next Monday    AVS:  [x]  Available to patient in [address] after visit signed. []  Mailed to patient after visit. []  Not sent to patient after visit.       Subjective:   Karo Montenegro was seen for:  Chief Complaint   Patient presents with    Cold Symptoms     Notes:  Several days of cough, mild temp elevation to 99, sneeze, +PND  +sputum production, but feels as if needs to bring more up. Reduced appetite. Pt concerned re: pneumonia  +AVILEZ, less lung capacity felt. Urgent care was busy, so pt deferred    mucinex, neb use, tylenol, nyquil  Sx a little better but not resolving    Not yet using flonase and astelin this season    Left work at 12 noon last Thursday - out since. Nursing screenings reviewed by provider at visit. Past medical, Social, and Family history reviewed  Medications reviewed and updated. Allergies   Allergen Reactions    Codeine Other (comments)     Severe headaches    Erythromycin Rash    Morphine Hives     No longer an allergy per patient     Neomycin Rash       Prior to Admission medications    Medication Sig Start Date End Date Taking? Authorizing Provider   albuterol (ProAir HFA) 90 mcg/actuation inhaler Take 2 Puffs by inhalation every four (4) hours as needed for Wheezing. 12/2/20  Yes Citlali Schulte MD   fluticasone propion-salmeteroL (ADVAIR/WIXELA) 250-50 mcg/dose diskus inhaler Take 1 Puff by inhalation two (2) times a day. 12/2/20  Yes Citlali Schulte MD   montelukast (SINGULAIR) 10 mg tablet TAKE 1 TABLET BY MOUTH DAILY 12/2/20  Yes Citlali Schulte MD   hydroCHLOROthiazide (HYDRODIURIL) 25 mg tablet Take 1 Tab by mouth daily. 12/2/20  Yes Citlali Schulte MD   dextroamphetamine-amphetamine (ADDERALL) 10 mg tablet Take 1 Tab by mouth three (3) times daily as needed (ADHD). Max Daily Amount: 30 mg. 1/31/21  Yes Citlali Schulte MD   azelastine (OPTIVAR) 0.05 % ophthalmic solution Administer 1 Drop to both eyes two (2) times a day. Use in affected eye(s) 12/2/20  Yes Citlali Schulte MD   albuterol (PROVENTIL VENTOLIN) 2.5 mg /3 mL (0.083 %) nebu 3 mL by Nebulization route every four (4) hours as needed for Cough (wheezing, SOB).  8/3/20  Yes Citlali Schulte MD escitalopram oxalate (LEXAPRO) 20 mg tablet Take 2 tablets by mouth once daily 8/3/20  Yes Jak Askew MD   metFORMIN ER (GLUCOPHAGE XR) 500 mg tablet Take 2 Tabs by mouth daily (with dinner). 6/25/20  Yes Jak Askew MD   DM/acetaminophen/doxylamine (VICKS NYQUIL NIGHTTIME RELIEF PO) Take  by mouth. Yes Provider, Historical   guaiFENesin (MUCINEX) 1,200 mg Ta12 ER tablet Take 1,200 mg by mouth two (2) times a day. Yes Provider, Historical   ezetimibe (ZETIA) 10 mg tablet Take 1 Tab by mouth daily. 11/22/19  Yes Deborah Blackman MD   azelastine (ASTELIN) 137 mcg (0.1 %) nasal spray 2 Sprays by Both Nostrils route two (2) times daily as needed for Rhinitis (allergies). 11/22/19  Yes Deborah Blackman MD   dextroamphetamine-amphetamine (ADDERALL) 10 mg tablet Take 1 Tab by mouth three (3) times daily. Max Daily Amount: 30 mg. Indications: attention deficit disorder with hyperactivity 2/3/21   Elsie HARDY NP   predniSONE (DELTASONE) 10 mg tablet Taper daily. 60mg x1, 50mg x 1, 40mg x 1, 30mg x 1, 20mg x 1, 10mg x 1 1/29/21   Jak Askew MD   dextroamphetamine-amphetamine (ADDERALL) 10 mg tablet Take 10mg as directed. May take up to 3 tablets by mouth in 24hr period. 3/31/21   Jak Askew MD   dextroamphetamine-amphetamine (ADDERALL) 10 mg tablet Take 1 Tab by mouth three (3) times daily as needed (ADHD). Max Daily Amount: 30 mg. 3/1/21   Jak Askew MD   butalbital-acetaminophen-caffeine (FIORICET, ESGIC) -40 mg per tablet Take 1 Tab by mouth every six (6) hours as needed for Headache or Migraine. 12/2/20   Jak Askew MD   dextroamphetamine-amphetamine (ADDERALL) 10 mg tablet Take 1 Tab by mouth three (3) times daily as needed (ADHD). Max Daily Amount: 30 mg.  Indications: attention deficit disorder with hyperactivity 1/1/21   Jak Askew MD   evolocumab (Repatha SureClick) pen injection 1 mL by SubCUTAneous route every fourteen (14) days.  Patient not taking: Reported on 12/2/2020 8/26/20   Lorena Fisher MD   metoprolol succinate (TOPROL-XL) 50 mg XL tablet Take 1 Tab by mouth daily. 11/22/19   Mona Yeager MD   beclomethasone (QVAR) 80 mcg/actuation aero Take 1 Puff by inhalation two (2) times a day. 11/22/19   Mona Yeager MD   cetirizine (ZYRTEC) 10 mg tablet Take 1 Tab by mouth daily. 11/22/19   Mona Yeager MD   prasugrel (EFFIENT) 10 mg tablet take 1 tablet by mouth once daily 4/24/19   Provider, Historical   atorvastatin (LIPITOR) 40 mg tablet Take 1 Tab by mouth daily. Patient not taking: Reported on 12/2/2020 1/28/19   Neema Trevino NP         ROS    A complete ROS was performed and negative except as noted in HPI     PHYSICAL EXAMINATION:    Vital Signs: There were no vitals taken for this visit. Patient-Reported Vitals 12/29/2020   Patient-Reported Temperature 97. oral         Constitutional: [x] Appears well-developed and well-nourished [x] No apparent distress      Mental status: [x] Alert and awake  [x] Oriented [x] Able to follow commands       Eyes:   EOM    [x]  Normal      Sclera  [x]  Normal              Discharge [x]  None visible       HENT: [x] Normocephalic, atraumatic    [x] Mouth/Throat: Mucous membranes are moist    External Ears [x] Normal      Neck: [x] No visualized mass     Pulmonary/Chest: [x] Respiratory effort normal   [x] No visualized signs of difficulty breathing or respiratory distress    Musculoskeletal:  [x] Normal range of motion of neck    Neurological:        [x] No Facial Asymmetry (Cranial nerve 7 motor function) (limited exam due to video visit)          [x] No gaze palsy     Skin:        [x] No significant exanthematous lesions or discoloration noted on facial skin             Psychiatric:       [x] Normal Affect       Other pertinent observable physical exam findings:  None.       We discussed the expected course, resolution and complications of the diagnosis(es) in detail. Medication risks, benefits, costs, interactions, and alternatives were discussed as indicated. I advised her to contact the office if her condition worsens, changes or fails to improve as anticipated. She expressed understanding with the diagnosis(es) and plan. Jodi Crowell is a 77 y.o. female who was evaluated by a video visit encounter for concerns as above. Jodi Crowell is being evaluated by a Virtual Visit (video visit) encounter to address concerns as mentioned above. A caregiver was present when appropriate. Due to this being a TeleHealth encounter ([facility]), evaluation of the following organ systems was limited: Vitals/Constitutional/EENT/Resp/CV/GI//MS/Neuro/Skin/Heme-Lymph-Imm. Pursuant to the emergency declaration under the [address] [place] [address] authority and the [employer] and [employer] Act, this Virtual Visit was conducted with patient's (and/or legal guardian's) consent, to reduce the patient's risk of exposure to COVID-19 and provide necessary medical care. The patient (and/or legal guardian) has also been advised to contact this office for worsening conditions or problems, and seek emergency medical treatment and/or call 911 if deemed necessary. Patient identification was verified at the start of the visit: YES. Services were provided through a video synchronous discussion virtually to substitute for in-person clinic visit. Patient was located at their individual home (or other location as per patient preference). Provider was located in medical office. An electronic signature was used to authenticate this note.   -- Willian Doyle MD

## 2021-03-17 ENCOUNTER — TELEPHONE (OUTPATIENT)
Dept: INTERNAL MEDICINE CLINIC | Age: 66
End: 2021-03-17

## 2021-03-19 ENCOUNTER — TELEPHONE (OUTPATIENT)
Dept: INTERNAL MEDICINE CLINIC | Age: 66
End: 2021-03-19

## 2021-03-19 DIAGNOSIS — B37.31 VAGINAL CANDIDIASIS: Primary | ICD-10-CM

## 2021-03-19 RX ORDER — FLUCONAZOLE 150 MG/1
150 TABLET ORAL ONCE
Qty: 2 TAB | Refills: 0 | Status: SHIPPED | OUTPATIENT
Start: 2021-03-19 | End: 2021-03-19

## 2021-03-19 NOTE — TELEPHONE ENCOUNTER
Form completed by provider 3/18/21, scanned into cc. Notified patient form was completed and pt needs to complete her portion.  Pt requested form be sent back to [phone], pt states she will complete her section once form is faxed back

## 2021-03-30 ENCOUNTER — OFFICE VISIT (OUTPATIENT)
Dept: INTERNAL MEDICINE CLINIC | Age: 66
End: 2021-03-30
Payer: COMMERCIAL

## 2021-03-30 VITALS
HEART RATE: 123 BPM | BODY MASS INDEX: 31.1 KG/M2 | DIASTOLIC BLOOD PRESSURE: 86 MMHG | OXYGEN SATURATION: 93 % | WEIGHT: 169 LBS | TEMPERATURE: 96.6 F | SYSTOLIC BLOOD PRESSURE: 155 MMHG | RESPIRATION RATE: 16 BRPM | HEIGHT: 62 IN

## 2021-03-30 DIAGNOSIS — J20.9 BRONCHITIS, ACUTE, WITH BRONCHOSPASM: ICD-10-CM

## 2021-03-30 DIAGNOSIS — Z00.00 WELL WOMAN EXAM (NO GYNECOLOGICAL EXAM): Primary | ICD-10-CM

## 2021-03-30 DIAGNOSIS — M81.0 AGE-RELATED OSTEOPOROSIS WITHOUT CURRENT PATHOLOGICAL FRACTURE: ICD-10-CM

## 2021-03-30 DIAGNOSIS — Z11.59 NEED FOR HEPATITIS C SCREENING TEST: ICD-10-CM

## 2021-03-30 DIAGNOSIS — J30.2 SEASONAL ALLERGIC RHINITIS, UNSPECIFIED TRIGGER: ICD-10-CM

## 2021-03-30 DIAGNOSIS — Z12.31 ENCOUNTER FOR SCREENING MAMMOGRAM FOR BREAST CANCER: ICD-10-CM

## 2021-03-30 DIAGNOSIS — E78.2 MIXED HYPERLIPIDEMIA: ICD-10-CM

## 2021-03-30 DIAGNOSIS — J45.40 MODERATE PERSISTENT ASTHMA WITHOUT COMPLICATION: ICD-10-CM

## 2021-03-30 DIAGNOSIS — E55.9 VITAMIN D DEFICIENCY: ICD-10-CM

## 2021-03-30 DIAGNOSIS — E11.9 TYPE 2 DIABETES MELLITUS WITHOUT COMPLICATION, WITHOUT LONG-TERM CURRENT USE OF INSULIN (HCC): ICD-10-CM

## 2021-03-30 PROCEDURE — 99215 OFFICE O/P EST HI 40 MIN: CPT | Performed by: INTERNAL MEDICINE

## 2021-03-30 PROCEDURE — 99397 PER PM REEVAL EST PAT 65+ YR: CPT | Performed by: INTERNAL MEDICINE

## 2021-03-30 RX ORDER — LISINOPRIL 10 MG/1
10 TABLET ORAL DAILY
Qty: 30 TAB | Refills: 5 | Status: SHIPPED | OUTPATIENT
Start: 2021-03-30 | End: 2021-12-23 | Stop reason: SINTOL

## 2021-03-30 RX ORDER — PREDNISONE 10 MG/1
TABLET ORAL
Qty: 21 TAB | Refills: 0 | Status: SHIPPED | OUTPATIENT
Start: 2021-03-30 | End: 2022-01-04

## 2021-03-30 NOTE — PROGRESS NOTES
HPI:  established patient  Presents for f/u bronchitis, etc    Recent general weakness   Feeling better today, though    Pt has paperwork to fill out - out of work due to resp illness from 3/1/21 through today    Pt inquires re: repeat pred for resp status        Past medical, Social, and Family history reviewed    Prior to Admission medications    Medication Sig Start Date End Date Taking? Authorizing Provider   escitalopram oxalate (LEXAPRO) 20 mg tablet Take 2 tablets by mouth once daily 3/16/21  Yes Thierno Shelton MD   dextroamphetamine-amphetamine (ADDERALL) 10 mg tablet Take 1 Tab by mouth three (3) times daily as needed (ADHD). Max Daily Amount: 30 mg. 3/1/21  Yes Thierno Shelton MD   albuterol (ProAir HFA) 90 mcg/actuation inhaler Take 2 Puffs by inhalation every four (4) hours as needed for Wheezing. 12/2/20  Yes Thierno Shelton MD   fluticasone propion-salmeteroL (ADVAIR/WIXELA) 250-50 mcg/dose diskus inhaler Take 1 Puff by inhalation two (2) times a day. 12/2/20  Yes Thierno Shelton MD   montelukast (SINGULAIR) 10 mg tablet TAKE 1 TABLET BY MOUTH DAILY 12/2/20  Yes Thierno Shelton MD   butalbital-acetaminophen-caffeine (FIORICET, ESGIC) -40 mg per tablet Take 1 Tab by mouth every six (6) hours as needed for Headache or Migraine. 12/2/20  Yes Thierno Shelton MD   azelastine (OPTIVAR) 0.05 % ophthalmic solution Administer 1 Drop to both eyes two (2) times a day. Use in affected eye(s) 12/2/20  Yes Thierno Shelton MD   albuterol (PROVENTIL VENTOLIN) 2.5 mg /3 mL (0.083 %) nebu 3 mL by Nebulization route every four (4) hours as needed for Cough (wheezing, SOB). 8/3/20  Yes Thierno Shelton MD   metFORMIN ER (GLUCOPHAGE XR) 500 mg tablet Take 2 Tabs by mouth daily (with dinner). 6/25/20  Yes Thierno Shelton MD   cetirizine (ZYRTEC) 10 mg tablet Take 1 Tab by mouth daily.  11/22/19  Yes Tatiana Jacobo MD   azelastine (ASTELIN) 137 mcg (0.1 %) nasal spray 2 Sprays by Both Nostrils route two (2) times daily as needed for Rhinitis (allergies). 11/22/19  Yes Stephen Benson MD   predniSONE (DELTASONE) 10 mg tablet Taper daily. 60mg x1, 50mg x 1, 40mg x 1, 30mg x 1, 20mg x 1, 10mg x 1 3/16/21   Nancy Hale MD   dextroamphetamine-amphetamine (ADDERALL) 10 mg tablet Take 1 Tab by mouth three (3) times daily. Max Daily Amount: 30 mg. Indications: attention deficit disorder with hyperactivity 2/3/21   Wei Sorenson NP   dextroamphetamine-amphetamine (ADDERALL) 10 mg tablet Take 10mg as directed. May take up to 3 tablets by mouth in 24hr period. 3/31/21   Nancy Hale MD   hydroCHLOROthiazide (HYDRODIURIL) 25 mg tablet Take 1 Tab by mouth daily. 12/2/20   Nancy Hale MD   dextroamphetamine-amphetamine (ADDERALL) 10 mg tablet Take 1 Tab by mouth three (3) times daily as needed (ADHD). Max Daily Amount: 30 mg. 1/31/21   Nancy Hale MD   dextroamphetamine-amphetamine (ADDERALL) 10 mg tablet Take 1 Tab by mouth three (3) times daily as needed (ADHD). Max Daily Amount: 30 mg. Indications: attention deficit disorder with hyperactivity 1/1/21   Nancy Hale MD   evolocumab (Repatha SureClick) pen injection 1 mL by SubCUTAneous route every fourteen (14) days. Patient not taking: Reported on 12/2/2020 8/26/20   Nancy Hale MD   DM/acetaminophen/doxylamine (VICKS NYQUIL NIGHTTIME RELIEF PO) Take  by mouth. Provider, Historical   guaiFENesin (MUCINEX) 1,200 mg Ta12 ER tablet Take 1,200 mg by mouth two (2) times a day. Provider, Historical   ezetimibe (ZETIA) 10 mg tablet Take 1 Tab by mouth daily. 11/22/19   Stephen Benson MD   metoprolol succinate (TOPROL-XL) 50 mg XL tablet Take 1 Tab by mouth daily. 11/22/19   Stephen Benson MD   beclomethasone (QVAR) 80 mcg/actuation aero Take 1 Puff by inhalation two (2) times a day.  11/22/19   Stephen Benson MD   prasugrel (EFFIENT) 10 mg tablet take 1 tablet by mouth once daily 4/24/19   Provider, Historical   atorvastatin (LIPITOR) 40 mg tablet Take 1 Tab by mouth daily. Patient not taking: Reported on 12/2/2020 1/28/19   Neetu Franks NP          ROS  Complete ROS reviewed and negative or stable except as noted in HPI. Physical Exam  Vitals signs and nursing note reviewed. Constitutional:       General: She is not in acute distress. HENT:      Head: Normocephalic and atraumatic. Mouth/Throat:      Pharynx: No oropharyngeal exudate. Eyes:      General: No scleral icterus. Pupils: Pupils are equal, round, and reactive to light. Neck:      Musculoskeletal: Normal range of motion and neck supple. Thyroid: No thyromegaly. Vascular: No JVD. Cardiovascular:      Rate and Rhythm: Regular rhythm. Tachycardia present. Heart sounds: Normal heart sounds. No murmur. No friction rub. No gallop. Pulmonary:      Effort: Pulmonary effort is normal. No respiratory distress. Breath sounds: Normal breath sounds. No wheezing or rales. Abdominal:      General: Bowel sounds are normal. There is no distension. Palpations: Abdomen is soft. Tenderness: There is no abdominal tenderness. Musculoskeletal: Normal range of motion. Lymphadenopathy:      Cervical: No cervical adenopathy. Skin:     General: Skin is warm. Findings: No rash. Neurological:      Mental Status: She is alert and oriented to person, place, and time. Motor: No abnormal muscle tone. Coordination: Coordination normal.           Prior labs reviewed. Assessment/Plan:    ICD-10-CM ICD-9-CM    1. Moderate persistent asthma without complication  E20.33 790.65    2. Bronchitis, acute, with bronchospasm  J20.9 466.0 CBC WITH AUTOMATED DIFF      CBC WITH AUTOMATED DIFF      predniSONE (DELTASONE) 10 mg tablet   3.  Type 2 diabetes mellitus without complication, without long-term current use of insulin (HCC)  E11.9 250.00 HEMOGLOBIN A1C WITH EAG      MICROALBUMIN, UR, RAND W/ MICROALB/CREAT RATIO      HEMOGLOBIN A1C WITH EAG      MICROALBUMIN, UR, RAND W/ MICROALB/CREAT RATIO   4. Mixed hyperlipidemia  E78.2 599.6 CK      METABOLIC PANEL, COMPREHENSIVE      LIPID PANEL      CK      METABOLIC PANEL, COMPREHENSIVE      LIPID PANEL   5. Seasonal allergic rhinitis, unspecified trigger  J30.2 477.9    6. Age-related osteoporosis without current pathological fracture  M81.0 733.01 DEXA BONE DENSITY STUDY AXIAL   7. Encounter for screening mammogram for breast cancer  Z12.31 V76.12 JOHNNY 3D YASIR W MAMMO BI SCREENING INCL CAD   8. Need for hepatitis C screening test  Z11.59 V73.89 HCV AB W/RFLX TO CHRISSIE      HCV AB W/RFLX TO CHRISSIE   9. Vitamin D deficiency  E55.9 268.9 VITAMIN D, 25 HYDROXY      VITAMIN D, 25 HYDROXY   10. Well woman exam (no gynecological exam)  Z00.00 V70.0     [V70.0]     Follow-up and Dispositions    · Return in about 3 months (around 6/30/2021), or if symptoms worsen or fail to improve, for diabetes, blood pressure, asthma. results and schedule of future studies reviewed with patient  reviewed diet, exercise and weight   cardiovascular risk and specific lipid/LDL goals reviewed  reviewed medications and side effects in detail    Resume lisinopril  Labs at [address] for FMLA revised  pred taper   Continue other current medications     An electronic signature was used to authenticate this note.   -- Ilana Frank MD

## 2021-03-30 NOTE — PROGRESS NOTES
RM: 15    Chief Complaint   Patient presents with    Complete Physical    Cough     bronchitis    Pain (Chronic)     neck and shoulders      Visit Vitals  BP (!) 155/86 (BP 1 Location: Left upper arm, BP Patient Position: Sitting, BP Cuff Size: Adult)   Pulse (!) 123   Temp (!) 96.6 °F (35.9 °C) (Temporal)   Resp 16   Ht 5' 2\" (1.575 m)   Wt 169 lb (76.7 kg)   SpO2 93%   BMI 30.91 kg/m²     Recent Travel Screening and Travel History documentation     Travel Screening     Question   Response    In the last month, have you been in contact with someone who was confirmed or suspected to have Coronavirus / COVID-19? No / Unsure    Have you had a COVID-19 viral test in the last 14 days? No    Do you have any of the following new or worsening symptoms? Cough (pt trying to get over bronchitis)    Have you traveled internationally or domestically in the last month? No      Travel History   Travel since 02/28/21     No documented travel since 02/28/21        3 most recent PHQ Screens 12/2/2020   Little interest or pleasure in doing things Not at all   Feeling down, depressed, irritable, or hopeless Several days   Total Score PHQ 2 1   Trouble falling or staying asleep, or sleeping too much -   Feeling tired or having little energy -   Poor appetite, weight loss, or overeating -   Feeling bad about yourself - or that you are a failure or have let yourself or your family down -   Trouble concentrating on things such as school, work, reading, or watching TV -   Moving or speaking so slowly that other people could have noticed; or the opposite being so fidgety that others notice -   Thoughts of being better off dead, or hurting yourself in some way -   PHQ 9 Score -   How difficult have these problems made it for you to do your work, take care of your home and get along with others -            1. Have you been to the ER, urgent care clinic since your last visit? Hospitalized since your last visit? No    2.  Have you seen or consulted any other health care providers outside of the [address] [place] [address] since your last visit? Include any pap smears or colon screening.  No     Health Maintenance Due   Topic Date Due    Hepatitis C Screening  Never done    COVID-19 Vaccine (1) Never done    DTaP/Tdap/Td series (1 - Tdap) Never done    Shingrix Vaccine Age 50> (1 of 2) Never done    Colorectal Cancer Screening Combo  03/19/2016    Eye Exam Retinal or Dilated  03/19/2017    Bone Densitometry (Dexa) Screening  Never done    Pneumococcal 65+ years (1 of 1 - PPSV23) Never done    Breast Cancer Screen Mammogram  02/03/2019    MICROALBUMIN Q1  01/28/2020    Foot Exam Q1  05/22/2020        Learning Assessment 5/22/2019   PRIMARY LEARNER Patient   BARRIERS PRIMARY LEARNER NONE   PRIMARY LANGUAGE ENGLISH   LEARNER PREFERENCE PRIMARY READING     -     -   ANSWERED BY patient   RELATIONSHIP SELF

## 2021-03-30 NOTE — PROGRESS NOTES
Subjective:   77 y.o. female for Well Woman Check. Her gyne and breast care is done elsewhere by her Ob-Gyne physician. Past medical, Social, and Family history reviewed  Medications reviewed and updated. ROS: Feeling generally well. No TIA's or unusual headaches, no dysphagia. No prolonged cough. No dyspnea or chest pain on exertion. No abdominal pain, change in bowel habits, black or bloody stools. No urinary tract symptoms. No new or unusual musculoskeletal symptoms. Specific concerns today:     See other. Objective: The patient appears well, alert, oriented x 3, in no distress. Visit Vitals  BP (!) 155/86 (BP 1 Location: Left upper arm, BP Patient Position: Sitting, BP Cuff Size: Adult)   Pulse (!) 123   Temp (!) 96.6 °F (35.9 °C) (Temporal)   Resp 16   Ht 5' 2\" (1.575 m)   Wt 169 lb (76.7 kg)   SpO2 93%   BMI 30.91 kg/m²     ENT normal.  Neck supple. No adenopathy or thyromegaly. KARL. Lungs are clear, good air entry, no wheezes, rhonchi or rales. S1 and S2 normal, no murmurs, regular rate and rhythm. Abdomen soft without tenderness, guarding, mass or organomegaly. Extremities show no edema, normal peripheral pulses. Neurological is normal, no focal findings. Breast and Pelvic exams are deferred. Prior labs reviewed. Assessment/Plan:   Well Woman  follow low fat diet, routine labs ordered, call if any problems    ICD-10-CM ICD-9-CM    1. Well woman exam (no gynecological exam)  Z00.00 V70.0     [V70.0]   2. Bronchitis, acute, with bronchospasm  J20.9 466.0 CBC WITH AUTOMATED DIFF      CBC WITH AUTOMATED DIFF      predniSONE (DELTASONE) 10 mg tablet   3. Type 2 diabetes mellitus without complication, without long-term current use of insulin ([facility])  E11.9 250.00 HEMOGLOBIN A1C WITH EAG      MICROALBUMIN, UR, RAND W/ MICROALB/CREAT RATIO      HEMOGLOBIN A1C WITH EAG      MICROALBUMIN, UR, RAND W/ MICROALB/CREAT RATIO   4.  Mixed hyperlipidemia  E78.2 840.2 CK      METABOLIC PANEL, COMPREHENSIVE      LIPID PANEL      CK      METABOLIC PANEL, COMPREHENSIVE      LIPID PANEL   5. Seasonal allergic rhinitis, unspecified trigger  J30.2 477.9    6. Moderate persistent asthma without complication  F04.15 327.32    7. Age-related osteoporosis without current pathological fracture  M81.0 733.01 DEXA BONE DENSITY STUDY AXIAL   8. Encounter for screening mammogram for breast cancer  Z12.31 V76.12 JOHNNY 3D YASIR W MAMMO BI SCREENING INCL CAD   9. Need for hepatitis C screening test  Z11.59 V73.89 HCV AB W/RFLX TO CHRISSIE      HCV AB W/RFLX TO CHRISSIE   10. Vitamin D deficiency  E55.9 268.9 VITAMIN D, 25 HYDROXY      VITAMIN D, 25 HYDROXY     Follow-up and Dispositions    · Return in about 3 months (around 6/30/2021), or if symptoms worsen or fail to improve, for diabetes, blood pressure, asthma.         results and schedule of future studies reviewed with patient  reviewed diet, exercise and weight   cardiovascular risk and specific lipid/LDL goals reviewed  reviewed medications and side effects in detail     Look up pneumovax 23 and neomycin in it  COVID vaccine  Pt defers colonoscopy

## 2021-06-09 DIAGNOSIS — J45.42 MODERATE PERSISTENT ASTHMA WITH STATUS ASTHMATICUS: ICD-10-CM

## 2021-06-09 RX ORDER — MONTELUKAST SODIUM 10 MG/1
TABLET ORAL
Qty: 90 TABLET | Refills: 1 | Status: SHIPPED | OUTPATIENT
Start: 2021-06-09 | End: 2021-12-23 | Stop reason: SDUPTHER

## 2021-06-14 ENCOUNTER — DOCUMENTATION ONLY (OUTPATIENT)
Dept: INTERNAL MEDICINE CLINIC | Age: 66
End: 2021-06-14

## 2021-06-14 NOTE — PROGRESS NOTES
Attempted to contact pt on her work number [phone] and mobile phone [phone]. Did not get an answer on both lines and message not left because mailbox was unsecured on mobile phone.

## 2021-08-02 ENCOUNTER — PATIENT MESSAGE (OUTPATIENT)
Dept: INTERNAL MEDICINE CLINIC | Age: 66
End: 2021-08-02

## 2021-08-02 DIAGNOSIS — F98.8 ATTENTION DEFICIT DISORDER (ADD) WITHOUT HYPERACTIVITY: ICD-10-CM

## 2021-08-02 NOTE — TELEPHONE ENCOUNTER
----- Message from Jimi Colvin sent at 8/2/2021 10:05 AM EDT -----  Regarding: Dr Maradiaga/rx refill  Medication Refill    Caller (if not patient):      Relationship of caller (if not patient):      Best contact number(s):[phone]      Name of medication and dosage if known: Adderall  90 day supply 3x a day       Is patient out of this medication (yes/no):yes  not taken any for the last 3 weeks      Pharmacy name:[facility] listed in chart? (yes/no):yes    Pharmacy phone number:  [phone]  Fax:  [phone]       Details to clarify the request:    Pt said she is going to get her labs drawn today and results will be faxed over,  she also is going to have a virtual visit with Dr Francois Barrios tomorrow for her sinus infections since Dr Benedicto Mayes did not have anything until Friday, she has been out of work since last Friday and needed to make an appt as soon as possible     Jimi Colvin

## 2021-08-03 ENCOUNTER — TELEPHONE (OUTPATIENT)
Dept: INTERNAL MEDICINE CLINIC | Age: 66
End: 2021-08-03

## 2021-08-03 ENCOUNTER — VIRTUAL VISIT (OUTPATIENT)
Dept: INTERNAL MEDICINE CLINIC | Age: 66
End: 2021-08-03
Payer: COMMERCIAL

## 2021-08-03 DIAGNOSIS — J01.00 ACUTE NON-RECURRENT MAXILLARY SINUSITIS: Primary | ICD-10-CM

## 2021-08-03 PROCEDURE — 99213 OFFICE O/P EST LOW 20 MIN: CPT | Performed by: INTERNAL MEDICINE

## 2021-08-03 RX ORDER — CEFDINIR 300 MG/1
300 CAPSULE ORAL 2 TIMES DAILY
Qty: 20 CAPSULE | Refills: 0 | Status: SHIPPED | OUTPATIENT
Start: 2021-08-03 | End: 2021-08-13

## 2021-08-03 NOTE — TELEPHONE ENCOUNTER
Pt seen for virtual visit for sinus infection. Had questions about her [facility] paperwork being updated. She is requesting PCP update through August 31, 2021. She has not re-sent paperwork, and states PCP just completes with updated dates and faxes back to [address] [place] [address].

## 2021-08-03 NOTE — LETTER
NOTIFICATION RETURN TO WORK / SCHOOL    8/3/2021 5:46 PM    Ms[phone]      To Whom It May Concern:    Kelli Hill is currently under the care of [facility]. She will return to work/school on: Wednesday, August 4, 2021. If there are questions or concerns please have the patient contact our office.       Sincerely,    Chuck Skinner MD

## 2021-08-03 NOTE — PROGRESS NOTES
Virtual Visit    Patient received 1st covid vaccine (Roxanna Brown) today. Chief Complaint   Patient presents with    Sinus Infection     Patient reports severe headache, coughing, and sinus pressure, started on Friday. Denies fever. 1. Have you been to the ER, urgent care clinic since your last visit? Hospitalized since your last visit? No    2. Have you seen or consulted any other health care providers outside of the [address] [place] [address] since your last visit? Include any pap smears or colon screening. No    Health Maintenance Due   Topic Date Due    Hepatitis C Screening  Never done    COVID-19 Vaccine (1) Never done    DTaP/Tdap/Td series (1 - Tdap) Never done    Shingrix Vaccine Age 50> (1 of 2) Never done    Colorectal Cancer Screening Combo  03/19/2016    Eye Exam Retinal or Dilated  03/19/2017    Bone Densitometry (Dexa) Screening  Never done    Pneumococcal 65+ years (1 of 1 - PPSV23) Never done    Breast Cancer Screen Mammogram  02/03/2019    MICROALBUMIN Q1  01/28/2020    Foot Exam Q1  05/22/2020    A1C test (Diabetic or Prediabetic)  06/24/2021    Lipid Screen  06/24/2021       Abuse Screening Questionnaire 8/3/2021   Do you ever feel afraid of your partner? N   Are you in a relationship with someone who physically or mentally threatens you? N   Is it safe for you to go home?  Y       3 most recent PHQ Screens 8/3/2021   PHQ Not Done Active Diagnosis of Depression or Bipolar Disorder   Little interest or pleasure in doing things Nearly every day   Feeling down, depressed, irritable, or hopeless Nearly every day   Total Score PHQ 2 6   Trouble falling or staying asleep, or sleeping too much -   Feeling tired or having little energy -   Poor appetite, weight loss, or overeating -   Feeling bad about yourself - or that you are a failure or have let yourself or your family down -   Trouble concentrating on things such as school, work, reading, or watching TV -   Moving or speaking so slowly that other people could have noticed; or the opposite being so fidgety that others notice -   Thoughts of being better off dead, or hurting yourself in some way -   PHQ 9 Score -   How difficult have these problems made it for you to do your work, take care of your home and get along with others -       Learning Assessment 5/22/2019   PRIMARY LEARNER Patient   BARRIERS PRIMARY LEARNER NONE   PRIMARY LANGUAGE ENGLISH   LEARNER PREFERENCE PRIMARY READING     -     -   ANSWERED BY patient   RELATIONSHIP SELF

## 2021-08-03 NOTE — PROGRESS NOTES
Artemio Colby (: 1955) is a 77 y.o. female, established patient, here for evaluation of the following chief complaint(s)--see below:    Artemio Colby is a 77 y.o. female who was seen by synchronous (real-time) audio-video technology on 8/3/2021. Consent: Artemio Colby, who was seen by synchronous (real-time) audio-video technology, and/or her healthcare decision maker, is aware that this patient-initiated, Telehealth encounter on 8/3/2021 is a billable service, with coverage as determined by her insurance carrier. She is aware that she may receive a bill and has provided verbal consent to proceed: Yes. I was in the office while conducting this encounter. Assessment & Plan:   Diagnoses and all orders for this visit:      ICD-10-CM ICD-9-CM    1. Acute non-recurrent maxillary sinusitis  J01.00 461.0 cefdinir (OMNICEF) 300 mg capsule       Medication(s), management and follow-up based on response reviewed at visit. Reviewed typical course of illness, duration of symptoms, and exam findings. Follow-up and Dispositions    · Return if symptoms worsen or fail to improve.       lab results and schedule of future lab studies reviewed with patient  reviewed medications and side effects in detail    Plan and evaluation (above) reviewed with pt at visit  Patient voiced understanding of plan and provided with time to ask/review questions. After Visit Summary (AVS) provided to pt after visit with additional instructions as needed/reviewed. AVS:  [x]  [facility] signed. []  Mailed to patient after visit. []  Not sent to patient after visit. No future appointments. --Updated future visits after patient check-out. Subjective:   Artemio Colby was seen for:  Chief Complaint   Patient presents with    Sinus Infection     Patient reports severe headache, coughing, and sinus pressure, started on Friday. Denies fever.           Notes:  Had HA and nausea on Friday 7/30. She is having sinus/facial swelling. She is having ear pain and cough with productive sputum. She doesn't think has COVID symptoms--no fever and cough related to PNDrainage. She is having some wheezing with the cough. Nursing screenings reviewed by provider at visit. Allergies   Allergen Reactions    Codeine Other (comments)     Severe headaches    Erythromycin Rash    Morphine Hives     No longer an allergy per patient     Neomycin Rash       Prior to Admission medications    Medication Sig Start Date End Date Taking? Authorizing Provider   montelukast (SINGULAIR) 10 mg tablet Take 1 tablet by mouth daily 6/9/21  Yes Niyah Bhardwaj MD   lisinopriL (PRINIVIL, ZESTRIL) 10 mg tablet Take 1 Tab by mouth daily. 3/30/21  Yes Niyah Bhardwaj MD   escitalopram oxalate (LEXAPRO) 20 mg tablet Take 2 tablets by mouth once daily 3/16/21  Yes Niyah Bhardwaj MD   dextroamphetamine-amphetamine (ADDERALL) 10 mg tablet Take 1 Tab by mouth three (3) times daily. Max Daily Amount: 30 mg. Indications: attention deficit disorder with hyperactivity 2/3/21  Yes Fernando Moscoso NP   dextroamphetamine-amphetamine (ADDERALL) 10 mg tablet Take 10mg as directed. May take up to 3 tablets by mouth in 24hr period. 3/31/21  Yes Niyah Bhardwaj MD   dextroamphetamine-amphetamine (ADDERALL) 10 mg tablet Take 1 Tab by mouth three (3) times daily as needed (ADHD). Max Daily Amount: 30 mg. 3/1/21  Yes Niyah Bhardwaj MD   albuterol (ProAir HFA) 90 mcg/actuation inhaler Take 2 Puffs by inhalation every four (4) hours as needed for Wheezing. 12/2/20  Yes Niyah Bhardwaj MD   fluticasone propion-salmeteroL (ADVAIR/WIXELA) 250-50 mcg/dose diskus inhaler Take 1 Puff by inhalation two (2) times a day. 12/2/20  Yes Niyah Bhardwaj MD   hydroCHLOROthiazide (HYDRODIURIL) 25 mg tablet Take 1 Tab by mouth daily.  12/2/20  Yes Niyah Bhardwaj MD   dextroamphetamine-amphetamine (ADDERALL) 10 mg tablet Take 1 Tab by mouth three (3) times daily as needed (ADHD). Max Daily Amount: 30 mg. 1/31/21  Yes Jeronimo Alejandre MD   dextroamphetamine-amphetamine (ADDERALL) 10 mg tablet Take 1 Tab by mouth three (3) times daily as needed (ADHD). Max Daily Amount: 30 mg. Indications: attention deficit disorder with hyperactivity 1/1/21  Yes Jeronimo Alejandre MD   albuterol (PROVENTIL VENTOLIN) 2.5 mg /3 mL (0.083 %) nebu 3 mL by Nebulization route every four (4) hours as needed for Cough (wheezing, SOB). 8/3/20  Yes Jeronimo Alejandre MD   metFORMIN ER (GLUCOPHAGE XR) 500 mg tablet Take 2 Tabs by mouth daily (with dinner). 6/25/20  Yes Jeronimo Alejandre MD   guaiFENesin (MUCINEX) 1,200 mg Ta12 ER tablet Take 1,200 mg by mouth two (2) times a day. Yes Provider, Historical   beclomethasone (QVAR) 80 mcg/actuation aero Take 1 Puff by inhalation two (2) times a day. 11/22/19  Yes Haylee Jain MD   cetirizine (ZYRTEC) 10 mg tablet Take 1 Tab by mouth daily. 11/22/19  Yes Haylee Jain MD   predniSONE (DELTASONE) 10 mg tablet Taper daily. 60mg x1, 50mg x 1, 40mg x 1, 30mg x 1, 20mg x 1, 10mg x 1  Patient not taking: Reported on 8/3/2021 3/30/21   Jeronimo Alejandre MD   butalbital-acetaminophen-caffeine (FIORICET, ESGIC) -40 mg per tablet Take 1 Tab by mouth every six (6) hours as needed for Headache or Migraine. Patient not taking: Reported on 8/3/2021 12/2/20   Jeronimo Alejandre MD   azelastine (OPTIVAR) 0.05 % ophthalmic solution Administer 1 Drop to both eyes two (2) times a day. Use in affected eye(s)  Patient not taking: Reported on 8/3/2021 12/2/20   Jeronimo Alejandre MD   evolocumab (Repatha SureClick) pen injection 1 mL by SubCUTAneous route every fourteen (14) days. Patient not taking: Reported on 12/2/2020 8/26/20   Jeronimo Alejandre MD   DM/acetaminophen/doxylamine (VICKS NYQUIL NIGHTTIME RELIEF PO) Take  by mouth.   Patient not taking: Reported on 8/3/2021    Provider, Historical   ezetimibe (ZETIA) 10 mg tablet Take 1 Tab by mouth daily. Patient not taking: Reported on 8/3/2021 11/22/19   Quinton Sommers MD   azelastine (ASTELIN) 137 mcg (0.1 %) nasal spray 2 Sprays by Both Nostrils route two (2) times daily as needed for Rhinitis (allergies). Patient not taking: Reported on 8/3/2021 11/22/19   Quinton Sommers MD   prasugrel (EFFIENT) 10 mg tablet take 1 tablet by mouth once daily  Patient not taking: Reported on 8/3/2021 4/24/19   Provider, Historical   atorvastatin (LIPITOR) 40 mg tablet Take 1 Tab by mouth daily. Patient not taking: Reported on 12/2/2020 1/28/19   BRANDON Charles    PHYSICAL EXAMINATION:    Vital Signs: There were no vitals taken for this visit. Patient-Reported Vitals 12/29/2020   Patient-Reported Temperature 97. oral        Constitutional: [x] Appears well-developed and well-nourished [x] No apparent distress      Mental status: [x] Alert and awake  [x] Oriented [x] Able to follow commands       Eyes:   EOM    [x]  Normal      Sclera  [x]  Normal              Discharge [x]  None visible       HENT: [x] Normocephalic, atraumatic    [x] Mouth/Throat: Mucous membranes are moist    External Ears [x] Normal      Neck: [x] No visualized mass     Pulmonary/Chest: [x] Respiratory effort normal   [x] No visualized signs of difficulty breathing or respiratory distress    Musculoskeletal:  [x] Normal range of motion of neck    Neurological:        [x] No Facial Asymmetry (Cranial nerve 7 motor function) (limited exam due to video visit)          [x] No gaze palsy     Skin:        [x] No significant exanthematous lesions or discoloration noted on facial skin             Psychiatric:       [x] Normal Affect       Other pertinent observable physical exam findings:  None. We discussed the expected course, resolution and complications of the diagnosis(es) in detail.   Medication risks, benefits, costs, interactions, and alternatives were discussed as indicated. I advised her to contact the office if her condition worsens, changes or fails to improve as anticipated. She expressed understanding with the diagnosis(es) and plan. Nancy Hurley is a 77 y.o. female who was evaluated by a video visit encounter for concerns as above. Nancy Hurley is being evaluated by a Virtual Visit (video visit) encounter to address concerns as mentioned above. A caregiver was present when appropriate. Due to this being a TeleHealth encounter (During [facility]- public health emergency), evaluation of the following organ systems was limited: Vitals/Constitutional/EENT/Resp/CV/GI//MS/Neuro/Skin/Heme-Lymph-Imm. Pursuant to the emergency declaration under the [address] [place] [address] authority and the [employer] Act, this Virtual Visit was conducted with patient's (and/or legal guardian's) consent, to reduce the patient's risk of exposure to COVID-19 and provide necessary medical care. The patient (and/or legal guardian) has also been advised to contact this office for worsening conditions or problems, and seek emergency medical treatment and/or call 911 if deemed necessary. Patient identification was verified at the start of the visit: YES. Services were provided through a video synchronous discussion virtually to substitute for in-person clinic visit. Patient was located at their individual home (or other location as per patient preference). Provider was located in medical office. An electronic signature was used to authenticate this note.   -- Stephani Sellers MD

## 2021-08-04 NOTE — TELEPHONE ENCOUNTER
Please obtain the desired paperwork to be filled out for an update. If we don't receive paperwork, it can't be filled out. Either pt needs to request it from her HR dept, or we can. Clarify with pt.

## 2021-08-05 RX ORDER — DEXTROAMPHETAMINE SACCHARATE, AMPHETAMINE ASPARTATE, DEXTROAMPHETAMINE SULFATE AND AMPHETAMINE SULFATE 2.5; 2.5; 2.5; 2.5 MG/1; MG/1; MG/1; MG/1
10 TABLET ORAL
Qty: 90 TABLET | Refills: 0 | OUTPATIENT
Start: 2021-08-05

## 2021-08-05 RX ORDER — DEXTROAMPHETAMINE SACCHARATE, AMPHETAMINE ASPARTATE, DEXTROAMPHETAMINE SULFATE AND AMPHETAMINE SULFATE 2.5; 2.5; 2.5; 2.5 MG/1; MG/1; MG/1; MG/1
10 TABLET ORAL
Qty: 90 TABLET | Refills: 0 | Status: SHIPPED | OUTPATIENT
Start: 2021-08-05 | End: 2021-12-23 | Stop reason: SDUPTHER

## 2021-08-06 NOTE — TELEPHONE ENCOUNTER
Dates on FMLA form updated 8/5/21; faxed 8/5/21 to [facility], fax# [phone], updated form scanned into cc

## 2021-08-12 ENCOUNTER — VIRTUAL VISIT (OUTPATIENT)
Dept: INTERNAL MEDICINE CLINIC | Age: 66
End: 2021-08-12
Payer: COMMERCIAL

## 2021-08-12 DIAGNOSIS — J02.9 SORE THROAT: ICD-10-CM

## 2021-08-12 DIAGNOSIS — J01.00 ACUTE NON-RECURRENT MAXILLARY SINUSITIS: Primary | ICD-10-CM

## 2021-08-12 PROCEDURE — 99213 OFFICE O/P EST LOW 20 MIN: CPT | Performed by: NURSE PRACTITIONER

## 2021-08-12 NOTE — LETTER
NOTIFICATION RETURN TO WORK / SCHOOL    8/12/2021 4:15 PM    [phone]      To Whom It May Concern:    Chavez Williamson is currently under the care of [facility]. She will return to work/school on: August 16, 2021    If there are questions or concerns please have the patient contact our office.         Sincerely,      Omi Pham NP

## 2021-08-16 ENCOUNTER — TELEPHONE (OUTPATIENT)
Dept: INTERNAL MEDICINE CLINIC | Age: 66
End: 2021-08-16

## 2021-08-21 NOTE — PROGRESS NOTES
Subjective  Ivana Tee is a 77 y.o. female presents for acute care   Ivana Tee, who was evaluated through a synchronous (real-time) audio-video encounter, and/or her healthcare decision maker, is aware that it is a billable service, with coverage as determined by her insurance carrier. She provided verbal consent to proceed: Yes, and patient identification was verified. This visit was conducted pursuant to the emergency declaration under the [address] [place] [address] and the [employer] Act. A caregiver was present when appropriate. Ability to conduct physical exam was limited. The patient was located in a state where the provider was credentialed to provide care. --Elsie Quintero NP on 8/21/2021 at 12:20 AM              HPI   She reports headache for 9 days. treated for sinus infection by Dr Francois Barrios on 8/3  Compliant with antibiotic Tx  Received also COVID vaccine on 8/3 after she was seen in office. notes pharmacist was made aware of acute illness  Developed fever and chills next day.  Call office and was advised to manage with NSAID, Acetaminophen and hydration   fever resolved,  mild ST  She feels better and  requests work release date changed to  August 16 from August 31      Past Medical History:   Diagnosis Date    ADD (attention deficit disorder)     Anxiety and depression     DDD (degenerative disc disease), lumbar     dr Ulises Orlando Diet-controlled type 2 diabetes mellitus ([facility] [address]     Epicondylitis, lateral     Familial hyperlipidemia     Fibromyalgia     Hx of diabetes mellitus     Hyperlipidemia LDL goal < 100     Hypertension     OA (osteoarthritis)     Rotator cuff rupture     Tobacco abuse        Allergies   Allergen Reactions    Codeine Other (comments)     Severe headaches    Erythromycin Rash    Morphine Hives     No longer an allergy per patient     Neomycin Rash Current Outpatient Medications   Medication Sig    dextroamphetamine-amphetamine (ADDERALL) 10 mg tablet Take 1 Tablet by mouth three (3) times daily as needed (ADD). Max Daily Amount: 30 mg. Indications: attention deficit disorder with hyperactivity    montelukast (SINGULAIR) 10 mg tablet Take 1 tablet by mouth daily    predniSONE (DELTASONE) 10 mg tablet Taper daily. 60mg x1, 50mg x 1, 40mg x 1, 30mg x 1, 20mg x 1, 10mg x 1 (Patient not taking: Reported on 8/3/2021)    lisinopriL (PRINIVIL, ZESTRIL) 10 mg tablet Take 1 Tab by mouth daily.  escitalopram oxalate (LEXAPRO) 20 mg tablet Take 2 tablets by mouth once daily    dextroamphetamine-amphetamine (ADDERALL) 10 mg tablet Take 10mg as directed. May take up to 3 tablets by mouth in 24hr period.  dextroamphetamine-amphetamine (ADDERALL) 10 mg tablet Take 1 Tab by mouth three (3) times daily as needed (ADHD). Max Daily Amount: 30 mg.    albuterol (ProAir HFA) 90 mcg/actuation inhaler Take 2 Puffs by inhalation every four (4) hours as needed for Wheezing.  fluticasone propion-salmeteroL (ADVAIR/WIXELA) 250-50 mcg/dose diskus inhaler Take 1 Puff by inhalation two (2) times a day.  hydroCHLOROthiazide (HYDRODIURIL) 25 mg tablet Take 1 Tab by mouth daily.  butalbital-acetaminophen-caffeine (FIORICET, ESGIC) -40 mg per tablet Take 1 Tab by mouth every six (6) hours as needed for Headache or Migraine. (Patient not taking: Reported on 8/3/2021)    dextroamphetamine-amphetamine (ADDERALL) 10 mg tablet Take 1 Tab by mouth three (3) times daily as needed (ADHD). Max Daily Amount: 30 mg.    dextroamphetamine-amphetamine (ADDERALL) 10 mg tablet Take 1 Tab by mouth three (3) times daily as needed (ADHD). Max Daily Amount: 30 mg. Indications: attention deficit disorder with hyperactivity    azelastine (OPTIVAR) 0.05 % ophthalmic solution Administer 1 Drop to both eyes two (2) times a day.  Use in affected eye(s) (Patient not taking: Reported on 8/3/2021)    evolocumab (Repatha SureClick) pen injection 1 mL by SubCUTAneous route every fourteen (14) days. (Patient not taking: Reported on 2020)    albuterol (PROVENTIL VENTOLIN) 2.5 mg /3 mL (0.083 %) nebu 3 mL by Nebulization route every four (4) hours as needed for Cough (wheezing, SOB).  metFORMIN ER (GLUCOPHAGE XR) 500 mg tablet Take 2 Tabs by mouth daily (with dinner).  DM/acetaminophen/doxylamine (VICKS NYQUIL NIGHTTIME RELIEF PO) Take  by mouth. (Patient not taking: Reported on 8/3/2021)    guaiFENesin (MUCINEX) 1,200 mg Ta12 ER tablet Take 1,200 mg by mouth two (2) times a day.  ezetimibe (ZETIA) 10 mg tablet Take 1 Tab by mouth daily. (Patient not taking: Reported on 8/3/2021)    beclomethasone (QVAR) 80 mcg/actuation aero Take 1 Puff by inhalation two (2) times a day.  cetirizine (ZYRTEC) 10 mg tablet Take 1 Tab by mouth daily.  azelastine (ASTELIN) 137 mcg (0.1 %) nasal spray 2 Sprays by Both Nostrils route two (2) times daily as needed for Rhinitis (allergies). (Patient not taking: Reported on 8/3/2021)    prasugrel (EFFIENT) 10 mg tablet take 1 tablet by mouth once daily (Patient not taking: Reported on 8/3/2021)    atorvastatin (LIPITOR) 40 mg tablet Take 1 Tab by mouth daily. (Patient not taking: Reported on 2020)     No current facility-administered medications for this visit. Past Surgical History:   Procedure Laterality Date    HX  SECTION      HX MOHS PROCEDURES  8/15    [place] [facility]    HX PARTIAL HYSTERECTOMY      HX SHOULDER REPLACEMENT Left     OK CARDIAC SURG PROCEDURE UNLIST             Review of Systems   Constitutional: Positive for malaise/fatigue. Negative for chills and fever. HENT: Positive for sore throat. Eyes: Negative. Cardiovascular: Negative. Gastrointestinal: Negative. Musculoskeletal: Negative for myalgias. Neurological: Negative for dizziness and headaches.        Objective  Physical Exam  Constitutional:       General: She is not in acute distress. Appearance: Normal appearance. She is not ill-appearing. Neurological:      Mental Status: She is alert. Psychiatric:         Mood and Affect: Mood normal.         Thought Content: Thought content normal.          Assessment & Plan    ICD-10-CM ICD-9-CM    1. Acute non-recurrent maxillary sinusitis  J01.00 461.0    2.  Sore throat  J02.9 462          current treatment plan is effective, no change in therapy  reviewed medications and side effects in detail  Jennifer Garza NP

## 2021-09-23 ENCOUNTER — PATIENT MESSAGE (OUTPATIENT)
Dept: INTERNAL MEDICINE CLINIC | Age: 66
End: 2021-09-23

## 2021-11-30 ENCOUNTER — PATIENT MESSAGE (OUTPATIENT)
Dept: INTERNAL MEDICINE CLINIC | Age: 66
End: 2021-11-30

## 2021-12-23 ENCOUNTER — OFFICE VISIT (OUTPATIENT)
Dept: INTERNAL MEDICINE CLINIC | Age: 66
End: 2021-12-23
Payer: COMMERCIAL

## 2021-12-23 VITALS
WEIGHT: 157.2 LBS | HEART RATE: 118 BPM | DIASTOLIC BLOOD PRESSURE: 91 MMHG | BODY MASS INDEX: 28.93 KG/M2 | HEIGHT: 62 IN | OXYGEN SATURATION: 97 % | SYSTOLIC BLOOD PRESSURE: 152 MMHG | TEMPERATURE: 99.5 F

## 2021-12-23 DIAGNOSIS — B37.9 YEAST INFECTION: ICD-10-CM

## 2021-12-23 DIAGNOSIS — M81.0 OSTEOPOROSIS, UNSPECIFIED OSTEOPOROSIS TYPE, UNSPECIFIED PATHOLOGICAL FRACTURE PRESENCE: ICD-10-CM

## 2021-12-23 DIAGNOSIS — E78.2 MIXED HYPERLIPIDEMIA: ICD-10-CM

## 2021-12-23 DIAGNOSIS — M81.0 AGE-RELATED OSTEOPOROSIS WITHOUT CURRENT PATHOLOGICAL FRACTURE: ICD-10-CM

## 2021-12-23 DIAGNOSIS — Z12.11 COLON CANCER SCREENING: ICD-10-CM

## 2021-12-23 DIAGNOSIS — E11.9 TYPE 2 DIABETES MELLITUS WITHOUT COMPLICATION, WITHOUT LONG-TERM CURRENT USE OF INSULIN (HCC): ICD-10-CM

## 2021-12-23 DIAGNOSIS — R00.0 TACHYCARDIA: ICD-10-CM

## 2021-12-23 DIAGNOSIS — N39.41 URGE INCONTINENCE OF URINE: Primary | ICD-10-CM

## 2021-12-23 DIAGNOSIS — I10 ESSENTIAL HYPERTENSION: ICD-10-CM

## 2021-12-23 DIAGNOSIS — Z12.31 SCREENING MAMMOGRAM FOR BREAST CANCER: ICD-10-CM

## 2021-12-23 DIAGNOSIS — E55.9 VITAMIN D DEFICIENCY: ICD-10-CM

## 2021-12-23 DIAGNOSIS — R32 URINARY INCONTINENCE, UNSPECIFIED TYPE: ICD-10-CM

## 2021-12-23 DIAGNOSIS — Z11.59 NEED FOR HEPATITIS C SCREENING TEST: ICD-10-CM

## 2021-12-23 DIAGNOSIS — J45.40 MODERATE PERSISTENT ASTHMA WITHOUT COMPLICATION: ICD-10-CM

## 2021-12-23 DIAGNOSIS — F98.8 ATTENTION DEFICIT DISORDER (ADD) WITHOUT HYPERACTIVITY: ICD-10-CM

## 2021-12-23 LAB
BILIRUB UR QL STRIP: ABNORMAL
GLUCOSE UR-MCNC: ABNORMAL MG/DL
KETONES P FAST UR STRIP-MCNC: ABNORMAL MG/DL
PH UR STRIP: 5 [PH] (ref 4.6–8)
PROT UR QL STRIP: ABNORMAL
SP GR UR STRIP: 1.02 (ref 1–1.03)
UA UROBILINOGEN AMB POC: ABNORMAL (ref 0.2–1)
URINALYSIS CLARITY POC: CLEAR
URINALYSIS COLOR POC: YELLOW
URINE BLOOD POC: NEGATIVE
URINE LEUKOCYTES POC: NEGATIVE
URINE NITRITES POC: NEGATIVE

## 2021-12-23 PROCEDURE — 99215 OFFICE O/P EST HI 40 MIN: CPT | Performed by: INTERNAL MEDICINE

## 2021-12-23 PROCEDURE — 81001 URINALYSIS AUTO W/SCOPE: CPT | Performed by: INTERNAL MEDICINE

## 2021-12-23 PROCEDURE — 93000 ELECTROCARDIOGRAM COMPLETE: CPT | Performed by: INTERNAL MEDICINE

## 2021-12-23 RX ORDER — MONTELUKAST SODIUM 10 MG/1
10 TABLET ORAL DAILY
Qty: 90 TABLET | Refills: 1 | Status: SHIPPED | OUTPATIENT
Start: 2021-12-23

## 2021-12-23 RX ORDER — CEFDINIR 300 MG/1
300 CAPSULE ORAL 2 TIMES DAILY
Qty: 20 CAPSULE | Refills: 0 | Status: SHIPPED | OUTPATIENT
Start: 2021-12-23 | End: 2022-01-02

## 2021-12-23 RX ORDER — DEXTROAMPHETAMINE SACCHARATE, AMPHETAMINE ASPARTATE, DEXTROAMPHETAMINE SULFATE AND AMPHETAMINE SULFATE 2.5; 2.5; 2.5; 2.5 MG/1; MG/1; MG/1; MG/1
10 TABLET ORAL
Qty: 90 TABLET | Refills: 0 | Status: SHIPPED | OUTPATIENT
Start: 2022-01-17

## 2021-12-23 RX ORDER — ALBUTEROL SULFATE 0.83 MG/ML
2.5 SOLUTION RESPIRATORY (INHALATION)
Qty: 30 NEBULE | Refills: 2 | Status: SHIPPED | OUTPATIENT
Start: 2021-12-23

## 2021-12-23 RX ORDER — DEXTROAMPHETAMINE SACCHARATE, AMPHETAMINE ASPARTATE, DEXTROAMPHETAMINE SULFATE AND AMPHETAMINE SULFATE 2.5; 2.5; 2.5; 2.5 MG/1; MG/1; MG/1; MG/1
10 TABLET ORAL
Qty: 90 TABLET | Refills: 0 | Status: CANCELLED | OUTPATIENT
Start: 2021-12-23

## 2021-12-23 RX ORDER — FLUTICASONE PROPIONATE AND SALMETEROL 250; 50 UG/1; UG/1
1 POWDER RESPIRATORY (INHALATION) 2 TIMES DAILY
Qty: 1 EACH | Refills: 5 | Status: SHIPPED | OUTPATIENT
Start: 2021-12-23

## 2021-12-23 RX ORDER — LOSARTAN POTASSIUM 25 MG/1
25 TABLET ORAL DAILY
Qty: 30 TABLET | Refills: 5 | Status: SHIPPED | OUTPATIENT
Start: 2021-12-23

## 2021-12-23 RX ORDER — DEXTROAMPHETAMINE SACCHARATE, AMPHETAMINE ASPARTATE, DEXTROAMPHETAMINE SULFATE AND AMPHETAMINE SULFATE 2.5; 2.5; 2.5; 2.5 MG/1; MG/1; MG/1; MG/1
10 TABLET ORAL
Qty: 90 TABLET | Refills: 0 | Status: SHIPPED | OUTPATIENT
Start: 2022-03-17

## 2021-12-23 RX ORDER — DEXTROAMPHETAMINE SACCHARATE, AMPHETAMINE ASPARTATE, DEXTROAMPHETAMINE SULFATE AND AMPHETAMINE SULFATE 2.5; 2.5; 2.5; 2.5 MG/1; MG/1; MG/1; MG/1
10 TABLET ORAL
Qty: 90 TABLET | Refills: 0 | Status: SHIPPED | OUTPATIENT
Start: 2022-02-16

## 2021-12-23 RX ORDER — FLUCONAZOLE 150 MG/1
150 TABLET ORAL DAILY
Qty: 2 TABLET | Refills: 0 | Status: SHIPPED | OUTPATIENT
Start: 2021-12-23 | End: 2021-12-24

## 2021-12-23 NOTE — PROGRESS NOTES
RM 14  Pt is fasting    Chief Complaint   Patient presents with    Incontinence     worse in the last week.  Vaginal Itching       Visit Vitals  BP (!) 152/91   Pulse (!) 118   Temp 99.5 °F (37.5 °C)   Ht 5' 2\" (1.575 m)   Wt 157 lb 3.2 oz (71.3 kg)   SpO2 97%   BMI 28.75 kg/m²       3 most recent PHQ Screens 8/3/2021   PHQ Not Done Active Diagnosis of Depression or Bipolar Disorder   Little interest or pleasure in doing things Nearly every day   Feeling down, depressed, irritable, or hopeless Nearly every day   Total Score PHQ 2 6   Trouble falling or staying asleep, or sleeping too much -   Feeling tired or having little energy -   Poor appetite, weight loss, or overeating -   Feeling bad about yourself - or that you are a failure or have let yourself or your family down -   Trouble concentrating on things such as school, work, reading, or watching TV -   Moving or speaking so slowly that other people could have noticed; or the opposite being so fidgety that others notice -   Thoughts of being better off dead, or hurting yourself in some way -   PHQ 9 Score -   How difficult have these problems made it for you to do your work, take care of your home and get along with others -         1. Have you been to the ER, urgent care clinic since your last visit? Hospitalized since your last visit? No    2. Have you seen or consulted any other health care providers outside of the [address] [place] [address] since your last visit? Include any pap smears or colon screening.  No    Health Maintenance Due   Topic Date Due    Hepatitis C Screening  Never done    COVID-19 Vaccine (1) Never done    DTaP/Tdap/Td series (1 - Tdap) Never done    Shingrix Vaccine Age 50> (1 of 2) Never done    Colorectal Cancer Screening Combo  03/19/2016    Eye Exam Retinal or Dilated  03/19/2017    Bone Densitometry (Dexa) Screening  Never done    Pneumococcal 65+ years (1 of 1 - PPSV23) Never done    Breast Cancer Screen Mammogram 02/03/2019    MICROALBUMIN Q1  01/28/2020    Foot Exam Q1  05/22/2020    A1C test (Diabetic or Prediabetic)  06/24/2021    Lipid Screen  06/24/2021    Flu Vaccine (1) Never done       Learning Assessment 5/22/2019   PRIMARY LEARNER Patient   BARRIERS PRIMARY LEARNER NONE   PRIMARY LANGUAGE ENGLISH   LEARNER PREFERENCE PRIMARY READING     -     -   ANSWERED BY patient   RELATIONSHIP SELF       AVS  education, follow up, and recommendations provided and addressed with patient.   services used to advise patient -no

## 2021-12-23 NOTE — PROGRESS NOTES
HPI:  established patient  Presents for f/u multiple medical problems    More chronic urge incontinence  +itchiness in genital area  +/- dysuria  More recently had incontinence - did not feel it  More tired, poor energy    Feels like doesn't empty bladder    adderall 2-3 times per day    Stopped lisinopril - pt cites leg pain       Past medical, Social, and Family history reviewed    Prior to Admission medications    Medication Sig Start Date End Date Taking? Authorizing Provider   dextroamphetamine-amphetamine (ADDERALL) 10 mg tablet Take 1 Tablet by mouth three (3) times daily as needed (ADHD). Max Daily Amount: 30 mg. Indications: attention deficit disorder with hyperactivity 12/17/21  Yes Josy Hurtado MD   dextroamphetamine-amphetamine (ADDERALL) 10 mg tablet Take 1 Tablet by mouth three (3) times daily as needed (ADD). Max Daily Amount: 30 mg. Indications: attention deficit disorder with hyperactivity 8/5/21  Yes Josy Hurtado MD   montelukast (SINGULAIR) 10 mg tablet Take 1 tablet by mouth daily 6/9/21  Yes Josy Hurtado MD   escitalopram oxalate (LEXAPRO) 20 mg tablet Take 2 tablets by mouth once daily 3/16/21  Yes Josy Hurtado MD   dextroamphetamine-amphetamine (ADDERALL) 10 mg tablet Take 10mg as directed. May take up to 3 tablets by mouth in 24hr period. 3/31/21  Yes Josy Hurtado MD   dextroamphetamine-amphetamine (ADDERALL) 10 mg tablet Take 1 Tab by mouth three (3) times daily as needed (ADHD). Max Daily Amount: 30 mg. 3/1/21  Yes Josy Hurtado MD   albuterol (ProAir HFA) 90 mcg/actuation inhaler Take 2 Puffs by inhalation every four (4) hours as needed for Wheezing. 12/2/20  Yes Josy Hurtado MD   fluticasone propion-salmeteroL (ADVAIR/WIXELA) 250-50 mcg/dose diskus inhaler Take 1 Puff by inhalation two (2) times a day.  12/2/20  Yes Josy Hurtado MD   dextroamphetamine-amphetamine (ADDERALL) 10 mg tablet Take 1 Tab by mouth three (3) times daily as needed (ADHD). Max Daily Amount: 30 mg. 1/31/21  Yes Peter Hernández MD   albuterol (PROVENTIL VENTOLIN) 2.5 mg /3 mL (0.083 %) nebu 3 mL by Nebulization route every four (4) hours as needed for Cough (wheezing, SOB). 8/3/20  Yes Peter Hernández MD   DM/acetaminophen/doxylamine (VICKS NYQUIL NIGHTTIME RELIEF PO) Take  by mouth. Yes Provider, Historical   guaiFENesin (MUCINEX) 1,200 mg Ta12 ER tablet Take 1,200 mg by mouth two (2) times a day. Yes Provider, Historical   cetirizine (ZYRTEC) 10 mg tablet Take 1 Tab by mouth daily. 11/22/19  Yes Jamal Ba MD   predniSONE (DELTASONE) 10 mg tablet Taper daily. 60mg x1, 50mg x 1, 40mg x 1, 30mg x 1, 20mg x 1, 10mg x 1  Patient not taking: Reported on 8/3/2021 3/30/21   Peter Hernández MD   lisinopriL (PRINIVIL, ZESTRIL) 10 mg tablet Take 1 Tab by mouth daily. Patient not taking: Reported on 12/23/2021 3/30/21   Peter Hernández MD   hydroCHLOROthiazide (HYDRODIURIL) 25 mg tablet Take 1 Tab by mouth daily. Patient not taking: Reported on 12/23/2021 12/2/20   Peter Hernández MD   butalbital-acetaminophen-caffeine (FIORICET, ESGIC) -40 mg per tablet Take 1 Tab by mouth every six (6) hours as needed for Headache or Migraine. Patient not taking: Reported on 8/3/2021 12/2/20   Peter Hernández MD   azelastine (OPTIVAR) 0.05 % ophthalmic solution Administer 1 Drop to both eyes two (2) times a day. Use in affected eye(s)  Patient not taking: Reported on 8/3/2021 12/2/20   Peter Hernández MD   evolocumab (Repatha SureClick) pen injection 1 mL by SubCUTAneous route every fourteen (14) days. Patient not taking: Reported on 12/2/2020 8/26/20   Peter Hernández MD   metFORMIN ER (GLUCOPHAGE XR) 500 mg tablet Take 2 Tabs by mouth daily (with dinner). Patient not taking: Reported on 12/23/2021 6/25/20   Peter Hernández MD   ezetimibe (ZETIA) 10 mg tablet Take 1 Tab by mouth daily.   Patient not taking: Reported on 8/3/2021 11/22/19 Antonio Tinsley MD   beclomethasone (QVAR) 80 mcg/actuation aero Take 1 Puff by inhalation two (2) times a day. Patient not taking: Reported on 12/23/2021 11/22/19   Antonio Tinsley MD   azelastine (ASTELIN) 137 mcg (0.1 %) nasal spray 2 Sprays by Both Nostrils route two (2) times daily as needed for Rhinitis (allergies). Patient not taking: Reported on 8/3/2021 11/22/19   Antonio Tinsley MD   prasugrel (EFFIENT) 10 mg tablet take 1 tablet by mouth once daily  Patient not taking: Reported on 8/3/2021 4/24/19   Deloris Bermeo   atorvastatin (LIPITOR) 40 mg tablet Take 1 Tab by mouth daily. Patient not taking: Reported on 12/2/2020 1/28/19   Belinda Lara NP          ROS  Complete ROS reviewed and negative or stable except as noted in HPI. Physical Exam  Vitals and nursing note reviewed. Constitutional:       General: She is not in acute distress. HENT:      Head: Normocephalic and atraumatic. Mouth/Throat:      Pharynx: No oropharyngeal exudate. Eyes:      General: No scleral icterus. Pupils: Pupils are equal, round, and reactive to light. Neck:      Thyroid: No thyromegaly. Vascular: No JVD. Cardiovascular:      Rate and Rhythm: Regular rhythm. Tachycardia present. Heart sounds: Normal heart sounds. No murmur heard. No friction rub. No gallop. Pulmonary:      Effort: Pulmonary effort is normal. No respiratory distress. Breath sounds: Normal breath sounds. No wheezing or rales. Abdominal:      General: Bowel sounds are normal. There is no distension. Palpations: Abdomen is soft. Tenderness: There is no abdominal tenderness. Musculoskeletal:         General: Normal range of motion. Cervical back: Normal range of motion and neck supple. Lymphadenopathy:      Cervical: No cervical adenopathy. Skin:     General: Skin is warm. Findings: No rash.    Neurological:      Mental Status: She is alert and oriented to person, place, and time. Motor: No abnormal muscle tone. Coordination: Coordination normal.           Prior labs reviewed. POC UA - not conclusive  EKG - reviewed - sinus tachcardia  Reviewed prior imaging reports      Assessment/Plan:    ICD-10-CM ICD-9-CM    1. Urge incontinence of urine  N39.41 788.31 AMB POC URINALYSIS DIP STICK AUTO W/ MICRO      REFERRAL TO FEMALE PELVIC MEDICINE AND RECONSTRUCTIVE SURGERY      CULTURE, URINE      URINALYSIS W/ RFLX MICROSCOPIC      cefdinir (OMNICEF) 300 mg capsule      URINALYSIS W/ RFLX MICROSCOPIC      CULTURE, URINE   2. Attention deficit disorder (ADD) without hyperactivity  F98.8 314.00 dextroamphetamine-amphetamine (ADDERALL) 10 mg tablet      dextroamphetamine-amphetamine (ADDERALL) 10 mg tablet      dextroamphetamine-amphetamine (ADDERALL) 10 mg tablet   3. Moderate persistent asthma without complication  S25.66 853.59 albuterol (PROVENTIL VENTOLIN) 2.5 mg /3 mL (0.083 %) nebu      fluticasone propion-salmeteroL (ADVAIR/WIXELA) 250-50 mcg/dose diskus inhaler      montelukast (SINGULAIR) 10 mg tablet   4. Type 2 diabetes mellitus without complication, without long-term current use of insulin (HCC)  E11.9 250.00 HEMOGLOBIN A1C WITH EAG      HEMOGLOBIN A1C WITH EAG   5. Age-related osteoporosis without current pathological fracture  M81.0 733.01    6. Vitamin D deficiency  E55.9 268.9 VITAMIN D, 25 HYDROXY      VITAMIN D, 25 HYDROXY   7. Mixed hyperlipidemia  E78.2 364.2 CK      METABOLIC PANEL, COMPREHENSIVE      LIPID PANEL      LIPID PANEL      METABOLIC PANEL, COMPREHENSIVE      CK   8. Essential hypertension  I10 401.9 CBC WITH AUTOMATED DIFF      losartan (COZAAR) 25 mg tablet      CBC WITH AUTOMATED DIFF   9. Tachycardia  R00.0 785.0 AMB POC EKG ROUTINE W/ 12 LEADS, INTER & REP   10. Urinary incontinence, unspecified type  R32 788.30    11. Need for hepatitis C screening test  Z11.59 V73.89 HCV AB W/RFLX TO CHRISSIE      HCV AB W/RFLX TO CHRISSIE   12.  Yeast infection B37.9 112.9 fluconazole (DIFLUCAN) 150 mg tablet   13. Colon cancer screening  Z12.11 V76.51 COLOGUARD TEST (FECAL DNA COLORECTAL CANCER SCREENING)   14. Screening mammogram for breast cancer  Z12.31 V76.12 JOHNNY 3D YASIR W MAMMO BI SCREENING INCL CAD   13. Osteoporosis, unspecified osteoporosis type, unspecified pathological fracture presence  M81.0 733.00 DEXA BONE DENSITY STUDY AXIAL     Follow-up and Dispositions    · Return in about 3 months (around 3/23/2022), or if symptoms worsen or fail to improve, for diabetes, blood pressure. results and schedule of future studies reviewed with patient  reviewed diet, exercise and weight    cardiovascular risk and specific lipid/LDL goals reviewed  reviewed medications and side effects in detail    Ref urogyn  Diflucan  Empiric abx for UTI  Send cx and UA + micro  Losartan instead of lisinopril  cologuard   Mammogram   DEXA  Defers flu shot and pneumovax 23  Defers shingles vaccine      An electronic signature was used to authenticate this note.   -- Fern Griffin MD

## 2021-12-23 NOTE — LETTER
NOTIFICATION RETURN TO WORK / SCHOOL    12/23/2021 10:33 AM    [phone]      To Whom It May Concern:    Desi Calderon is currently under the care of [facility]. She will return to work/school on: 12/27/21    If there are questions or concerns please have the patient contact our office.         Sincerely,      Rachael Lesches, MD

## 2021-12-24 LAB
25(OH)D3+25(OH)D2 SERPL-MCNC: 28.8 NG/ML (ref 30–100)
ALBUMIN SERPL-MCNC: 4.5 G/DL (ref 3.8–4.8)
ALBUMIN/GLOB SERPL: 1.9 {RATIO} (ref 1.2–2.2)
ALP SERPL-CCNC: 195 IU/L (ref 44–121)
ALT SERPL-CCNC: 17 IU/L (ref 0–32)
AST SERPL-CCNC: 12 IU/L (ref 0–40)
BASOPHILS # BLD AUTO: 0.1 X10E3/UL (ref 0–0.2)
BASOPHILS NFR BLD AUTO: 1 %
BILIRUB SERPL-MCNC: 0.3 MG/DL (ref 0–1.2)
BUN SERPL-MCNC: 14 MG/DL (ref 8–27)
BUN/CREAT SERPL: 21 (ref 12–28)
CALCIUM SERPL-MCNC: 9.9 MG/DL (ref 8.7–10.3)
CHLORIDE SERPL-SCNC: 92 MMOL/L (ref 96–106)
CHOLEST SERPL-MCNC: 334 MG/DL (ref 100–199)
CK SERPL-CCNC: 46 U/L (ref 32–182)
CO2 SERPL-SCNC: 18 MMOL/L (ref 20–29)
CREAT SERPL-MCNC: 0.67 MG/DL (ref 0.57–1)
EOSINOPHIL # BLD AUTO: 0.3 X10E3/UL (ref 0–0.4)
EOSINOPHIL NFR BLD AUTO: 2 %
ERYTHROCYTE [DISTWIDTH] IN BLOOD BY AUTOMATED COUNT: 11.4 % (ref 11.7–15.4)
EST. AVERAGE GLUCOSE BLD GHB EST-MCNC: 269 MG/DL
GLOBULIN SER CALC-MCNC: 2.4 G/DL (ref 1.5–4.5)
GLUCOSE SERPL-MCNC: 298 MG/DL (ref 65–99)
HBA1C MFR BLD: 11 % (ref 4.8–5.6)
HCT VFR BLD AUTO: 44.1 % (ref 34–46.6)
HCV AB S/CO SERPL IA: <0.1 S/CO RATIO (ref 0–0.9)
HCV AB SERPL QL IA: NORMAL
HDLC SERPL-MCNC: 35 MG/DL
HGB BLD-MCNC: 15.1 G/DL (ref 11.1–15.9)
IMM GRANULOCYTES # BLD AUTO: 0.1 X10E3/UL (ref 0–0.1)
IMM GRANULOCYTES NFR BLD AUTO: 1 %
LDLC SERPL CALC-MCNC: 243 MG/DL (ref 0–99)
LYMPHOCYTES # BLD AUTO: 2.8 X10E3/UL (ref 0.7–3.1)
LYMPHOCYTES NFR BLD AUTO: 21 %
MCH RBC QN AUTO: 29.9 PG (ref 26.6–33)
MCHC RBC AUTO-ENTMCNC: 34.2 G/DL (ref 31.5–35.7)
MCV RBC AUTO: 87 FL (ref 79–97)
MONOCYTES # BLD AUTO: 0.8 X10E3/UL (ref 0.1–0.9)
MONOCYTES NFR BLD AUTO: 6 %
NEUTROPHILS # BLD AUTO: 9.3 X10E3/UL (ref 1.4–7)
NEUTROPHILS NFR BLD AUTO: 69 %
PLATELET # BLD AUTO: 438 X10E3/UL (ref 150–450)
POTASSIUM SERPL-SCNC: 4.2 MMOL/L (ref 3.5–5.2)
PROT SERPL-MCNC: 6.9 G/DL (ref 6–8.5)
RBC # BLD AUTO: 5.05 X10E6/UL (ref 3.77–5.28)
SODIUM SERPL-SCNC: 132 MMOL/L (ref 134–144)
TRIGL SERPL-MCNC: 265 MG/DL (ref 0–149)
VLDLC SERPL CALC-MCNC: 56 MG/DL (ref 5–40)
WBC # BLD AUTO: 13.5 X10E3/UL (ref 3.4–10.8)

## 2021-12-24 NOTE — PROGRESS NOTES
Your diabetes is very poorly controlled at this point - HgbA1C at 11.0  This is the reason for your urine problem, not likely infection even though the culture is still pending. You are urinating so much because your blood sugar is so high. And your cholesterol is VERY high suggesting you are not on medication. Other labs are OK. Please take meds. Make another appt to review med use and try to get control on these things again.

## 2021-12-26 LAB
APPEARANCE UR: CLEAR
BACTERIA UR CULT: NORMAL
BILIRUB UR QL STRIP: NEGATIVE
COLOR UR: YELLOW
GLUCOSE UR QL: ABNORMAL
HGB UR QL STRIP: NEGATIVE
KETONES UR QL STRIP: ABNORMAL
LEUKOCYTE ESTERASE UR QL STRIP: NEGATIVE
MICRO URNS: ABNORMAL
NITRITE UR QL STRIP: NEGATIVE
PH UR STRIP: 5.5 [PH] (ref 5–7.5)
PROT UR QL STRIP: ABNORMAL
SP GR UR: >=1.03 (ref 1–1.03)
SPECIMEN STATUS REPORT, ROLRST: NORMAL
UROBILINOGEN UR STRIP-MCNC: 0.2 MG/DL (ref 0.2–1)

## 2021-12-27 DIAGNOSIS — J45.40 MODERATE PERSISTENT ASTHMA WITHOUT COMPLICATION: ICD-10-CM

## 2021-12-27 NOTE — PROGRESS NOTES
Called to inform pt of lab results along with providers recommendations. Unable to leave message d/t mailbox not being set up. Will try back later.

## 2021-12-27 NOTE — PROGRESS NOTES
Please notify pt of results    Her diabetes is very poorly controlled at this point - HgbA1C at 11.0  This is the reason for her urine problem, not likely infection even though the culture is still pending.  She is urinating so much because her blood sugar is so high. And her cholesterol is VERY high suggesting she is not on medication. Other labs are OK. Please take meds. Make another appt to review med use and try to get control on these things again.

## 2021-12-28 RX ORDER — ALBUTEROL SULFATE 90 UG/1
2 AEROSOL, METERED RESPIRATORY (INHALATION)
Qty: 8.5 G | Refills: 3 | Status: SHIPPED | OUTPATIENT
Start: 2021-12-28

## 2022-01-04 ENCOUNTER — TELEPHONE (OUTPATIENT)
Dept: INTERNAL MEDICINE CLINIC | Age: 67
End: 2022-01-04

## 2022-01-04 ENCOUNTER — VIRTUAL VISIT (OUTPATIENT)
Dept: INTERNAL MEDICINE CLINIC | Age: 67
End: 2022-01-04
Payer: COMMERCIAL

## 2022-01-04 DIAGNOSIS — U07.1 COVID-19 VIRUS INFECTION: Primary | ICD-10-CM

## 2022-01-04 DIAGNOSIS — E08.65 DIABETES MELLITUS DUE TO UNDERLYING CONDITION WITH HYPERGLYCEMIA, WITHOUT LONG-TERM CURRENT USE OF INSULIN (HCC): ICD-10-CM

## 2022-01-04 PROCEDURE — 99213 OFFICE O/P EST LOW 20 MIN: CPT | Performed by: INTERNAL MEDICINE

## 2022-01-04 RX ORDER — LANCING DEVICE/LANCETS
KIT MISCELLANEOUS
Qty: 1 KIT | Refills: 0 | Status: SHIPPED | OUTPATIENT
Start: 2022-01-04

## 2022-01-04 RX ORDER — INSULIN PUMP SYRINGE, 3 ML
EACH MISCELLANEOUS
Qty: 1 KIT | Refills: 0 | Status: SHIPPED | OUTPATIENT
Start: 2022-01-04

## 2022-01-04 NOTE — PROGRESS NOTES
Patient advised of lab results. She reports metformin causes her diarrhea and so she does not take it. She reports she cannot take statin medications due to severe leg pains. Patient reports her diet has been bad due to taste changes after having covid 19 1 year ago. Patient advised Zetia is not a statin medication. She reports she does not know if this medication had caused her any unwanted side effects. She will try restarting the zetia daily. I advised the patient to restart the metformin and to start with a low dose maybe 1/2 tablet or 1 tablet and as her body adjusts and gets used to taking the medication and the diarrhea gets better, slowly increase till she can tolerate the recommended 2 tablets daily. Patient advised to drink lots of water to prevent dehydration if diarrhea occurs. Patient will try this and will contact us if she is unable to tolerate the medications. She will work on her diet and try decrease sugar, carbs and saturated fats. She will aim for 30 minutes of exercise such as going for a walk daily.

## 2022-01-04 NOTE — PROGRESS NOTES
Amina Frias is a 74 y.o. female who was seen by synchronous (real-time) audio-video technology on 1/4/2022. Consent: Amina Frias, who was seen by synchronous (real-time) audio-video technology, and/or her healthcare decision maker, is aware that this patient-initiated, Telehealth encounter on 1/4/2022 is a billable service, with coverage as determined by her insurance carrier. She is aware that she may receive a bill and has provided verbal consent to proceed: Yes. Assessment & Plan:   Diagnoses and all orders for this visit:    1. COVID-19 virus infection    2. Diabetes mellitus due to underlying condition with hyperglycemia, without long-term current use of insulin ([facility])  -     Blood-Glucose Meter monitoring kit; Use to measure blood sugar on average 1 time per day. -     Lancing Device with Lancets kit; As needed for testing blood sugar  -     glucose blood VI test strips (ASCENSIA AUTODISC VI, ONE TOUCH ULTRA TEST VI) strip; For use 1 time daily to measure blood sugar      covid infection: presumed infection with known exposure at workplace and symptoms consistent with infection. Patient with uncontrolled diabetes and asthma, however now 7 days past start of infection and feeling notably improved. She would like to return to work. Fever free for > 24 hours. - lettter provided for return to work. Uncontrolled Diabetes: reports Umang Blackman knows what to do\" to get it under control without medication. Encouarged her to follow-up with her primary provider. Rx for meter sent to pharmacy. Follow-up and Dispositions    · Return for follow-up diabetes with primary provider.          (Please also see details in patient instructions)  Subjective:   Amina Frias is a 74 y.o. female who was seen for Other (letter to return to work had covid symptoms that began 7 days ago currently feeling better, [facility] testing 1 week ago came back inconclusive )    Patient with severe sore throat with headache starting 12/28/2021. Accompanied by nausea. Had a fever to 101 for most of the weeks. Has had 2 days with no fever over 100. Did have diarrhea. No vomitting. With dizziness. Was able to stay drinking plenty of water. Works at [employer] and it is \"rampant there\". Patient was vaccinated in August with moderna. Today she has a little bit of cough. Not as thick as before. Notes she has been using inhaler, no longer has shortness of breath. Patient has been eating and drinking well. No vomiting. Prior to Admission medications    Medication Sig Start Date End Date Taking? Authorizing Provider   albuterol (ProAir HFA) 90 mcg/actuation inhaler Take 2 Puffs by inhalation every four (4) hours as needed for Wheezing. 12/28/21  Yes Britni Rowell, BRANDON   albuterol (PROVENTIL VENTOLIN) 2.5 mg /3 mL (0.083 %) nebu 3 mL by Nebulization route every four (4) hours as needed for Cough (wheezing, SOB). 12/23/21  Yes Lafrances Skiff, MD   fluticasone propion-salmeteroL (ADVAIR/WIXELA) 250-50 mcg/dose diskus inhaler Take 1 Puff by inhalation two (2) times a day. 12/23/21  Yes Lafrances Skiff, MD   montelukast (SINGULAIR) 10 mg tablet Take 1 Tablet by mouth daily. 12/23/21  Yes Lafrances Skiff, MD   dextroamphetamine-amphetamine (ADDERALL) 10 mg tablet Take 1 Tablet by mouth three (3) times daily as needed (ADHD). Max Daily Amount: 30 mg. 3/17/22  Yes Lafrances Skiff, MD   dextroamphetamine-amphetamine (ADDERALL) 10 mg tablet Take 1 Tablet by mouth three (3) times daily as needed (ADHD). Max Daily Amount: 30 mg. Indications: attention deficit disorder with hyperactivity 2/16/22  Yes Lafrances Skiff, MD   dextroamphetamine-amphetamine (ADDERALL) 10 mg tablet Take 1 Tablet by mouth three (3) times daily as needed (ADD). Max Daily Amount: 30 mg. Indications: attention deficit disorder with hyperactivity 1/17/22  Yes Lafrances Skiff, MD   losartan (COZAAR) 25 mg tablet Take 1 Tablet by mouth daily. 12/23/21  Yes Tobias Hernandez MD   escitalopram oxalate (LEXAPRO) 20 mg tablet Take 2 tablets by mouth once daily 3/16/21  Yes Tobias Hernandez MD   dextroamphetamine-amphetamine (ADDERALL) 10 mg tablet Take 10mg as directed. May take up to 3 tablets by mouth in 24hr period. 3/31/21  Yes Tobias Hernandez MD   dextroamphetamine-amphetamine (ADDERALL) 10 mg tablet Take 1 Tab by mouth three (3) times daily as needed (ADHD). Max Daily Amount: 30 mg. 1/31/21  Yes Tobias Hernandez MD   guaiFENesin (MUCINEX) 1,200 mg Ta12 ER tablet Take 1,200 mg by mouth two (2) times a day. Yes Deloris Bermeo   cetirizine (ZYRTEC) 10 mg tablet Take 1 Tab by mouth daily. 11/22/19  Yes Jen Marcus MD   predniSONE (DELTASONE) 10 mg tablet Taper daily. 60mg x1, 50mg x 1, 40mg x 1, 30mg x 1, 20mg x 1, 10mg x 1  Patient not taking: Reported on 8/3/2021 3/30/21   Tobias Hernandez MD   hydroCHLOROthiazide (HYDRODIURIL) 25 mg tablet Take 1 Tab by mouth daily. Patient not taking: Reported on 12/23/2021 12/2/20   Tobias Hernandez MD   butalbital-acetaminophen-caffeine (FIORICET, ESGIC) -40 mg per tablet Take 1 Tab by mouth every six (6) hours as needed for Headache or Migraine. Patient not taking: Reported on 8/3/2021 12/2/20   Tobias Hernandez MD   azelastine (OPTIVAR) 0.05 % ophthalmic solution Administer 1 Drop to both eyes two (2) times a day. Use in affected eye(s)  Patient not taking: Reported on 8/3/2021 12/2/20   Tobias Hernandez MD   evolocumab (Repatha SureClick) pen injection 1 mL by SubCUTAneous route every fourteen (14) days. Patient not taking: Reported on 12/2/2020 8/26/20   Tobias Hernandez MD   metFORMIN ER (GLUCOPHAGE XR) 500 mg tablet Take 2 Tabs by mouth daily (with dinner). Patient not taking: Reported on 12/23/2021 6/25/20   Tobias Hernandez MD   DM/acetaminophen/doxylamine (VICKS NYQUIL NIGHTTIME RELIEF PO) Take  by mouth.   Patient not taking: Reported on 1/4/2022 Provider, Historical   ezetimibe (ZETIA) 10 mg tablet Take 1 Tab by mouth daily. Patient not taking: Reported on 8/3/2021 11/22/19   Matteo Brooks MD   beclomethasone (QVAR) 80 mcg/actuation aero Take 1 Puff by inhalation two (2) times a day. Patient not taking: Reported on 12/23/2021 11/22/19   Matteo Brooks MD   azelastine (ASTELIN) 137 mcg (0.1 %) nasal spray 2 Sprays by Both Nostrils route two (2) times daily as needed for Rhinitis (allergies). Patient not taking: Reported on 8/3/2021 11/22/19   Matteo Brooks MD   prasugrel (EFFIENT) 10 mg tablet take 1 tablet by mouth once daily  Patient not taking: Reported on 8/3/2021 4/24/19   Provider, Historical   atorvastatin (LIPITOR) 40 mg tablet Take 1 Tab by mouth daily. Patient not taking: Reported on 12/2/2020 1/28/19   Zhanna HARDY NP     Allergies   Allergen Reactions    Codeine Other (comments)     Severe headaches    Erythromycin Rash    Morphine Hives     No longer an allergy per patient     Neomycin Rash     ROS as noted above in HPI. Objective:   Vital Signs: (As obtained by patient/caregiver at home)  There were no vitals taken for this visit.      PHYSICAL EXAMINATION:    Constitutional: [x] Appears well-developed and well-nourished [x] No apparent distress      Mental status: [x] Alert and awake  [x] Oriented to person/place/time [x] Able to follow commands      Eyes:   EOM    [x]  Normal      Sclera  [x]  Normal              Discharge [x]  None visible       HENT: [x] Normocephalic, atraumatic    [x] Mouth/Throat: Mucous membranes are moist      Pulmonary/Chest: [x] Respiratory effort normal   [x] No visualized signs of difficulty breathing or respiratory distress         Musculoskeletal:         [x] Normal range of motion of neck        Neurological:        [x] No Facial Asymmetry (Cranial nerve 7 motor function) (limited exam due to video visit)          [x] No gaze palsy              Skin:        [x] No significant exanthematous lesions or discoloration noted on facial skin              Psychiatric:       [x] Normal Affect        [x] Normal behavior      We discussed the expected course, resolution and complications of the diagnosis(es) in detail. Medication risks, benefits, costs, interactions, and alternatives were discussed as indicated. I advised her to contact the office if her condition worsens, changes or fails to improve as anticipated. She expressed understanding with the diagnosis(es) and plan. Floyd Bates is a 74 y.o. female who was evaluated by a video visit encounter for concerns as above. Patient identification was verified prior to start of the visit. A caregiver was present when appropriate. Due to this being a TeleHealth encounter (During [facility] public health emergency), evaluation of the following organ systems was limited: Vitals/Constitutional/EENT/Resp/CV/GI//MS/Neuro/Skin/Heme-Lymph-Imm. Pursuant to the emergency declaration under the [facility], 1135 waiver authority and the [employer] and [employer] Act, this Virtual  Visit was conducted, with patient's (and/or legal guardian's) consent, to reduce the patient's risk of exposure to COVID-19 and provide necessary medical care. Services were provided through a video synchronous discussion virtually to substitute for in-person clinic visit. Patient was located at their home. Provider was in office.      Melissa Mosley MD

## 2022-01-04 NOTE — LETTER
NOTIFICATION RETURN TO WORK / SCHOOL    1/4/2022 10:56 AM    [phone]    Fax to workplace: [phone]    To Whom It May Concern:    Riky Plummer is currently under the care of [facility]. She will return to work/school on 1/5/2022. Patient had symptoms consistent with a covid infection starting on 1/28/2021. She was unable to get tested (testing done per patient report was \"inconclusive\"). Today she reports that her symptoms are much improved, including fever free for > 24 hours. Per current CDC guidance, I have advised her it is okay to return to work as long as she is wearing a well fitting mask until 1/7/2022 (10 days after symptoms started). If there are questions or concerns please have the patient contact our office.         Sincerely,      Renate Fair MD

## 2022-01-04 NOTE — PATIENT INSTRUCTIONS
10 Things to Do When You Have COVID-19    Stay home. Don't go to school, work, or public areas. And don't use public transportation, ride-shares, or taxis unless you have no choice. Leave your home only if you need to get medical care. But call the doctor's office first so they know you're coming. And wear a mask. Ask before leaving isolation. Follow your doctor's advice about when it is safe for you to leave isolation. Wear a mask when you are around other people. It can help stop the spread of the virus. Limit contact with people in your home. If possible, stay in a separate bedroom and use a separate bathroom. Avoid contact with pets and other animals. If possible, have a friend or family member care for them while you're sick. Cover your mouth and nose with a tissue when you cough or sneeze. Then throw the tissue in the trash right away. Wash your hands often, especially after you cough or sneeze. Use soap and water, and scrub for at least 20 seconds. If soap and water aren't available, use an alcohol-based hand . Don't share personal household items. These include bedding, towels, cups and glasses, and eating utensils. Clean and disinfect your home every day. Use household  or disinfectant wipes or sprays. If needed, take acetaminophen (Tylenol) or ibuprofen (Advil, Motrin) to relieve fever and body aches. Read and follow all instructions on the label. Current as of: March 26, 2021               Content Version: 13.0  © 2006-2021 [employer]. Care instructions adapted under license by [employer] (which disclaims liability or warranty for this information). If you have questions about a medical condition or this instruction, always ask your healthcare professional. [address] any warranty or liability for your use of this information.

## 2022-01-07 NOTE — TELEPHONE ENCOUNTER
Attempted to call pharmacy. Received voice mail that pharmacy is closed today, unsure if due to weather or if they are just not open on fridays.  Will try again

## 2022-01-10 NOTE — TELEPHONE ENCOUNTER
[id number] is requesting a new script to be sent to the pharmacy for the lancet. They have stated that they have a script for the lancet strips and device.

## 2022-01-11 ENCOUNTER — TELEPHONE (OUTPATIENT)
Dept: INTERNAL MEDICINE CLINIC | Age: 67
End: 2022-01-11

## 2022-01-11 DIAGNOSIS — E11.9 DIET-CONTROLLED TYPE 2 DIABETES MELLITUS (HCC): Primary | ICD-10-CM

## 2022-01-11 RX ORDER — LANCETS
EACH MISCELLANEOUS
Qty: 100 EACH | Refills: 4 | Status: SHIPPED | OUTPATIENT
Start: 2022-01-11

## 2022-01-11 NOTE — TELEPHONE ENCOUNTER
Luz Marina Ojeda from [facility] left a voice message requesting a separate prescription for the Lancets. Please review.      [phone]    Last visit 01/04/2022 MD Vasu Shepard   Next appointment 3 months (03/2022)       Requested Prescriptions     Pending Prescriptions Disp Refills    lancets misc 100 Each 0     Sig: For use 1 time daily to measure blood sugar

## 2022-01-12 NOTE — TELEPHONE ENCOUNTER
Called pharmacy they received new rx sent by dr Braulio Sanchez yesterday.  Nothing else needed at this time

## 2022-02-26 DIAGNOSIS — F41.9 ANXIETY AND DEPRESSION: ICD-10-CM

## 2022-02-26 DIAGNOSIS — F32.A ANXIETY AND DEPRESSION: ICD-10-CM

## 2022-02-28 ENCOUNTER — TELEPHONE (OUTPATIENT)
Dept: INTERNAL MEDICINE CLINIC | Age: 67
End: 2022-02-28

## 2022-02-28 DIAGNOSIS — F41.9 ANXIETY AND DEPRESSION: ICD-10-CM

## 2022-02-28 DIAGNOSIS — F32.A ANXIETY AND DEPRESSION: ICD-10-CM

## 2022-02-28 RX ORDER — ESCITALOPRAM OXALATE 20 MG/1
TABLET ORAL
Qty: 60 TABLET | Refills: 5 | Status: SHIPPED | OUTPATIENT
Start: 2022-02-28 | End: 2022-03-01 | Stop reason: SDUPTHER

## 2022-02-28 NOTE — TELEPHONE ENCOUNTER
Received letter from [employer] regarding pt cologuard order: [id number]. Letter states that pt has not completed test yet. States they have tried to contact pt in regards to sample with no success. Requesting that our office reach out to her. LVM requesting call back if she has any questions about cologuard screening, advised about the letter received. [employer] message sent as well.
no

## 2022-02-28 NOTE — TELEPHONE ENCOUNTER
Last visit 01/04/2022 Virtual visit MD Garnica Hence   Next appointment 3 months (03/2022)   Previous refill encounter(s)   03/16/2021 Lexapro #60 with 5 refills. Requested Prescriptions     Pending Prescriptions Disp Refills    escitalopram oxalate (LEXAPRO) 20 mg tablet 60 Tablet 0     Sig: Take 2 Tablets by mouth daily.

## 2022-03-01 RX ORDER — ESCITALOPRAM OXALATE 20 MG/1
40 TABLET ORAL DAILY
Qty: 60 TABLET | Refills: 0 | Status: SHIPPED | OUTPATIENT
Start: 2022-03-01

## 2022-03-19 PROBLEM — R43.2 LOSS OF TASTE: Status: ACTIVE | Noted: 2021-03-16

## 2022-03-19 PROBLEM — E66.01 SEVERE OBESITY (HCC): Status: ACTIVE | Noted: 2020-01-28

## 2022-08-22 NOTE — MR AVS SNAPSHOT
[address] [facility] [place] [address] 
[phone] Patient: Claudine Webb MRN: [id number] UAR:[id number] Visit Information Date & Time Provider Department Dept. Phone Encounter #  
 4/6/2018 10:00 AM Flako Christian [phone] Pediatrics and Internal [facility] [phone] [id number] Follow-up Instructions Return in about 3 months (around 7/6/2018) for htn, asthma, diabetes. Upcoming Health Maintenance Date Due Hepatitis C Screening 1955 Pneumococcal 19-64 Medium Risk (1 of 1 - PPSV23) 11/20/1972 DTaP/Tdap/Td series (1 - Tdap) 11/20/1974 ZOSTER VACCINE AGE 60> 9/20/2013 EYE EXAM RETINAL OR DILATED Q1 3/19/2016 FOBT Q 1 YEAR AGE 50-75 3/19/2016 LIPID PANEL Q1 3/1/2017 FOOT EXAM Q1 3/2/2017 Influenza Age 5 to Adult 8/1/2017 HEMOGLOBIN A1C Q6M 1/10/2018 MICROALBUMIN Q1 2/8/2018 BREAST CANCER SCRN MAMMOGRAM 2/3/2019 PAP AKA CERVICAL CYTOLOGY 2/3/2020 Allergies as of 4/6/2018  Review Complete On: 4/6/2018 By: Shayne Sanchez NP Severity Noted Reaction Type Reactions Codeine  12/28/2010    Other (comments) Severe headaches Erythromycin  02/03/2017    Rash Morphine  12/28/2010    Hives Neomycin  02/03/2017    Rash Current Immunizations  Never Reviewed No immunizations on file. Not reviewed this visit You Were Diagnosed With   
  
 Codes Comments HTN (hypertension), benign    -  Primary ICD-10-CM: I10 
ICD-9-CM: 401.1 Attention deficit disorder (ADD) without hyperactivity     ICD-10-CM: F98.8 ICD-9-CM: 314.00 Diet-controlled type 2 diabetes mellitus [facility]     ICD-10-CM: E11.9 ICD-9-CM: 250.00 Depression with anxiety     ICD-10-CM: F41.8 ICD-9-CM: 300.4 Moderate persistent asthma without complication     PHW-72-WQ: J45.40 ICD-9-CM: 493.90 Anxiety and depression     ICD-10-CM: F41.9, F32.9 ICD-9-CM: 300.00, 311 Vitals BP Pulse Temp Resp Height(growth percentile) Weight(growth percentile) 160/73 (BP 1 Location: Left arm, BP Patient Position: Sitting) 100 99.1 °F (37.3 °C) (Oral) 18 5' 2.01\" (1.575 m) 173 lb (78.5 kg) SpO2 BMI OB Status Smoking Status 96% 31.63 kg/m2 Postmenopausal Former Smoker Vitals History BMI and BSA Data Body Mass Index Body Surface Area  
 31.63 kg/m 2 1.85 m 2 Preferred Pharmacy Pharmacy Name Phone [address], [address] [facility] [phone] Your Updated Medication List  
  
   
This list is accurate as of 4/6/18 10:47 AM.  Always use your most recent med list.  
  
  
  
  
 albuterol 90 mcg/actuation inhaler Commonly known as:  PROVENTIL HFA, VENTOLIN HFA, PROAIR HFA Take 2 Puffs by inhalation every four (4) hours as needed for Wheezing. dextroamphetamine-amphetamine 10 mg tablet Commonly known as:  ADDERALL Take 3 Tabs (30 mg total) by mouth daily. * escitalopram oxalate 20 mg tablet Commonly known as:  Kannan Beers Take 1 Tab by mouth daily. Indications: Generalized Anxiety Disorder * escitalopram oxalate 20 mg tablet Commonly known as:  Kannan Beers TAKE TWO TABLETS BY MOUTH DAILY  
  
 fluticasone-salmeterol 250-50 mcg/dose diskus inhaler Commonly known as:  ADVAIR Take 1 Puff by inhalation two (2) times a day.  
  
 gabapentin 400 mg capsule Commonly known as:  NEURONTIN Take 400 mg by mouth two (2) times a day. lisinopril-hydroCHLOROthiazide 20-25 mg per tablet Commonly known as:  Carmel Done Take 1 Tab by mouth daily. metFORMIN  mg tablet Commonly known as:  GLUCOPHAGE XR Take 1 Tab by mouth daily (with dinner). Indications: type 2 diabetes mellitus  
  
 mometasone 50 mcg/actuation nasal spray Commonly known as:  NASONEX  
2 Sprays by Both Nostrils route daily. montelukast 10 mg tablet Commonly known as:  SINGULAIR Take 1 Tab by mouth daily. pregabalin 200 mg capsule Commonly known as:  [facility] Take 1 Cap by mouth three (3) times daily. Max Daily Amount: 600 mg.  
  
 * Notice: This list has 2 medication(s) that are the same as other medications prescribed for you. Read the directions carefully, and ask your doctor or other care provider to review them with you. Prescriptions Printed Refills  
 dextroamphetamine-amphetamine (ADDERALL) 10 mg tablet 0 Sig: Take 3 Tabs (30 mg total) by mouth daily. Class: Print Route: Oral  
  
Prescriptions Sent to Pharmacy Refills  
 lisinopril-hydroCHLOROthiazide (PRINZIDE, ZESTORETIC) 20-25 mg per tablet 3 Sig: Take 1 Tab by mouth daily. Class: Normal  
 Pharmacy: [address] Ph #: [phone] Route: Oral  
 albuterol (PROVENTIL HFA, VENTOLIN HFA, PROAIR HFA) 90 mcg/actuation inhaler 0 Sig: Take 2 Puffs by inhalation every four (4) hours as needed for Wheezing. Class: Normal  
 Pharmacy: [address] Ph #: [phone] Route: Inhalation  
 fluticasone-salmeterol (ADVAIR) 250-50 mcg/dose diskus inhaler 0 Sig: Take 1 Puff by inhalation two (2) times a day. Class: Normal  
 Pharmacy: [address] Ph #: [phone] Route: Inhalation Follow-up Instructions Return in about 3 months (around 7/6/2018) for htn, asthma, diabetes. Patient Instructions Learning About High Blood Pressure What is high blood pressure? Blood pressure is a measure of how hard the blood pushes against the walls of your arteries. It's normal for blood pressure to go up and down throughout the day, but if it stays up, you have high blood pressure. Another name for high blood pressure is hypertension. Two numbers tell you your blood pressure. The first number is the systolic pressure.  It shows how hard the blood pushes when your heart is pumping. The second number is the diastolic pressure. It shows how hard the blood pushes between heartbeats, when your heart is relaxed and filling with blood. A blood pressure of less than 120/80 (say \"120 over 80\") is ideal for an adult. High blood pressure is 140/90 or higher. You have high blood pressure if your top number is 140 or higher or your bottom number is 90 or higher, or both. Many people fall into the category in between, called prehypertension. People with prehypertension need to make lifestyle changes to bring their blood pressure down and help prevent or delay high blood pressure. What happens when you have high blood pressure? · Blood flows through your arteries with too much force. Over time, this damages the walls of your arteries. But you can't feel it. High blood pressure usually doesn't cause symptoms. · Fat and calcium start to build up in your arteries. This buildup is called plaque. Plaque makes your arteries narrower and stiffer. Blood can't flow through them as easily. · This lack of good blood flow starts to damage some of the organs in your body. This can lead to problems such as coronary artery disease and heart attack, heart failure, stroke, kidney failure, and eye damage. How can you prevent high blood pressure? · Stay at a healthy weight. · Try to limit how much sodium you eat to less than 2,300 milligrams (mg) a day. If you limit your sodium to 1,500 mg a day, you can lower your blood pressure even more. ¨ Buy foods that are labeled \"unsalted,\" \"sodium-free,\" or \"low-sodium. \" Foods labeled \"reduced-sodium\" and \"light sodium\" may still have too much sodium. ¨ Flavor your food with garlic, lemon juice, onion, vinegar, herbs, and spices instead of salt. Do not use soy sauce, steak sauce, onion salt, garlic salt, mustard, or ketchup on your food. ¨ Use less salt (or none) when recipes call for it.  You can often use half the salt a recipe calls for without losing flavor. · Be physically active. Get at least 30 minutes of exercise on most days of the week. Walking is a good choice. You also may want to do other activities, such as running, swimming, cycling, or playing tennis or team sports. · Limit alcohol to 2 drinks a day for men and 1 drink a day for women. · Eat plenty of fruits, vegetables, and low-fat dairy products. Eat less saturated and total fats. How is high blood pressure treated? · Your doctor will suggest making lifestyle changes. For example, your doctor may ask you to eat healthy foods, quit smoking, lose extra weight, and be more active. · If lifestyle changes don't help enough or your blood pressure is very high, you will have to take medicine every day. Follow-up care is a key part of your treatment and safety. Be sure to make and go to all appointments, and call your doctor if you are having problems. It's also a good idea to know your test results and keep a list of the medicines you take. Where can you learn more? Go to http://ac-vianey.info/. Enter P501 in the search box to learn more about \"Learning About High Blood Pressure. \" Current as of: September 21, 2016 Content Version: 11.4 © 2588-2809 Healthwise, Incorporated. Care instructions adapted under license by "[employer] (which disclaims liability or warranty for this information). If you have questions about a medical condition or this instruction, always ask your healthcare professional. [place] [address] any warranty or liability for your use of this information. Asthma in Adults: Care Instructions Your Care Instructions During an asthma attack, your airways swell and narrow as a reaction to certain things (triggers). This makes it hard to breathe. You may be able to prevent asthma attacks if you avoid the things that set off your asthma symptoms.  Keeping your asthma under control and treating symptoms before they get bad can help you avoid severe attacks. If you can control your asthma, you may be able to do all of your normal daily activities. You may also avoid asthma attacks and trips to the hospital. 
Follow-up care is a key part of your treatment and safety. Be sure to make and go to all appointments, and call your doctor if you are having problems. It's also a good idea to know your test results and keep a list of the medicines you take. How can you care for yourself at home? · Follow your asthma action plan so you can manage your symptoms at home. An asthma action plan will help you prevent and control airway reactions and will tell you what to do during an asthma attack. If you do not have an asthma action plan, work with your doctor to build one. · Take your asthma medicine exactly as prescribed. Medicine plays an important role in controlling asthma. Talk to your doctor right away if you have any questions about what to take and how to take it. ¨ Use your quick-relief medicine when you have symptoms of an attack. Quick-relief medicine often is an albuterol inhaler. Some people need to use quick-relief medicine before they exercise. ¨ Take your controller medicine every day, not just when you have symptoms. Controller medicine is usually an inhaled corticosteroid. The goal is to prevent problems before they occur. Do not use your controller medicine to try to treat an attack that has already started. It does not work fast enough to help. ¨ If your doctor prescribed corticosteroid pills to use during an attack, take them as directed. They may take hours to work, but they may shorten the attack and help you breathe better. ¨ Keep your quick-relief medicine with you at all times. · Talk to your doctor before using other medicines. Some medicines, such as aspirin, can cause asthma attacks in some people. · Check yourself for asthma symptoms to know which step to follow in your action plan. Watch for things like being short of breath, having chest tightness, coughing, and wheezing. Also notice if symptoms wake you up at night or if you get tired quickly when you exercise. · If you have a peak flow meter, use it to check how well you are breathing. This can help you predict when an asthma attack is going to occur. Then you can take medicine to prevent the asthma attack or make it less severe. · See your doctor regularly. These visits will help you learn more about asthma and what you can do to control it. Your doctor will monitor your treatment to make sure the medicine is helping you. · Keep track of your asthma attacks and your treatment. After you have had an attack, write down what triggered it, what helped end it, and any concerns you have about your asthma action plan. Take your diary when you see your doctor. You can then review your asthma action plan and decide if it is working. · Do not smoke or allow others to smoke around you. Avoid smoky places. Smoking makes asthma worse. If you need help quitting, talk to your doctor about stop-smoking programs and medicines. These can increase your chances of quitting for good. · Learn what triggers an asthma attack for you, and avoid the triggers when you can. Common triggers include colds, smoke, air pollution, dust, pollen, mold, pets, cockroaches, stress, and cold air. · Avoid colds and the flu. Get a pneumococcal vaccine shot. If you have had one before, ask your doctor whether you need a second dose. Get a flu vaccine every fall. If you must be around people with colds or the flu, wash your hands often. When should you call for help? Call 911 anytime you think you may need emergency care. For example, call if: 
? · You have severe trouble breathing. ?Call your doctor now or seek immediate medical care if: 
? · Your symptoms do not get better after you have followed your asthma action plan.   
? · You cough up yellow, dark brown, or bloody mucus (sputum). ? Watch closely for changes in your health, and be sure to contact your doctor if: 
? · Your coughing and wheezing get worse. ? · You need to use quick-relief medicine on more than 2 days a week (unless it is just for exercise). ? · You need help figuring out what is triggering your asthma attacks. Where can you learn more? Go to http://ac-vianey.info/. Enter P597 in the search box to learn more about \"Asthma in Adults: Care Instructions. \" Current as of: May 12, 2017 Content Version: 11.4 © 7090-6876 [employer]. Care instructions adapted under license by [employer] (which disclaims liability or warranty for this information). If you have questions about a medical condition or this instruction, always ask your healthcare professional. [address] any warranty or liability for your use of this information. Introducing [facility] & HE[facility]! [facility] introduces [employer] patient portal. Now you can access parts of your medical record, email your doctor's office, and request medication refills online. 1. In your internet browser, go to [email or web address] 2. Click on the First Time User? Click Here link in the Sign In box. You will see the New Member Sign Up page. 3. Enter your [employer] Access Code exactly as it appears below. You will not need to use this code after youve completed the sign-up process. If you do not sign up before the expiration date, you must request a new code. · [employer] Access Code: [email or web address] Expires: 7/5/2018 10:47 AM 
 
4. Enter the last four digits of your Social Security Number (xxxx) and Date of Birth (mm/dd/yyyy) as indicated and click Submit. You will be taken to the next sign-up page. 5. Create a [employer] ID. This will be your [employer] login ID and cannot be changed, so think of one that is secure and easy to remember. 6. Create a [employer] password.  You can change your password at any time. 7. Enter your Password Reset Question and Answer. This can be used at a later time if you forget your password. 8. Enter your e-mail address. You will receive e-mail notification when new information is available in [address] 9. Click Sign Up. You can now view and download portions of your medical record. 10. Click the Download Summary menu link to download a portable copy of your medical information. If you have questions, please visit the Frequently Asked Questions section of the [employer] website. Remember, [employer] is NOT to be used for urgent needs. For medical emergencies, dial 911. Now available from your iPhone and Android! Please provide this summary of care documentation to your next provider. Your primary care clinician is listed as Ginna Galloway. If you have any questions after today's visit, please call [phone]. HEMATURIA

## 2023-05-14 RX ORDER — CETIRIZINE HYDROCHLORIDE 10 MG/1
10 TABLET ORAL DAILY
COMMUNITY
Start: 2019-11-22

## 2023-05-14 RX ORDER — ESCITALOPRAM OXALATE 20 MG/1
40 TABLET ORAL DAILY
COMMUNITY
Start: 2022-03-01

## 2023-05-14 RX ORDER — METFORMIN HYDROCHLORIDE 500 MG/1
1000 TABLET, EXTENDED RELEASE ORAL
COMMUNITY
Start: 2020-06-25

## 2023-05-14 RX ORDER — MONTELUKAST SODIUM 10 MG/1
10 TABLET ORAL DAILY
COMMUNITY
Start: 2021-12-23

## 2023-05-14 RX ORDER — ALBUTEROL SULFATE 90 UG/1
2 AEROSOL, METERED RESPIRATORY (INHALATION) EVERY 4 HOURS PRN
COMMUNITY
Start: 2021-12-28

## 2023-05-14 RX ORDER — PRASUGREL 10 MG/1
1 TABLET, FILM COATED ORAL DAILY
COMMUNITY
Start: 2019-04-24

## 2023-05-14 RX ORDER — ALBUTEROL SULFATE 2.5 MG/3ML
2.5 SOLUTION RESPIRATORY (INHALATION) EVERY 4 HOURS PRN
COMMUNITY
Start: 2021-12-23

## 2023-05-14 RX ORDER — EZETIMIBE 10 MG/1
10 TABLET ORAL DAILY
COMMUNITY
Start: 2019-11-22

## 2023-05-14 RX ORDER — DEXTROAMPHETAMINE SACCHARATE, AMPHETAMINE ASPARTATE, DEXTROAMPHETAMINE SULFATE AND AMPHETAMINE SULFATE 2.5; 2.5; 2.5; 2.5 MG/1; MG/1; MG/1; MG/1
10 TABLET ORAL 3 TIMES DAILY PRN
COMMUNITY
Start: 2021-01-31

## 2023-05-14 RX ORDER — GUAIFENESIN 1200 MG/1
1200 TABLET, EXTENDED RELEASE ORAL 2 TIMES DAILY
COMMUNITY

## 2023-05-14 RX ORDER — LOSARTAN POTASSIUM 25 MG/1
25 TABLET ORAL DAILY
COMMUNITY
Start: 2021-12-23

## 2023-05-14 RX ORDER — BUTALBITAL, ACETAMINOPHEN AND CAFFEINE 50; 325; 40 MG/1; MG/1; MG/1
1 TABLET ORAL EVERY 6 HOURS PRN
COMMUNITY
Start: 2020-12-02

## 2023-05-14 RX ORDER — ATORVASTATIN CALCIUM 40 MG/1
40 TABLET, FILM COATED ORAL DAILY
COMMUNITY
Start: 2019-01-28

## 2023-05-14 RX ORDER — LANCETS 30 GAUGE
EACH MISCELLANEOUS
COMMUNITY
Start: 2022-01-11

## 2023-05-14 RX ORDER — EVOLOCUMAB 140 MG/ML
140 INJECTION, SOLUTION SUBCUTANEOUS
COMMUNITY
Start: 2020-08-26

## 2023-09-06 NOTE — TELEPHONE ENCOUNTER
CREATED AND FAXED PUMP REFILL ORDER TO [facility] ([facility]). ORDER ID: [id number]     EXPECTED DELIVERY DATE:  09/11/2023     REFILL DATE: 09/13/2023     HOME CONNECT: NO    MEDICATION ORDERED/CONCENTRATION/UNIT    PF Morphine Sulfate 1 mg/mL  PF Bupivacaine HCl 1 mg/mL    TOTAL VOLUME 20 (twenty) mL     [facility] ORDER ATTACHED TO THIS ENCOUNTER. Pt pharmacy called in to request a change to the lancet device rx to just 100 count of the lancets. Please send over new script for just lancets.